# Patient Record
Sex: MALE | Race: WHITE | NOT HISPANIC OR LATINO | Employment: FULL TIME | URBAN - METROPOLITAN AREA
[De-identification: names, ages, dates, MRNs, and addresses within clinical notes are randomized per-mention and may not be internally consistent; named-entity substitution may affect disease eponyms.]

---

## 2017-01-03 ENCOUNTER — OFFICE VISIT (OUTPATIENT)
Dept: MEDICAL GROUP | Facility: MEDICAL CENTER | Age: 37
End: 2017-01-03
Attending: FAMILY MEDICINE
Payer: MEDICAID

## 2017-01-03 VITALS
DIASTOLIC BLOOD PRESSURE: 84 MMHG | OXYGEN SATURATION: 97 % | WEIGHT: 315 LBS | SYSTOLIC BLOOD PRESSURE: 136 MMHG | HEIGHT: 74 IN | RESPIRATION RATE: 20 BRPM | BODY MASS INDEX: 40.43 KG/M2 | HEART RATE: 92 BPM | TEMPERATURE: 98.3 F

## 2017-01-03 DIAGNOSIS — M54.32 LEFT SIDED SCIATICA: ICD-10-CM

## 2017-01-03 DIAGNOSIS — L03.116 CELLULITIS OF LEFT LOWER EXTREMITY: ICD-10-CM

## 2017-01-03 DIAGNOSIS — E66.01 MORBID OBESITY DUE TO EXCESS CALORIES (HCC): ICD-10-CM

## 2017-01-03 PROBLEM — L03.119 CELLULITIS OF EXTREMITY: Status: ACTIVE | Noted: 2017-01-03

## 2017-01-03 PROCEDURE — 99214 OFFICE O/P EST MOD 30 MIN: CPT | Performed by: FAMILY MEDICINE

## 2017-01-03 PROCEDURE — 99213 OFFICE O/P EST LOW 20 MIN: CPT | Performed by: FAMILY MEDICINE

## 2017-01-03 RX ORDER — OXYCODONE AND ACETAMINOPHEN 10; 325 MG/1; MG/1
1 TABLET ORAL EVERY 6 HOURS PRN
Qty: 30 TAB | Refills: 0 | Status: SHIPPED | OUTPATIENT
Start: 2017-01-03 | End: 2017-01-19 | Stop reason: SDUPTHER

## 2017-01-03 RX ORDER — SULFAMETHOXAZOLE AND TRIMETHOPRIM 800; 160 MG/1; MG/1
1 TABLET ORAL 2 TIMES DAILY
Qty: 20 TAB | Refills: 0 | Status: SHIPPED | OUTPATIENT
Start: 2017-01-03 | End: 2017-01-19

## 2017-01-03 NOTE — ASSESSMENT & PLAN NOTE
Patient reports recent exacerbation in his left-sided sciatica when he was lifting a 40 pound water jug on 12/23. He reports persistent low back pain which has kept him bedbound for part of the past 10 days. He denies urinary or fecal incontinence. Just returned to work today worked a half day with significant rising level of low back pain as the morning progressed. He was seen at [facility] emergency room about 10 days ago and given a prescription for Valium he believes 5 mg tablets, took that last dose yesterday and is now out. Reports he is not on narcotics having finished his 30 Prescription of Percocet several days ago as well. Reports he has never had any back imaging. Reports a 2 month episode of right-sided sciatica somewhat similar in intensity in October/November 2015 which resolved on its own.

## 2017-01-03 NOTE — PROGRESS NOTES
Chief Complaint   Patient presents with   • Back Pain       HISTORY OF PRESENT ILLNESS: Patient is a 36 y.o. male established patient who presents today to follow-up on ongoing left-sided sciatica, developing cellulitis of the proximal right thigh, worsening morbid obesity        Left sided sciatica  Patient reports recent exacerbation in his left-sided sciatica when he was lifting a 40 pound water jug on 12/23. He reports persistent low back pain which has kept him bedbound for part of the past 10 days. He denies urinary or fecal incontinence. Just returned to work today worked a half day with significant rising level of low back pain as the morning progressed. He was seen at [facility] emergency room about 10 days ago and given a prescription for Valium he believes 5 mg tablets, took that last dose yesterday and is now out. Reports he is not on narcotics having finished his 30 Prescription of Percocet several days ago as well. Reports he has never had any back imaging. Reports a 2 month episode of right-sided sciatica somewhat similar in intensity in October/November 2015 which resolved on its own.    Cellulitis of extremity  Patient reports onset 3-4 days ago of swelling and tenderness in the proximal medial right thigh. He reports queasiness last siting getting just some thin watery drainage, there is been no rupture or drainage of purulent material. Patient has not noticed any red streaks or fever. Does have a history of previous boils in this area.    Morbid obesity  Current weight has risen 15 pounds in the past 13 days. Patient reports that due to back pain and not attending work he decided around the house and consumed comfort food. He reports he has restarted his previous calorie restricted intake today and is shooting for 1000 1500 donta per day. He reports no unexplained hair loss or cold intolerance      Patient Active Problem List    Diagnosis Date Noted   • Left sided sciatica 01/03/2017   • Cellulitis  of extremity 01/03/2017   • Otalgia of both ears 08/30/2016   • Sleep apnea in adult 06/06/2016   • Anxiety 01/26/2016   • Morbid obesity (HCC) 09/22/2015   • ALCOHOL ABUSE, EPISODIC 08/27/2015   • Tobacco abuse counseling 08/27/2015   • GERD (gastroesophageal reflux disease) 04/06/2015   • Hypertriglyceridemia 01/05/2015   • Diabetes mellitus type 2 in obese (HCC) 12/22/2014   • HTN (hypertension) 12/10/2014   • Chest pain 12/10/2014       Allergies:Review of patient's allergies indicates no known allergies.    Current Outpatient Prescriptions   Medication Sig Dispense Refill   • sulfamethoxazole-trimethoprim (BACTRIM DS) 800-160 MG tablet Take 1 Tab by mouth 2 times a day. 20 Tab 0   • oxycodone-acetaminophen (PERCOCET-10)  MG Tab Take 1 Tab by mouth every 6 hours as needed for Severe Pain for up to 28 days. 30 Tab 0   • ranitidine (ZANTAC) 300 MG tablet TAKE 1 TAB BY MOUTH EVERY DAY. 30 Tab 6   • cyclobenzaprine (FLEXERIL) 10 MG Tab Take 1 Tab by mouth 3 times a day as needed. 90 Tab 1   • predniSONE (DELTASONE) 10 MG Tab 4 by mouth daily ×3 days, 3 by mouth daily ×3 days, 2 by mouth daily ×3 days, 1 by mouth daily ×3 days and stop 30 Tab 0   • MethylPREDNISolone (MEDROL DOSEPAK) 4 MG Tablet Therapy Pack Use as directed 1 Kit 0   • metformin (GLUCOPHAGE) 500 MG Tab TAKE 2 TABS BY MOUTH 2 TIMES A DAY, WITH MEALS. 120 Tab 3   • buPROPion SR (WELLBUTRIN-SR) 100 MG TABLET SR 12 HR Take 1 Tab by mouth 2 times a day. 60 Tab 2   • lisinopril (PRINIVIL) 10 MG Tab Take 1 Tab by mouth every day. 30 Tab 11   • TRUETEST TEST strip USE TO TEST 2 TIMES A  Strip 6   • metformin (GLUCOPHAGE) 500 MG Tab Take 2 Tabs by mouth 2 times a day, with meals. 120 Tab 6   • metoprolol SR (TOPROL XL) 50 MG TABLET SR 24 HR Take 1 Tab by mouth every day. 30 Tab 11   • sertraline (ZOLOFT) 100 MG Tab Take 1 Tab by mouth every day. 30 Tab 11   • Blood Glucose Monitoring Suppl SUPPLIES Misc Test strips order: Test strips for True  "Test meter. Sig: use 2x and prn  Diagnosis: Type 2 diabetes,, obese, uncontrolled 100 Each 3   • aspirin EC (ECOTRIN) 81 MG TBEC Take 81 mg by mouth every day.       No current facility-administered medications for this visit.     Social history-, working  Social History   Substance Use Topics   • Smoking status: Current Every Day Smoker -- 1.50 packs/day for 21 years     Types: Cigarettes   • Smokeless tobacco: Never Used   • Alcohol Use: 0.0 oz/week     0 Standard drinks or equivalent per week      Comment: 3 to 4 times weekly       Family History   Problem Relation Age of Onset   • Diabetes Mother    • Stroke Father    • Alcohol/Drug Maternal Grandfather    • Diabetes Paternal Grandfather    • Cancer Paternal Grandfather      lung   • Heart Disease Neg Hx        ROS:  Review of Systems   Constitutional: Negative for fever, chills, weight loss and malaise/fatigue.   Eyes: Negative for blurred vision.   Respiratory: Negative for cough, sputum production, shortness of breath and wheezing.    Cardiovascular: Negative for chest pain, palpitations, orthopnea and leg swelling.   Gastrointestinal: Negative for heartburn, nausea, vomiting and abdominal pain.   Endo/Heme/Allergies: Does not bruise/bleed easily.               Exam:  Blood pressure 136/84, pulse 92, temperature 36.8 °C (98.3 °F), resp. rate 20, height 1.88 m (6' 2.02\"), weight 166.017 kg (366 lb), SpO2 97 %.  General:  Well nourished, well developed male in NAD  Head is grossly normal.  Neck: Supple without JVD or bruit. Thyroid is not enlarged. Trachea is midline.  Pulmonary: Clear to ausculation .  Normal effort. No rales, ronchi, or wheezing.  Cardiovascular: Regular rate and rhythm without murmur.  Abdomen-Abdomen is soft, normal bowel sounds, no masses, guarding, ororganomegaly, or tenderness.  Lower extremities- neg for pretibial edema. 3 cm of mild swelling noted over the very proximal medial portion of the right thigh with no clear-cut " induration or fluctuance. Mildly tender to palpation      Please note that this dictation was created using voice recognition software. I have made every reasonable attempt to correct obvious errors, but I expect that there are errors of grammar and possibly content that I did not discover before finalizing the note.    Assessment/Plan:  1. Left sided sciatica  DX-LUMBAR SPINE-4+ VIEWS    DX-HIP-COMPLETE - UNILATERAL 2+ LEFT    DX-LUMBAR SPINE-4+ VIEWS   2. Cellulitis of left lower extremity     3. Morbid obesity due to excess calories (HCC)        Plan: 1. Warm compresses 3-4 times daily to the proximal right thigh  2. Rx Bactrim DS 1 by mouth twice a day ×20 days  3. X-rays of the lumbar spine and both hips  4. Rx Percocet 10/325, #20-patient has been treated with prednisone and recently Valium, and prior to that Flexeril for this round of sciatica pain  5. Revisit in 2 weeks  6. Patient is encouraged to limit calorie intake to approximately 1500 donta per day to restart weight loss

## 2017-01-03 NOTE — ASSESSMENT & PLAN NOTE
Current weight has risen 15 pounds in the past 13 days. Patient reports that due to back pain and not attending work he decided around the house and consumed comfort food. He reports he has restarted his previous calorie restricted intake today and is shooting for 1000 1500 donta per day. He reports no unexplained hair loss or cold intolerance

## 2017-01-03 NOTE — MR AVS SNAPSHOT
"Luis Alfredo Felixaubrie Tegan   1/3/2017 1:10 PM   Office Visit   MRN: [id number]    [facility]   Dept Phone:  [phone]    Description:  Male : 1980   Provider:  Nicho Tovar M.D.           Reason for Visit     Back Pain           Allergies as of 1/3/2017     No Known Allergies      You were diagnosed with     Left sided sciatica   [158073]       Cellulitis of left lower extremity   [966578]       Morbid obesity due to excess calories (HCC)   [7814986]         Vital Signs     Blood Pressure Pulse Temperature Respirations Height Weight    136/84 mmHg 92 36.8 °C (98.3 °F) 20 1.88 m (6' 2.02\") 166.017 kg (366 lb)    Body Mass Index Oxygen Saturation Smoking Status             46.97 kg/m2 97% Current Every Day Smoker         Basic Information     Date Of Birth Sex Race Ethnicity Preferred Language    1980 Male White Non- English      Your appointments     2017  4:30 PM   Established Patient with Nicho Tovar M.D.   [facility] ([facility])    [address] [place] [id number]   [phone]           You will be receiving a confirmation call a few days before your appointment from our automated call confirmation system.              Problem List              ICD-10-CM Priority Class Noted - Resolved    HTN (hypertension) I10   12/10/2014 - Present    Chest pain R07.9   12/10/2014 - Present    Diabetes mellitus type 2 in obese (HCC) E11.9, E66.9   2014 - Present    Hypertriglyceridemia E78.1   2015 - Present    GERD (gastroesophageal reflux disease) K21.9   2015 - Present    ALCOHOL ABUSE, EPISODIC    2015 - Present    Tobacco abuse counseling Z71.6   2015 - Present    Morbid obesity (HCC) E66.01   2015 - Present    Anxiety F41.9   2016 - Present    Sleep apnea in adult G47.33   2016 - Present    Otalgia of both ears H92.03   2016 - Present      Health Maintenance        Date Due Completion Dates    IMM HEP " B VACCINE (1 of 3 - Primary Series) 1980 ---    IMM DTaP/Tdap/Td Vaccine (1 - Tdap) 5/6/1997 ---    IMM PNEUMOCOCCAL 19-64 (ADULT) MEDIUM RISK SERIES (1 of 1 - PPSV23) 5/6/1997 ---    IMM INFLUENZA (1) 9/1/2016 ---    DIABETES MONOFILAMTENT / LE EXAM 12/10/2016 12/10/2015 (Done)    Override on 12/10/2015: Done    SERUM CREATININE 1/5/2017 1/5/2016, 8/22/2015, 3/2/2015, 12/10/2014, 5/26/2009    A1C SCREENING 2/16/2017 8/16/2016, 1/5/2016, 8/22/2015, 5/6/2015, 12/31/2014    URINE ACR / MICROALBUMIN 3/9/2017 3/9/2016, 5/6/2015, 12/31/2014    FASTING LIPID PROFILE 8/16/2017 8/16/2016, 5/6/2015, 12/31/2014    RETINAL SCREENING 10/31/2017 10/31/2016            Current Immunizations     No immunizations on file.      Below and/or attached are the medications your provider expects you to take. Review all of your home medications and newly ordered medications with your provider and/or pharmacist. Follow medication instructions as directed by your provider and/or pharmacist. Please keep your medication list with you and share with your provider. Update the information when medications are discontinued, doses are changed, or new medications (including over-the-counter products) are added; and carry medication information at all times in the event of emergency situations     Allergies:  No Known Allergies          Medications  Valid as of: January 03, 2017 -  1:34 PM    Generic Name Brand Name Tablet Size Instructions for use    Aspirin (Tablet Delayed Response) ECOTRIN 81 MG Take 81 mg by mouth every day.        Blood Glucose Monitoring Suppl (Misc) Blood Glucose Monitoring Suppl SUPPLIES Test strips order: Test strips for True Test meter. Sig: use 2x and prn  Diagnosis: Type 2 diabetes,, obese, uncontrolled        BuPROPion HCl (TABLET SR 12 HR) WELLBUTRIN- MG Take 1 Tab by mouth 2 times a day.        Cyclobenzaprine HCl (Tab) FLEXERIL 10 MG Take 1 Tab by mouth 3 times a day as needed.        Glucose Blood (Strip)  TRUETEST TEST  USE TO TEST 2 TIMES A DAY        Lisinopril (Tab) PRINIVIL 10 MG Take 1 Tab by mouth every day.        MetFORMIN HCl (Tab) GLUCOPHAGE 500 MG Take 2 Tabs by mouth 2 times a day, with meals.        MetFORMIN HCl (Tab) GLUCOPHAGE 500 MG TAKE 2 TABS BY MOUTH 2 TIMES A DAY, WITH MEALS.        MethylPREDNISolone (Tablet Therapy Pack) MEDROL DOSEPAK 4 MG Use as directed        Metoprolol Succinate (TABLET SR 24 HR) TOPROL XL 50 MG Take 1 Tab by mouth every day.        Oxycodone-Acetaminophen (Tab) PERCOCET-10  MG Take 1 Tab by mouth every 6 hours as needed for Severe Pain for up to 28 days.        PredniSONE (Tab) DELTASONE 10 MG 4 by mouth daily ×3 days, 3 by mouth daily ×3 days, 2 by mouth daily ×3 days, 1 by mouth daily ×3 days and stop        RaNITidine HCl (Tab) ZANTAC 300 MG TAKE 1 TAB BY MOUTH EVERY DAY.        Sertraline HCl (Tab) ZOLOFT 100 MG Take 1 Tab by mouth every day.        Sulfamethoxazole-Trimethoprim (Tab) BACTRIM -160 MG Take 1 Tab by mouth 2 times a day.        .                 Medicines prescribed today were sent to:     [id number] - [place], [place] - [address]    [address] [address]    Phone: [phone] Fax: [phone]    Open 24 Hours?: No      Medication refill instructions:       If your prescription bottle indicates you have medication refills left, it is not necessary to call your provider’s office. Please contact your pharmacy and they will refill your medication.    If your prescription bottle indicates you do not have any refills left, you may request refills at any time through one of the following ways: The online [employer] system (except Urgent Care), by calling your provider’s office, or by asking your pharmacy to contact your provider’s office with a refill request. Medication refills are processed only during regular business hours and may not be available until the next business day. Your provider may request  additional information or to have a follow-up visit with you prior to refilling your medication.   *Please Note: Medication refills are assigned a new Rx number when refilled electronically. Your pharmacy may indicate that no refills were authorized even though a new prescription for the same medication is available at the pharmacy. Please request the medicine by name with the pharmacy before contacting your provider for a refill.        Your To Do List     Future Labs/Procedures Complete By Expires    DX-HIP-COMPLETE - UNILATERAL 2+ LEFT  As directed 1/3/2018    DX-LUMBAR SPINE-4+ VIEWS  As directed 1/3/2018      Other Notes About Your Plan     Fall risk done 5/28/15           MyChart Status: Patient Declined

## 2017-01-03 NOTE — ASSESSMENT & PLAN NOTE
Patient reports onset 3-4 days ago of swelling and tenderness in the proximal medial right thigh. He reports queasiness last siting getting just some thin watery drainage, there is been no rupture or drainage of purulent material. Patient has not noticed any red streaks or fever. Does have a history of previous boils in this area.

## 2017-01-16 ENCOUNTER — HOSPITAL ENCOUNTER (EMERGENCY)
Facility: MEDICAL CENTER | Age: 37
End: 2017-01-16
Attending: EMERGENCY MEDICINE
Payer: MEDICAID

## 2017-01-16 ENCOUNTER — APPOINTMENT (OUTPATIENT)
Dept: RADIOLOGY | Facility: MEDICAL CENTER | Age: 37
End: 2017-01-16
Attending: EMERGENCY MEDICINE
Payer: MEDICAID

## 2017-01-16 VITALS
WEIGHT: 315 LBS | HEART RATE: 94 BPM | DIASTOLIC BLOOD PRESSURE: 91 MMHG | OXYGEN SATURATION: 89 % | TEMPERATURE: 96.5 F | BODY MASS INDEX: 46.17 KG/M2 | RESPIRATION RATE: 20 BRPM | SYSTOLIC BLOOD PRESSURE: 150 MMHG

## 2017-01-16 DIAGNOSIS — M54.9 PAIN, UPPER BACK: ICD-10-CM

## 2017-01-16 LAB
ALBUMIN SERPL BCP-MCNC: 4.5 G/DL (ref 3.2–4.9)
ALBUMIN/GLOB SERPL: 1.5 G/DL
ALP SERPL-CCNC: 101 U/L (ref 30–99)
ALT SERPL-CCNC: 49 U/L (ref 2–50)
AMORPH CRY #/AREA URNS HPF: PRESENT /HPF
ANION GAP SERPL CALC-SCNC: 8 MMOL/L (ref 0–11.9)
APPEARANCE UR: CLEAR
AST SERPL-CCNC: 22 U/L (ref 12–45)
BASOPHILS # BLD AUTO: 0.6 % (ref 0–1.8)
BASOPHILS # BLD: 0.05 K/UL (ref 0–0.12)
BILIRUB SERPL-MCNC: 0.5 MG/DL (ref 0.1–1.5)
BILIRUB UR QL STRIP.AUTO: NEGATIVE
BNP SERPL-MCNC: 42 PG/ML (ref 0–100)
BUN SERPL-MCNC: 16 MG/DL (ref 8–22)
CALCIUM SERPL-MCNC: 9.8 MG/DL (ref 8.5–10.5)
CHLORIDE SERPL-SCNC: 102 MMOL/L (ref 96–112)
CO2 SERPL-SCNC: 23 MMOL/L (ref 20–33)
COLOR UR: YELLOW
CREAT SERPL-MCNC: 0.77 MG/DL (ref 0.5–1.4)
EOSINOPHIL # BLD AUTO: 0.01 K/UL (ref 0–0.51)
EOSINOPHIL NFR BLD: 0.1 % (ref 0–6.9)
EPI CELLS #/AREA URNS HPF: ABNORMAL /HPF
ERYTHROCYTE [DISTWIDTH] IN BLOOD BY AUTOMATED COUNT: 48.8 FL (ref 35.9–50)
GFR SERPL CREATININE-BSD FRML MDRD: >60 ML/MIN/1.73 M 2
GLOBULIN SER CALC-MCNC: 3.1 G/DL (ref 1.9–3.5)
GLUCOSE SERPL-MCNC: 148 MG/DL (ref 65–99)
GLUCOSE UR STRIP.AUTO-MCNC: NEGATIVE MG/DL
HCT VFR BLD AUTO: 45.5 % (ref 42–52)
HGB BLD-MCNC: 15.9 G/DL (ref 14–18)
HYALINE CASTS #/AREA URNS LPF: ABNORMAL /LPF
IMM GRANULOCYTES # BLD AUTO: 0.05 K/UL (ref 0–0.11)
IMM GRANULOCYTES NFR BLD AUTO: 0.6 % (ref 0–0.9)
KETONES UR STRIP.AUTO-MCNC: NEGATIVE MG/DL
LEUKOCYTE ESTERASE UR QL STRIP.AUTO: NEGATIVE
LYMPHOCYTES # BLD AUTO: 2.43 K/UL (ref 1–4.8)
LYMPHOCYTES NFR BLD: 30.3 % (ref 22–41)
MCH RBC QN AUTO: 33.6 PG (ref 27–33)
MCHC RBC AUTO-ENTMCNC: 34.9 G/DL (ref 33.7–35.3)
MCV RBC AUTO: 96.2 FL (ref 81.4–97.8)
MICRO URNS: ABNORMAL
MONOCYTES # BLD AUTO: 0.61 K/UL (ref 0–0.85)
MONOCYTES NFR BLD AUTO: 7.6 % (ref 0–13.4)
MUCOUS THREADS #/AREA URNS HPF: ABNORMAL /HPF
NEUTROPHILS # BLD AUTO: 4.87 K/UL (ref 1.82–7.42)
NEUTROPHILS NFR BLD: 60.8 % (ref 44–72)
NITRITE UR QL STRIP.AUTO: NEGATIVE
NRBC # BLD AUTO: 0 K/UL
NRBC BLD AUTO-RTO: 0 /100 WBC
PH UR STRIP.AUTO: 5.5 [PH]
PLATELET # BLD AUTO: 197 K/UL (ref 164–446)
PMV BLD AUTO: 10.1 FL (ref 9–12.9)
POTASSIUM SERPL-SCNC: 4.4 MMOL/L (ref 3.6–5.5)
PROT SERPL-MCNC: 7.6 G/DL (ref 6–8.2)
PROT UR QL STRIP: 30 MG/DL
RBC # BLD AUTO: 4.73 M/UL (ref 4.7–6.1)
RBC # URNS HPF: ABNORMAL /HPF
RBC UR QL AUTO: NEGATIVE
SODIUM SERPL-SCNC: 133 MMOL/L (ref 135–145)
SP GR UR STRIP.AUTO: 1.02
TROPONIN I SERPL-MCNC: 0.04 NG/ML (ref 0–0.04)
WBC # BLD AUTO: 8 K/UL (ref 4.8–10.8)
WBC #/AREA URNS HPF: ABNORMAL /HPF

## 2017-01-16 PROCEDURE — 99285 EMERGENCY DEPT VISIT HI MDM: CPT

## 2017-01-16 PROCEDURE — 93005 ELECTROCARDIOGRAM TRACING: CPT | Performed by: EMERGENCY MEDICINE

## 2017-01-16 PROCEDURE — 85025 COMPLETE CBC W/AUTO DIFF WBC: CPT

## 2017-01-16 PROCEDURE — 700111 HCHG RX REV CODE 636 W/ 250 OVERRIDE (IP): Performed by: EMERGENCY MEDICINE

## 2017-01-16 PROCEDURE — 36415 COLL VENOUS BLD VENIPUNCTURE: CPT

## 2017-01-16 PROCEDURE — 96375 TX/PRO/DX INJ NEW DRUG ADDON: CPT

## 2017-01-16 PROCEDURE — 81001 URINALYSIS AUTO W/SCOPE: CPT

## 2017-01-16 PROCEDURE — 83880 ASSAY OF NATRIURETIC PEPTIDE: CPT

## 2017-01-16 PROCEDURE — 96361 HYDRATE IV INFUSION ADD-ON: CPT

## 2017-01-16 PROCEDURE — 700105 HCHG RX REV CODE 258: Performed by: EMERGENCY MEDICINE

## 2017-01-16 PROCEDURE — 84484 ASSAY OF TROPONIN QUANT: CPT

## 2017-01-16 PROCEDURE — 71275 CT ANGIOGRAPHY CHEST: CPT

## 2017-01-16 PROCEDURE — 80053 COMPREHEN METABOLIC PANEL: CPT

## 2017-01-16 PROCEDURE — 700117 HCHG RX CONTRAST REV CODE 255: Performed by: EMERGENCY MEDICINE

## 2017-01-16 PROCEDURE — 96374 THER/PROPH/DIAG INJ IV PUSH: CPT

## 2017-01-16 PROCEDURE — 74177 CT ABD & PELVIS W/CONTRAST: CPT

## 2017-01-16 RX ORDER — SODIUM CHLORIDE 9 MG/ML
INJECTION, SOLUTION INTRAVENOUS CONTINUOUS
Status: DISCONTINUED | OUTPATIENT
Start: 2017-01-16 | End: 2017-01-16 | Stop reason: HOSPADM

## 2017-01-16 RX ORDER — MORPHINE SULFATE 4 MG/ML
4 INJECTION, SOLUTION INTRAMUSCULAR; INTRAVENOUS ONCE
Status: COMPLETED | OUTPATIENT
Start: 2017-01-16 | End: 2017-01-16

## 2017-01-16 RX ORDER — ONDANSETRON 2 MG/ML
4 INJECTION INTRAMUSCULAR; INTRAVENOUS ONCE
Status: COMPLETED | OUTPATIENT
Start: 2017-01-16 | End: 2017-01-16

## 2017-01-16 RX ORDER — DIAZEPAM 5 MG/1
10 TABLET ORAL EVERY 6 HOURS PRN
Qty: 15 TAB | Refills: 0 | Status: SHIPPED | OUTPATIENT
Start: 2017-01-16 | End: 2017-03-03

## 2017-01-16 RX ORDER — CAPSAICIN 0.75 MG/G
1 CREAM TOPICAL 3 TIMES DAILY
Qty: 1 TUBE | Refills: 1 | Status: SHIPPED | OUTPATIENT
Start: 2017-01-16 | End: 2017-03-28

## 2017-01-16 RX ADMIN — IOHEXOL 100 ML: 350 INJECTION, SOLUTION INTRAVENOUS at 11:00

## 2017-01-16 RX ADMIN — ONDANSETRON 4 MG: 2 INJECTION, SOLUTION INTRAMUSCULAR; INTRAVENOUS at 09:49

## 2017-01-16 RX ADMIN — SODIUM CHLORIDE: 9 INJECTION, SOLUTION INTRAVENOUS at 09:49

## 2017-01-16 RX ADMIN — MORPHINE SULFATE 4 MG: 4 INJECTION INTRAVENOUS at 09:49

## 2017-01-16 NOTE — DISCHARGE INSTRUCTIONS
Back Injury Prevention  Back injuries can be very painful. They can also be difficult to heal. After having one back injury, you are more likely to injure your back again. It is important to learn how to avoid injuring or re-injuring your back. The following tips can help you to prevent a back injury.  WHAT SHOULD I KNOW ABOUT PHYSICAL FITNESS?  · Exercise for 30 minutes per day on most days of the week or as directed by your health care provider. Make sure to:  · Do aerobic exercises, such as walking, jogging, biking, or swimming.  · Do exercises that increase balance and strength, such as frank chi and yoga. These can decrease your risk of falling and injuring your back.  · Do stretching exercises to help with flexibility.  · Try to develop strong abdominal muscles. Your abdominal muscles provide a lot of the support that is needed by your back.  · Maintain a healthy weight.  This helps to decrease your risk of a back injury.  WHAT SHOULD I KNOW ABOUT MY DIET?  · Talk with your health care provider about your overall diet. Take supplements and vitamins only as directed by your health care provider.  · Talk with your health care provider about how much calcium and vitamin D you need each day. These nutrients help to prevent weakening of the bones (osteoporosis). Osteoporosis can cause broken (fractured) bones, which lead to back pain.  · Include good sources of calcium in your diet, such as dairy products, green leafy vegetables, and products that have had calcium added to them (fortified).  · Include good sources of vitamin D in your diet, such as milk and foods that are fortified with vitamin D.  WHAT SHOULD I KNOW ABOUT MY POSTURE?  · Sit up straight and stand up straight. Avoid leaning forward when you sit or hunching over when you stand.  · Choose chairs that have good low-back (lumbar) support.  · If you work at a desk, sit close to it so you do not need to lean over. Keep your chin tucked in. Keep your neck  "drawn back, and keep your elbows bent at a right angle. Your arms should look like the letter \"L.\"  · Sit high and close to the steering wheel when you drive. Add a lumbar support to your car seat, if needed.  · Avoid sitting or standing in one position for very long. Take breaks to get up, stretch, and walk around at least one time every hour. Take breaks every hour if you are driving for long periods of time.  · Sleep on your side with your knees slightly bent, or sleep on your back with a pillow under your knees. Do not lie on the front of your body to sleep.  WHAT SHOULD I KNOW ABOUT LIFTING, TWISTING, AND REACHING?  Lifting and Heavy Lifting  · Avoid heavy lifting, especially repetitive heavy lifting. If you must do heavy lifting:  · Stretch before lifting.  · Work slowly.  · Rest between lifts.  · Use a tool such as a cart or a pedro to move objects if one is available.  · Make several small trips instead of carrying one heavy load.  · Ask for help when you need it, especially when moving big objects.  · Follow these steps when lifting:  · Stand with your feet shoulder-width apart.  · Get as close to the object as you can. Do not try to  a heavy object that is far from your body.  · Use handles or lifting straps if they are available.  · Bend at your knees. Squat down, but keep your heels off the floor.  · Keep your shoulders pulled back, your chin tucked in, and your back straight.  · Lift the object slowly while you tighten the muscles in your legs, abdomen, and buttocks. Keep the object as close to the center of your body as possible.  · Follow these steps when putting down a heavy load:  · Stand with your feet shoulder-width apart.  · Lower the object slowly while you tighten the muscles in your legs, abdomen, and buttocks. Keep the object as close to the center of your body as possible.  · Keep your shoulders pulled back, your chin tucked in, and your back straight.  · Bend at your knees. Squat " down, but keep your heels off the floor.  · Use handles or lifting straps if they are available.  Twisting and Reaching  · Avoid lifting heavy objects above your waist.  · Do not twist at your waist while you are lifting or carrying a load. If you need to turn, move your feet.  · Do not bend over without bending at your knees.  · Avoid reaching over your head, across a table, or for an object on a high surface.  WHAT ARE SOME OTHER TIPS?  · Avoid wet floors and icy ground. Keep sidewalks clear of ice to prevent falls.  · Do not sleep on a mattress that is too soft or too hard.  · Keep items that are used frequently within easy reach.  · Put heavier objects on shelves at waist level, and put lighter objects on lower or higher shelves.  · Find ways to decrease your stress, such as exercise, massage, or relaxation techniques. Stress can build up in your muscles. Tense muscles are more vulnerable to injury.  · Talk with your health care provider if you feel anxious or depressed. These conditions can make back pain worse.  · Wear flat heel shoes with cushioned soles.  · Avoid sudden movements.  · Use both shoulder straps when carrying a backpack.  · Do not use any tobacco products, including cigarettes, chewing tobacco, or electronic cigarettes. If you need help quitting, ask your health care provider.     This information is not intended to replace advice given to you by your health care provider. Make sure you discuss any questions you have with your health care provider.     Document Released: 01/25/2006 Document Revised: 05/03/2016 Document Reviewed: 12/22/2015  [employer] Interactive Patient Education ©2016 [employer]    Back Pain, Adult  Back pain is very common in adults. The cause of back pain is rarely dangerous and the pain often gets better over time. The cause of your back pain may not be known. Some common causes of back pain include:  · Strain of the muscles or ligaments supporting the spine.  · Wear and  tear (degeneration) of the spinal disks.  · Arthritis.  · Direct injury to the back.  For many people, back pain may return. Since back pain is rarely dangerous, most people can learn to manage this condition on their own.  HOME CARE INSTRUCTIONS  Watch your back pain for any changes. The following actions may help to lessen any discomfort you are feeling:  · Remain active. It is stressful on your back to sit or  one place for long periods of time. Do not sit, drive, or  one place for more than 30 minutes at a time. Take short walks on even surfaces as soon as you are able. Try to increase the length of time you walk each day.  · Exercise regularly as directed by your health care provider. Exercise helps your back heal faster. It also helps avoid future injury by keeping your muscles strong and flexible.  · Do not stay in bed. Resting more than 1-2 days can delay your recovery.  · Pay attention to your body when you bend and lift. The most comfortable positions are those that put less stress on your recovering back. Always use proper lifting techniques, including:  ¨ Bending your knees.  ¨ Keeping the load close to your body.  ¨ Avoiding twisting.  · Find a comfortable position to sleep. Use a firm mattress and lie on your side with your knees slightly bent. If you lie on your back, put a pillow under your knees.  · Avoid feeling anxious or stressed. Stress increases muscle tension and can worsen back pain. It is important to recognize when you are anxious or stressed and learn ways to manage it, such as with exercise.  · Take medicines only as directed by your health care provider. Over-the-counter medicines to reduce pain and inflammation are often the most helpful. Your health care provider may prescribe muscle relaxant drugs. These medicines help dull your pain so you can more quickly return to your normal activities and healthy exercise.  · Apply ice to the injured area:  ¨ Put ice in a plastic  bag.  ¨ Place a towel between your skin and the bag.  ¨ Leave the ice on for 20 minutes, 2-3 times a day for the first 2-3 days. After that, ice and heat may be alternated to reduce pain and spasms.  · Maintain a healthy weight. Excess weight puts extra stress on your back and makes it difficult to maintain good posture.  SEEK MEDICAL CARE IF:  · You have pain that is not relieved with rest or medicine.  · You have increasing pain going down into the legs or buttocks.  · You have pain that does not improve in one week.  · You have night pain.  · You lose weight.  · You have a fever or chills.  SEEK IMMEDIATE MEDICAL CARE IF:   · You develop new bowel or bladder control problems.  · You have unusual weakness or numbness in your arms or legs.  · You develop nausea or vomiting.  · You develop abdominal pain.  · You feel faint.     This information is not intended to replace advice given to you by your health care provider. Make sure you discuss any questions you have with your health care provider.     Document Released: 12/18/2006 Document Revised: 01/08/2016 Document Reviewed: 04/21/2015  [employer] Interactive Patient Education ©2016 [employer]

## 2017-01-16 NOTE — ED AVS SNAPSHOT
1/16/2017          Luis Alfredo Javed  [address] [place] [address]    Dear Luis Alfredo:    [facility] wants to ensure your discharge home is safe and you or your loved ones have had all your questions answered regarding your care after you leave the hospital.    You may receive a telephone call within two days of your discharge.  This call is to make certain you understand your discharge instructions as well as ensure we provided you with the best care possible during your stay with us.     The call will only last approximately 3-5 minutes and will be done by a nurse.    Once again, we want to ensure your discharge home is safe and that you have a clear understanding of any next steps in your care.  If you have any questions or concerns, please do not hesitate to contact us, we are here for you.  Thank you for choosing [facility] for your healthcare needs.    Sincerely,    Rao Lafleur    [facility]

## 2017-01-16 NOTE — ED NOTES
Pt given discharge instructions and Rx x 2. Pt verbalized understanding. VSS no acute distress noted, pt able to ambulate without difficulty. Pt home with wife.

## 2017-01-16 NOTE — ED NOTES
Pt c/o mid back pain, reports r/t his sciatica, pt reports c/o RUQ pain for 1.5 weeks. Pt denies NVD. Pt in NAD. Pt denies any cp or sob.

## 2017-01-16 NOTE — ED NOTES
piv established, blood and urine collected and sent. Pt medicated per mar. ekg done and shown to erp. Pt and family updated on poc.

## 2017-01-16 NOTE — ED AVS SNAPSHOT
After Visit Summary                                                                                                                Luis Alfredo Javed   MRN: [id number]    Department:  [facility], Emergency Dept   Date of Visit:  1/16/2017            [facility], Emergency Dept    [address] [facility] [address]    Phone:  [phone]      You were seen by     Dayday Holder M.D.      Your Diagnosis Was     Pain, upper back     M54.9       These are the medications you received during your hospitalization from 01/16/2017 0758 to 01/16/2017 1207     Date/Time Order Dose Route Action    01/16/2017 0949 NS infusion   Intravenous New Bag    01/16/2017 0949 morphine (pf) 4 mg/ml injection 4 mg 4 mg Intravenous Given    01/16/2017 0949 ondansetron (ZOFRAN) syringe/vial injection 4 mg 4 mg Intravenous Given    01/16/2017 1100 iohexol (OMNIPAQUE) 350 mg/mL 100 mL Intravenous Given      Follow-up Information     1. Follow up with Nicho Tovar M.D. In 2 days.    Specialty:  Family Medicine    Contact information    [address] [address] [phone]        Medication Information     Review all of your home medications and newly ordered medications with your primary doctor and/or pharmacist as soon as possible. Follow medication instructions as directed by your doctor and/or pharmacist.     Please keep your complete medication list with you and share with your physician. Update the information when medications are discontinued, doses are changed, or new medications (including over-the-counter products) are added; and carry medication information at all times in the event of emergency situations.               Medication List      START taking these medications        Instructions    capsicum 0.075 % topical cream   Commonly known as:  ZOSTRIX    Apply 1 Application to affected area(s) 3 times a day.   Dose:  1 Application       diazepam 5 MG Tabs   Commonly  known as:  VALIUM    Take 2 Tabs by mouth every 6 hours as needed (vertigo). No driving, no drinking alcohol   Dose:  10 mg         ASK your doctor about these medications        Instructions    aspirin EC 81 MG Tbec   Commonly known as:  ECOTRIN    Take 81 mg by mouth every day.   Dose:  81 mg       Blood Glucose Monitoring Suppl SUPPLIES Misc    Test strips order: Test strips for True Test meter. Sig: use 2x and prn Diagnosis: Type 2 diabetes,, obese, uncontrolled       buPROPion  MG Tb12   Commonly known as:  WELLBUTRIN-SR    Take 1 Tab by mouth 2 times a day.   Dose:  100 mg       cyclobenzaprine 10 MG Tabs   Commonly known as:  FLEXERIL    Take 1 Tab by mouth 3 times a day as needed.   Dose:  10 mg       lisinopril 10 MG Tabs   Commonly known as:  PRINIVIL    Take 1 Tab by mouth every day.   Dose:  10 mg       * metformin 500 MG Tabs   Commonly known as:  GLUCOPHAGE    Take 2 Tabs by mouth 2 times a day, with meals.   Dose:  1000 mg       * metformin 500 MG Tabs   Commonly known as:  GLUCOPHAGE    TAKE 2 TABS BY MOUTH 2 TIMES A DAY, WITH MEALS.       MethylPREDNISolone 4 MG Tbpk   Commonly known as:  MEDROL DOSEPAK    Use as directed       metoprolol SR 50 MG Tb24   Commonly known as:  TOPROL XL    Take 1 Tab by mouth every day.   Dose:  50 mg       oxycodone-acetaminophen  MG Tabs   Commonly known as:  PERCOCET-10    Take 1 Tab by mouth every 6 hours as needed for Severe Pain for up to 28 days.   Dose:  1 Tab       predniSONE 10 MG Tabs   Commonly known as:  DELTASONE    4 by mouth daily ?3 days, 3 by mouth daily ?3 days, 2 by mouth daily ?3 days, 1 by mouth daily ?3 days and stop       ranitidine 300 MG tablet   Commonly known as:  ZANTAC    TAKE 1 TAB BY MOUTH EVERY DAY.       sertraline 100 MG Tabs   Commonly known as:  ZOLOFT    Take 1 Tab by mouth every day.   Dose:  100 mg       sulfamethoxazole-trimethoprim 800-160 MG tablet   Commonly known as:  BACTRIM DS    Take 1 Tab by mouth 2 times a  day.   Dose:  1 Tab       TRUETEST TEST strip   Generic drug:  glucose blood    USE TO TEST 2 TIMES A DAY       * Notice:  This list has 2 medication(s) that are the same as other medications prescribed for you. Read the directions carefully, and ask your doctor or other care provider to review them with you.            Procedures and tests performed during your visit     BTYPE NATRIURETIC PEPTIDE    CBC WITH DIFFERENTIAL    COMP METABOLIC PANEL    CONSENT FOR CONTRAST INJECTION    CT-ABDOMEN-PELVIS WITH    CT-CTA CHEST PULMONARY ARTERY W/ RECONS    Cardiac Monitoring    EKG (NOW)    ESTIMATED GFR    TROPONIN    URINALYSIS (UA)    URINE MICROSCOPIC (W/UA)        Discharge Instructions       Back Injury Prevention  Back injuries can be very painful. They can also be difficult to heal. After having one back injury, you are more likely to injure your back again. It is important to learn how to avoid injuring or re-injuring your back. The following tips can help you to prevent a back injury.  WHAT SHOULD I KNOW ABOUT PHYSICAL FITNESS?  · Exercise for 30 minutes per day on most days of the week or as directed by your health care provider. Make sure to:  · Do aerobic exercises, such as walking, jogging, biking, or swimming.  · Do exercises that increase balance and strength, such as farnk chi and yoga. These can decrease your risk of falling and injuring your back.  · Do stretching exercises to help with flexibility.  · Try to develop strong abdominal muscles. Your abdominal muscles provide a lot of the support that is needed by your back.  · Maintain a healthy weight.  This helps to decrease your risk of a back injury.  WHAT SHOULD I KNOW ABOUT MY DIET?  · Talk with your health care provider about your overall diet. Take supplements and vitamins only as directed by your health care provider.  · Talk with your health care provider about how much calcium and vitamin D you need each day. These nutrients help to prevent  "weakening of the bones (osteoporosis). Osteoporosis can cause broken (fractured) bones, which lead to back pain.  · Include good sources of calcium in your diet, such as dairy products, green leafy vegetables, and products that have had calcium added to them (fortified).  · Include good sources of vitamin D in your diet, such as milk and foods that are fortified with vitamin D.  WHAT SHOULD I KNOW ABOUT MY POSTURE?  · Sit up straight and stand up straight. Avoid leaning forward when you sit or hunching over when you stand.  · Choose chairs that have good low-back (lumbar) support.  · If you work at a desk, sit close to it so you do not need to lean over. Keep your chin tucked in. Keep your neck drawn back, and keep your elbows bent at a right angle. Your arms should look like the letter \"L.\"  · Sit high and close to the steering wheel when you drive. Add a lumbar support to your car seat, if needed.  · Avoid sitting or standing in one position for very long. Take breaks to get up, stretch, and walk around at least one time every hour. Take breaks every hour if you are driving for long periods of time.  · Sleep on your side with your knees slightly bent, or sleep on your back with a pillow under your knees. Do not lie on the front of your body to sleep.  WHAT SHOULD I KNOW ABOUT LIFTING, TWISTING, AND REACHING?  Lifting and Heavy Lifting  · Avoid heavy lifting, especially repetitive heavy lifting. If you must do heavy lifting:  · Stretch before lifting.  · Work slowly.  · Rest between lifts.  · Use a tool such as a cart or a pedro to move objects if one is available.  · Make several small trips instead of carrying one heavy load.  · Ask for help when you need it, especially when moving big objects.  · Follow these steps when lifting:  · Stand with your feet shoulder-width apart.  · Get as close to the object as you can. Do not try to  a heavy object that is far from your body.  · Use handles or lifting straps " if they are available.  · Bend at your knees. Squat down, but keep your heels off the floor.  · Keep your shoulders pulled back, your chin tucked in, and your back straight.  · Lift the object slowly while you tighten the muscles in your legs, abdomen, and buttocks. Keep the object as close to the center of your body as possible.  · Follow these steps when putting down a heavy load:  · Stand with your feet shoulder-width apart.  · Lower the object slowly while you tighten the muscles in your legs, abdomen, and buttocks. Keep the object as close to the center of your body as possible.  · Keep your shoulders pulled back, your chin tucked in, and your back straight.  · Bend at your knees. Squat down, but keep your heels off the floor.  · Use handles or lifting straps if they are available.  Twisting and Reaching  · Avoid lifting heavy objects above your waist.  · Do not twist at your waist while you are lifting or carrying a load. If you need to turn, move your feet.  · Do not bend over without bending at your knees.  · Avoid reaching over your head, across a table, or for an object on a high surface.  WHAT ARE SOME OTHER TIPS?  · Avoid wet floors and icy ground. Keep sidewalks clear of ice to prevent falls.  · Do not sleep on a mattress that is too soft or too hard.  · Keep items that are used frequently within easy reach.  · Put heavier objects on shelves at waist level, and put lighter objects on lower or higher shelves.  · Find ways to decrease your stress, such as exercise, massage, or relaxation techniques. Stress can build up in your muscles. Tense muscles are more vulnerable to injury.  · Talk with your health care provider if you feel anxious or depressed. These conditions can make back pain worse.  · Wear flat heel shoes with cushioned soles.  · Avoid sudden movements.  · Use both shoulder straps when carrying a backpack.  · Do not use any tobacco products, including cigarettes, chewing tobacco, or electronic  cigarettes. If you need help quitting, ask your health care provider.     This information is not intended to replace advice given to you by your health care provider. Make sure you discuss any questions you have with your health care provider.     Document Released: 01/25/2006 Document Revised: 05/03/2016 Document Reviewed: 12/22/2015  [employer] Interactive Patient Education ©2016 [employer]    Back Pain, Adult  Back pain is very common in adults. The cause of back pain is rarely dangerous and the pain often gets better over time. The cause of your back pain may not be known. Some common causes of back pain include:  · Strain of the muscles or ligaments supporting the spine.  · Wear and tear (degeneration) of the spinal disks.  · Arthritis.  · Direct injury to the back.  For many people, back pain may return. Since back pain is rarely dangerous, most people can learn to manage this condition on their own.  HOME CARE INSTRUCTIONS  Watch your back pain for any changes. The following actions may help to lessen any discomfort you are feeling:  · Remain active. It is stressful on your back to sit or  one place for long periods of time. Do not sit, drive, or  one place for more than 30 minutes at a time. Take short walks on even surfaces as soon as you are able. Try to increase the length of time you walk each day.  · Exercise regularly as directed by your health care provider. Exercise helps your back heal faster. It also helps avoid future injury by keeping your muscles strong and flexible.  · Do not stay in bed. Resting more than 1-2 days can delay your recovery.  · Pay attention to your body when you bend and lift. The most comfortable positions are those that put less stress on your recovering back. Always use proper lifting techniques, including:  ¨ Bending your knees.  ¨ Keeping the load close to your body.  ¨ Avoiding twisting.  · Find a comfortable position to sleep. Use a firm mattress and lie  on your side with your knees slightly bent. If you lie on your back, put a pillow under your knees.  · Avoid feeling anxious or stressed. Stress increases muscle tension and can worsen back pain. It is important to recognize when you are anxious or stressed and learn ways to manage it, such as with exercise.  · Take medicines only as directed by your health care provider. Over-the-counter medicines to reduce pain and inflammation are often the most helpful. Your health care provider may prescribe muscle relaxant drugs. These medicines help dull your pain so you can more quickly return to your normal activities and healthy exercise.  · Apply ice to the injured area:  ¨ Put ice in a plastic bag.  ¨ Place a towel between your skin and the bag.  ¨ Leave the ice on for 20 minutes, 2-3 times a day for the first 2-3 days. After that, ice and heat may be alternated to reduce pain and spasms.  · Maintain a healthy weight. Excess weight puts extra stress on your back and makes it difficult to maintain good posture.  SEEK MEDICAL CARE IF:  · You have pain that is not relieved with rest or medicine.  · You have increasing pain going down into the legs or buttocks.  · You have pain that does not improve in one week.  · You have night pain.  · You lose weight.  · You have a fever or chills.  SEEK IMMEDIATE MEDICAL CARE IF:   · You develop new bowel or bladder control problems.  · You have unusual weakness or numbness in your arms or legs.  · You develop nausea or vomiting.  · You develop abdominal pain.  · You feel faint.     This information is not intended to replace advice given to you by your health care provider. Make sure you discuss any questions you have with your health care provider.     Document Released: 12/18/2006 Document Revised: 01/08/2016 Document Reviewed: 04/21/2015  [email or web address] Interactive Patient Education ©2016 [email or web address]            Patient Information     Patient Information    Following emergency  treatment: all patient requiring follow-up care must return either to a private physician or a clinic if your condition worsens before you are able to obtain further medical attention, please return to the emergency room.     Billing Information    At [facility], we work to make the billing process streamlined for our patients.  Our Representatives are here to answer any questions you may have regarding your hospital bill.  If you have insurance coverage and have supplied your insurance information to us, we will submit a claim to your insurer on your behalf.  Should you have any questions regarding your bill, we can be reached online or by phone as follows:  Online: You are able pay your bills online or live chat with our representatives about any billing questions you may have. We are here to help Monday - Friday from 8:00am to 7:30pm and 9:00am - 12:00pm on Saturdays.  Please visit [email or web address]  for more information.   Phone:  [phone] or [phone]    Please note that your emergency physician, surgeon, pathologist, radiologist, anesthesiologist, and other specialists are not employed by [facility] and will therefore bill separately for their services.  Please contact them directly for any questions concerning their bills at the numbers below:     Emergency Physician Services:  [phone]  [facility]:  [phone]  [facility]:  [phone]  [facility]:  [phone]    1. Your final bill may vary from the amount quoted upon discharge if all procedures are not complete at that time, or if your doctor has additional procedures of which we are not aware. You will receive an additional bill if you return to the Emergency Department at [facility] for suture removal regardless of the facility of which the sutures were placed.     2. Please arrange for settlement of this account at the emergency  registration.    3. All self-pay accounts are due in full at the time of treatment.  If you are unable to meet this obligation then payment is expected within 4-5 days.     4. If you have had radiology studies (CT, X-ray, Ultrasound, MRI), you have received a preliminary result during your emergency department visit. Please contact the radiology department [phone] to receive a copy of your final result. Please discuss the Final result with your primary physician or with the follow up physician provided.     Crisis Hotline:  [place] Crisis Hotline:  4-056-RJBECBI or 1-477.806.9132  [place] Crisis Hotline:    1-727.499.4328 or 540-091-5154         ED Discharge Follow Up Questions    1. In order to provide you with very good care, we would like to follow up with a phone call in the next few days.  May we have your permission to contact you?     YES /  NO    2. What is the best phone number to call you? (       )_____-__________    3. What is the best time to call you?      Morning  /  Afternoon  /  Evening                   Patient Signature:  ____________________________________________________________    Date:  ____________________________________________________________      Your appointments     Jan 19, 2017  4:30 PM   Established Patient with Nicho Tovar M.D.   [facility] ([facility])    [address] [place] [id number]   [phone]           You will be receiving a confirmation call a few days before your appointment from our automated call confirmation system.

## 2017-01-16 NOTE — ED AVS SNAPSHOT
Qlue Access Code: Activation code not generated  Current Qlue Status: Patient Declined    Your email address is not on file at [facility].  Email Addresses are required for you to sign up for Qlue, please contact [phone] to verify your personal information and to provide your email address prior to attempting to register for Qlue.    Luis Alfredo Javed  [address] [place] [place] [address]    Qlue  A secure, online tool to manage your health information     [facility]’s Qlue® is a secure, online tool that connects you to your personalized health information from the privacy of your home -- day or night - making it very easy for you to manage your healthcare. Once the activation process is completed, you can even access your medical information using the Qlue yovana, which is available for free in the Apple Yovana store or Google Play store.     To learn more about Qlue, visit [email or web address]    There are two levels of access available (as shown below):   My Chart Features  [facility] Primary Care Doctor [facility]  Specialists [facility]  Urgent  Care Non-[facility] Primary Care Doctor   Email your healthcare team securely and privately 24/7 X X X    Manage appointments: schedule your next appointment; view details of past/upcoming appointments X      Request prescription refills. X      View recent personal medical records, including lab and immunizations X X X X   View health record, including health history, allergies, medications X X X X   Read reports about your outpatient visits, procedures, consult and ER notes X X X X   See your discharge summary, which is a recap of your hospital and/or ER visit that includes your diagnosis, lab results, and care plan X X  X     How to register for [email or web address]:  Once your e-mail address has been verified, follow the following steps to sign up for [email or web address].     1. Go to  [email or web address]  2. Click on the Sign Up Now box, which takes you to the  New Member Sign Up page. You will need to provide the following information:  a. Enter your S[employer] Access Code exactly as it appears at the top of this page. (You will not need to use this code after you’ve completed the sign-up process. If you do not sign up before the expiration date, you must request a new code.)   b. Enter your date of birth.   c. Enter your home email address.   d. Click Submit, and follow the next screen’s instructions.  3. Create a Soshowise ID. This will be your Soshowise login ID and cannot be changed, so think of one that is secure and easy to remember.  4. Create a Sos[employer]ise password. You can change your password at any time.  5. Enter your Password Reset Question and Answer. This can be used at a later time if you forget your password.   6. Enter your e-mail address. This allows you to receive e-mail notifications when new information is available in Soshowise.  7. Click Sign Up. You can now view your health information.    For assistance activating your S[employer] account, call [phone]

## 2017-01-16 NOTE — ED PROVIDER NOTES
ED Provider Note    Scribed for Dayday Holder M.D. by Harika Kirkland. 1/16/2017  8:47 AM    Primary Care Provider: Nicho Tovar M.D.  Means of arrival: Walk-in  History limited by: None    CHIEF COMPLAINT  Chief Complaint   Patient presents with   • Back Pain   • RUQ Pain       HPI  Luis Alfredo Javed is a 36 y.o. male with a history of diabetes who presents to the ED complaining of worsening right-sided back pain onset 2 weeks ago. The patient reports of experiencing worsened sciatica 6 weeks ago with pain radiating down left lower extremity after sustaining a ground level fall. He states the back pain began to slowly subside when he suddenly began developing constant right-sided back pain 2.5 weeks ago with minimal relief using a heat pad. 5 days later, the back pain began radiating to right upper quadrant that has been intermittent and sharp in nature. He states that coughing and movement exacerbates the abdominal pain with no relieving factors. The patient reports of intermittent left foot tingling that occurs only when something touches his foot after laying down in bed, but otherwise denies any headache, leg swelling, hemoptysis, blurred vision, sore throat, congestion, chest pain, vomiting, weight gain/loss, diarrhea, skin rashes, or depression. The patient reports being on steroids recently for sciatica and been on a bariatric weight loss program. The patient works as a  company.     Risk factors for Pulmonary Embolism: decreased mobility, but no hormone replacement. No unilateral leg swelling or hemoptysis.     REVIEW OF SYSTEMS    CONSTITUTIONAL:  Denies weight gain/loss  EYES:  Denies blurry vision  ENT:  Denies sore throat, congestion  CARDIOVASCULAR:  Denies chest pain  RESPIRATORY:  Denies hemoptysis  GI: Right upper quadrant tenderness. Denies nausea, vomiting, or diarrhea.  MUSCULOSKELETAL: Right-sided back pain, denies leg swelling,   SKIN:  No rash or bruising.  NEUROLOGIC:  Intermittent left foot tingling. Denies headache  PSYCHIATRIC:  Denies depression.    PAST MEDICAL HISTORY  Past Medical History   Diagnosis Date   • Hypertension    • Chest pain      x 5 years.   • Type II or unspecified type diabetes mellitus without mention of complication, not stated as uncontrolled        FAMILY HISTORY  Family History   Problem Relation Age of Onset   • Diabetes Mother    • Stroke Father    • Alcohol/Drug Maternal Grandfather    • Diabetes Paternal Grandfather    • Cancer Paternal Grandfather      lung   • Heart Disease Neg Hx        SOCIAL HISTORY   reports that he has been smoking Cigarettes.  He has a 31.5 pack-year smoking history. He has never used smokeless tobacco. He reports that he drinks alcohol. He reports that he does not use illicit drugs. He works as a .     SURGICAL HISTORY  Past Surgical History   Procedure Laterality Date   • Hernia repair  age 4   • Strabismus repair  age 5       CURRENT MEDICATIONS  No current facility-administered medications for this encounter.    Current outpatient prescriptions:   •  sulfamethoxazole-trimethoprim (BACTRIM DS) 800-160 MG tablet, Take 1 Tab by mouth 2 times a day., Disp: 20 Tab, Rfl: 0  •  oxycodone-acetaminophen (PERCOCET-10)  MG Tab, Take 1 Tab by mouth every 6 hours as needed for Severe Pain for up to 28 days., Disp: 30 Tab, Rfl: 0  •  ranitidine (ZANTAC) 300 MG tablet, TAKE 1 TAB BY MOUTH EVERY DAY., Disp: 30 Tab, Rfl: 6  •  cyclobenzaprine (FLEXERIL) 10 MG Tab, Take 1 Tab by mouth 3 times a day as needed., Disp: 90 Tab, Rfl: 1  •  predniSONE (DELTASONE) 10 MG Tab, 4 by mouth daily ×3 days, 3 by mouth daily ×3 days, 2 by mouth daily ×3 days, 1 by mouth daily ×3 days and stop, Disp: 30 Tab, Rfl: 0  •  MethylPREDNISolone (MEDROL DOSEPAK) 4 MG Tablet Therapy Pack, Use as directed, Disp: 1 Kit, Rfl: 0  •  metformin (GLUCOPHAGE) 500 MG Tab, TAKE 2 TABS BY MOUTH 2 TIMES A DAY, WITH MEALS., Disp: 120 Tab, Rfl: 3  •   buPROPion SR (WELLBUTRIN-SR) 100 MG TABLET SR 12 HR, Take 1 Tab by mouth 2 times a day., Disp: 60 Tab, Rfl: 2  •  lisinopril (PRINIVIL) 10 MG Tab, Take 1 Tab by mouth every day., Disp: 30 Tab, Rfl: 11  •  TRUETEST TEST strip, USE TO TEST 2 TIMES A DAY, Disp: 100 Strip, Rfl: 6  •  metformin (GLUCOPHAGE) 500 MG Tab, Take 2 Tabs by mouth 2 times a day, with meals., Disp: 120 Tab, Rfl: 6  •  metoprolol SR (TOPROL XL) 50 MG TABLET SR 24 HR, Take 1 Tab by mouth every day., Disp: 30 Tab, Rfl: 11  •  sertraline (ZOLOFT) 100 MG Tab, Take 1 Tab by mouth every day., Disp: 30 Tab, Rfl: 11  •  Blood Glucose Monitoring Suppl SUPPLIES Misc, Test strips order: Test strips for True Test meter. Sig: use 2x and prn Diagnosis: Type 2 diabetes,, obese, uncontrolled, Disp: 100 Each, Rfl: 3  •  aspirin EC (ECOTRIN) 81 MG TBEC, Take 81 mg by mouth every day., Disp: , Rfl:     ALLERGIES  No Known Allergies    PHYSICAL EXAM  VITAL SIGNS: /91 mmHg  Pulse 119  Temp(Src) 35.8 °C (96.5 °F)  Resp 18  Wt 163.2 kg (359 lb 12.7 oz)  SpO2 97%     Constitutional: Patient is awake and alert. No acute respiratory distress. Well developed, Well nourished, Non-toxic appearance.  HENT: Normocephalic, Atraumatic, Bilateral external ears normal, Oropharynx pink moist with no exudates, Nose patent.  Eyes: PERRLA, EOMI, Sclera and conjunctiva clear, No discharge.   Neck:  Supple no nuchal rigidity, no thyromegaly or mass. Non-tender  Lymphatic: No supraclavicular lymph nodes.   Cardiovascular: Tachycardic, Heart is regular rhythm no murmur, rub or thrill.   Thorax & Lungs: Chest is symmetrical, with good breath sounds. No wheezing or crackles. No respiratory distress, No chest tenderness.   Abdomen: Obese, Soft, Well-healed surgical scar inferior navel. Vague tenderness in right upper quadrant with deep palpation. No tenderness to LUQ, RLQ or LLQ.no hepatosplenomegaly there is no guarding or rebound, No masses, No pulsatile masses.   Skin: Warm,  Dry, no petechia, purpura, or rash.   Back: Tenderness to mid-thoracic area, No CVA tenderness.   Extremities: No edema. Non tender.   Musculoskeletal: Good range of motion to wrists, elbows, shoulders, hips, knees, and ankles. Pulses 2+ radially and femorally. No gross deformities noted.   Neurologic: Alert & oriented to person, time, and place.  Strength is 5 over 5 and symmetric in bilateral upper and lower extremities.  Sensory is intact to light touch to face, arms, and legs.  DTRs are symmetrical in biceps brachioradialis, patella and Achilles.   Psychiatric: Normal affect    EKG  0959- 13 Lead EKG interpreted by me shows normal sinus rhythm at rate 95.  . Axis , No ST elevations. No old EKG for comparison.     LABS  Results for orders placed or performed during the hospital encounter of 01/16/17   CBC WITH DIFFERENTIAL   Result Value Ref Range    WBC 8.0 4.8 - 10.8 K/uL    RBC 4.73 4.70 - 6.10 M/uL    Hemoglobin 15.9 14.0 - 18.0 g/dL    Hematocrit 45.5 42.0 - 52.0 %    MCV 96.2 81.4 - 97.8 fL    MCH 33.6 (H) 27.0 - 33.0 pg    MCHC 34.9 33.7 - 35.3 g/dL    RDW 48.8 35.9 - 50.0 fL    Platelet Count 197 164 - 446 K/uL    MPV 10.1 9.0 - 12.9 fL    Neutrophils-Polys 60.80 44.00 - 72.00 %    Lymphocytes 30.30 22.00 - 41.00 %    Monocytes 7.60 0.00 - 13.40 %    Eosinophils 0.10 0.00 - 6.90 %    Basophils 0.60 0.00 - 1.80 %    Immature Granulocytes 0.60 0.00 - 0.90 %    Nucleated RBC 0.00 /100 WBC    Neutrophils (Absolute) 4.87 1.82 - 7.42 K/uL    Lymphs (Absolute) 2.43 1.00 - 4.80 K/uL    Monos (Absolute) 0.61 0.00 - 0.85 K/uL    Eos (Absolute) 0.01 0.00 - 0.51 K/uL    Baso (Absolute) 0.05 0.00 - 0.12 K/uL    Immature Granulocytes (abs) 0.05 0.00 - 0.11 K/uL    NRBC (Absolute) 0.00 K/uL   COMP METABOLIC PANEL   Result Value Ref Range    Sodium 133 (L) 135 - 145 mmol/L    Potassium 4.4 3.6 - 5.5 mmol/L    Chloride 102 96 - 112 mmol/L    Co2 23 20 - 33 mmol/L    Anion Gap 8.0 0.0 - 11.9    Glucose 148 (H)  65 - 99 mg/dL    Bun 16 8 - 22 mg/dL    Creatinine 0.77 0.50 - 1.40 mg/dL    Calcium 9.8 8.5 - 10.5 mg/dL    AST(SGOT) 22 12 - 45 U/L    ALT(SGPT) 49 2 - 50 U/L    Alkaline Phosphatase 101 (H) 30 - 99 U/L    Total Bilirubin 0.5 0.1 - 1.5 mg/dL    Albumin 4.5 3.2 - 4.9 g/dL    Total Protein 7.6 6.0 - 8.2 g/dL    Globulin 3.1 1.9 - 3.5 g/dL    A-G Ratio 1.5 g/dL   TROPONIN   Result Value Ref Range    Troponin I 0.04 0.00 - 0.04 ng/mL   BTYPE NATRIURETIC PEPTIDE   Result Value Ref Range    B Natriuretic Peptide 42 0 - 100 pg/mL   URINALYSIS (UA)   Result Value Ref Range    Micro Urine Req Microscopic     Color Yellow     Character Clear     Specific Gravity 1.023 <1.035    Ph 5.5 5.0-8.0    Glucose Negative Negative mg/dL    Ketones Negative Negative mg/dL    Protein 30 (A) Negative mg/dL    Bilirubin Negative Negative    Nitrite Negative Negative    Leukocyte Esterase Negative Negative    Occult Blood Negative Negative   URINE MICROSCOPIC (W/UA)   Result Value Ref Range    WBC 0-2 (A) /hpf    RBC 0-2 (A) /hpf    Epithelial Cells Few /hpf    Mucous Threads Moderate /hpf    Amorphous Crystal Present /hpf    Hyaline Cast 0-2 /lpf   ESTIMATED GFR   Result Value Ref Range    GFR If African American >60 >60 mL/min/1.73 m 2    GFR If Non African American >60 >60 mL/min/1.73 m 2   All labs reviewed by me.    RADIOLOGY/PROCEDURES  CT-CTA CHEST PULMONARY ARTERY W/ RECONS   Final Result      1.  No evidence pulmonary emboli.   2.  Dependent linear atelectasis both lungs. No consolidation or pleural effusion.   3.  Hepatic steatosis and mild splenomegaly.               CT-ABDOMEN-PELVIS WITH   Final Result      1.  No evidence of abdominal or pelvic mass, adenopathy, or inflammatory change.   2.  Hepatic steatosis   3.  Lower thoracic spondylosis               The radiologist's interpretations of all radiological studies have been reviewed by me.     COURSE & MEDICAL DECISION MAKING  Pertinent Labs & Imaging studies reviewed.  (See chart for details)    Differential diagnoses include but are not limited to gall bladder disease, kidney stone, pyelonephritis, PNA, PE, CAD, musculoskeletal pain in back or rib.     9:00 AM - Patient seen and examined at bedside. Ordered CT-CTA PE with, CT-abdomen-pelvis with, estimated GFR,  urinalysis UA, CBC, CMP, troponin, BNP, and EKG.  Patient will be medicated with normal saline infusion, morphine 4mg IV, and Zofran 4mg IV for his symptoms.     11:22 AM Reviewed the patient's lab and imaging results, which are noted above.     11:25 AM Patient was reevaluated at bedside. The patient is resting comfortably in bed. Discussed lab and radiology results with the patient and informed them that results were unremarkable for UTI, PE, or PNA. Therefore, I suspect symptoms are likely due to musculoskeletal pain. He is advised to make an appointment with his PCP sooner than his expected appointment on Thursday and inform them regarding today's visit and possibly schedule for physical therapy. He states he has medicated with Valium with minimal relief and Flexeril without relief at home. The patient will be discharged with Valium 5mg PO and should return if symptoms worsen or if new symptoms arise. The patient understands and agrees to plan.     11:45 AM Patient has requested to speak with me. He is requesting to have prescription Capsaicin for the back pain, so he will also be discharged with Capsaicin topica cream script.     Decision Making  Patient presents with right mid lateral back pain into his rib cage area initially had his usual sciatic nerve pain that was getting better but then developed this pain to the right posterior chest. Hurts more when he moves around takes deep breath or coughs. His examination revealed a lot of tenderness to the right lateral chest wall but no obvious infections. CAT scan was done to rule out pulmonary embolus or pneumonia. Showed atelectasis only. CAT scan through his abdomen  did not show any obvious surgical abnormalities. Here in emergency room we titrated some pain medications. A long discussion with him and explained to him that he needs close follow up as an outpatient. We'll try him on some oral muscle relaxants like Valium. He also requested capsaicin prescription which I have written form as well. This time he will follow up with his primary care physicians. Most likely this is musculoskeletal in nature. No signs of pulmonary embolus or infectious causes.    The patient will return for new or worsening symptoms and is stable at the time of discharge.    The patient is referred to a primary physician for all other preventative health concerns.    DISPOSITION:  Patient will be discharged home in stable condition.    FOLLOW UP:  Nicho Tovar M.D.  [address] [place] [address] [phone]    In 2 days      OUTPATIENT MEDICATIONS:  New Prescriptions    DIAZEPAM (VALIUM) 5 MG TAB    Take 2 Tabs by mouth every 6 hours as needed (vertigo). No driving, no drinking alcohol     FINAL IMPRESSION  1. Pain, upper back      PLAN  1. Follow-up primary care doctor within 2 days  2. Back pain/muscular skeletal pain information sheets  3. Valium 5-10 mg every 8 hours. Muscle spasm no drinking alcohol or driving.  4. Return to the emergency department for increased pains, fevers, vomiting or change in condition.     IHarika (Marina), am scribing for, and in the presence of, Dayday Holder M.D..    Electronically signed by: Harika Neely), 1/16/2017    Dayday STOUT M.D. personally performed the services described in this documentation, as scribed by Harika Kirkland in my presence, and it is both accurate and complete.    The note accurately reflects work and decisions made by me.  Dayday Holder  1/16/2017  1:00 PM

## 2017-01-18 ENCOUNTER — HOSPITAL ENCOUNTER (OUTPATIENT)
Dept: RADIOLOGY | Facility: MEDICAL CENTER | Age: 37
End: 2017-01-18
Attending: FAMILY MEDICINE
Payer: MEDICAID

## 2017-01-18 DIAGNOSIS — M54.32 LEFT SIDED SCIATICA: ICD-10-CM

## 2017-01-18 PROCEDURE — 72100 X-RAY EXAM L-S SPINE 2/3 VWS: CPT

## 2017-01-18 PROCEDURE — 73502 X-RAY EXAM HIP UNI 2-3 VIEWS: CPT | Mod: LT

## 2017-01-19 ENCOUNTER — OFFICE VISIT (OUTPATIENT)
Dept: MEDICAL GROUP | Facility: MEDICAL CENTER | Age: 37
End: 2017-01-19
Attending: FAMILY MEDICINE
Payer: MEDICAID

## 2017-01-19 VITALS
DIASTOLIC BLOOD PRESSURE: 80 MMHG | WEIGHT: 315 LBS | HEART RATE: 88 BPM | HEIGHT: 74 IN | BODY MASS INDEX: 40.43 KG/M2 | TEMPERATURE: 97.3 F | SYSTOLIC BLOOD PRESSURE: 110 MMHG | OXYGEN SATURATION: 96 % | RESPIRATION RATE: 16 BRPM

## 2017-01-19 DIAGNOSIS — R10.9 RIGHT FLANK PAIN: ICD-10-CM

## 2017-01-19 DIAGNOSIS — E66.9 DIABETES MELLITUS TYPE 2 IN OBESE (HCC): ICD-10-CM

## 2017-01-19 DIAGNOSIS — R10.9 ACUTE RIGHT FLANK PAIN: ICD-10-CM

## 2017-01-19 DIAGNOSIS — F10.10 ALCOHOL ABUSE, EPISODIC: ICD-10-CM

## 2017-01-19 DIAGNOSIS — E11.69 DIABETES MELLITUS TYPE 2 IN OBESE (HCC): ICD-10-CM

## 2017-01-19 DIAGNOSIS — E66.01 MORBID OBESITY DUE TO EXCESS CALORIES (HCC): ICD-10-CM

## 2017-01-19 DIAGNOSIS — M54.32 LEFT SIDED SCIATICA: ICD-10-CM

## 2017-01-19 PROCEDURE — 99213 OFFICE O/P EST LOW 20 MIN: CPT | Performed by: FAMILY MEDICINE

## 2017-01-19 PROCEDURE — 99214 OFFICE O/P EST MOD 30 MIN: CPT | Performed by: FAMILY MEDICINE

## 2017-01-19 RX ORDER — GABAPENTIN 300 MG/1
300 CAPSULE ORAL 3 TIMES DAILY
Qty: 90 CAP | Refills: 1 | Status: SHIPPED | OUTPATIENT
Start: 2017-01-19 | End: 2017-03-24 | Stop reason: SDUPTHER

## 2017-01-19 RX ORDER — OXYCODONE AND ACETAMINOPHEN 10; 325 MG/1; MG/1
1 TABLET ORAL EVERY 6 HOURS PRN
Qty: 15 TAB | Refills: 0 | Status: SHIPPED | OUTPATIENT
Start: 2017-01-19 | End: 2017-02-16

## 2017-01-19 NOTE — MR AVS SNAPSHOT
"        Luis Alfredo Casimiro Javed   2017 2:30 PM   Office Visit   MRN: [id number]    [facility]   Dept Phone:  [phone]    Description:  Male : 1980   Provider:  Nicho Tovar M.D.           Reason for Visit     Follow-Up           Allergies as of 2017     No Known Allergies      You were diagnosed with     Left sided sciatica   [507931]       Alcohol abuse, episodic   [305.02.ICD-9-CM]       Right flank pain   [398890]       Diabetes mellitus type 2 in obese (CMS-HCC)   [833763]         Vital Signs     Blood Pressure Pulse Temperature Respirations Height Weight    110/80 mmHg 88 36.3 °C (97.3 °F) 16 1.88 m (6' 2.02\") 162.388 kg (358 lb)    Body Mass Index Oxygen Saturation Smoking Status             45.94 kg/m2 96% Current Every Day Smoker         Basic Information     Date Of Birth Sex Race Ethnicity Preferred Language    1980 Male White Non- English      Your appointments     2017  7:30 AM   Established Patient with Nicho Tovar M.D.   [facility]    [address] [place] [id number]   [phone]           You will be receiving a confirmation call a few days before your appointment from our automated call confirmation system.              Problem List              ICD-10-CM Priority Class Noted - Resolved    HTN (hypertension) I10   12/10/2014 - Present    Chest pain R07.9   12/10/2014 - Present    Diabetes mellitus type 2 in obese (CMS-HCC) E11.9, E66.9   2014 - Present    Hypertriglyceridemia E78.1   2015 - Present    GERD (gastroesophageal reflux disease) K21.9   2015 - Present    ALCOHOL ABUSE, EPISODIC    2015 - Present    Tobacco abuse counseling Z71.6   2015 - Present    Morbid obesity (CMS-HCC) E66.01   2015 - Present    Anxiety F41.9   2016 - Present    Sleep apnea in adult G47.33   2016 - Present    Otalgia of both ears H92.03   2016 - Present    Left sided sciatica " M54.32   1/3/2017 - Present    Cellulitis of extremity L03.119   1/3/2017 - Present    Alcohol abuse, episodic F10.10   1/19/2017 - Present      Health Maintenance        Date Due Completion Dates    IMM HEP B VACCINE (1 of 3 - Primary Series) 1980 ---    IMM DTaP/Tdap/Td Vaccine (1 - Tdap) 5/6/1997 ---    IMM PNEUMOCOCCAL 19-64 (ADULT) MEDIUM RISK SERIES (1 of 1 - PPSV23) 5/6/1997 ---    IMM INFLUENZA (1) 9/1/2016 ---    DIABETES MONOFILAMENT / LE EXAM 12/10/2016 12/10/2015 (Done)    Override on 12/10/2015: Done    A1C SCREENING 2/16/2017 8/16/2016, 1/5/2016, 8/22/2015, 5/6/2015, 12/31/2014    URINE ACR / MICROALBUMIN 3/9/2017 3/9/2016, 5/6/2015, 12/31/2014    FASTING LIPID PROFILE 8/16/2017 8/16/2016, 5/6/2015, 12/31/2014    RETINAL SCREENING 10/31/2017 10/31/2016    SERUM CREATININE 1/16/2018 1/16/2017, 1/5/2016, 8/22/2015, 3/2/2015, 12/10/2014, 5/26/2009            Current Immunizations     No immunizations on file.      Below and/or attached are the medications your provider expects you to take. Review all of your home medications and newly ordered medications with your provider and/or pharmacist. Follow medication instructions as directed by your provider and/or pharmacist. Please keep your medication list with you and share with your provider. Update the information when medications are discontinued, doses are changed, or new medications (including over-the-counter products) are added; and carry medication information at all times in the event of emergency situations     Allergies:  No Known Allergies          Medications  Valid as of: January 19, 2017 -  3:25 PM    Generic Name Brand Name Tablet Size Instructions for use    Aspirin (Tablet Delayed Response) ECOTRIN 81 MG Take 81 mg by mouth every day.        Blood Glucose Monitoring Suppl (Misc) Blood Glucose Monitoring Suppl SUPPLIES Test strips order: Test strips for True Test meter. Sig: use 2x and prn  Diagnosis: Type 2 diabetes,, obese, uncontrolled         BuPROPion HCl (TABLET SR 12 HR) WELLBUTRIN- MG Take 1 Tab by mouth 2 times a day.        Capsaicin (Cream) ZOSTRIX 0.075 % Apply 1 Application to affected area(s) 3 times a day.        Cyclobenzaprine HCl (Tab) FLEXERIL 10 MG Take 1 Tab by mouth 3 times a day as needed.        DiazePAM (Tab) VALIUM 5 MG Take 2 Tabs by mouth every 6 hours as needed (vertigo). No driving, no drinking alcohol        Gabapentin (Cap) NEURONTIN 300 MG Take 1 Cap by mouth 3 times a day.        Glucose Blood (Strip) TRUETEST TEST  USE TO TEST 2 TIMES A DAY        Lisinopril (Tab) PRINIVIL 10 MG Take 1 Tab by mouth every day.        MetFORMIN HCl (Tab) GLUCOPHAGE 500 MG Take 2 Tabs by mouth 2 times a day, with meals.        MetFORMIN HCl (Tab) GLUCOPHAGE 500 MG TAKE 2 TABS BY MOUTH 2 TIMES A DAY, WITH MEALS.        MethylPREDNISolone (Tablet Therapy Pack) MEDROL DOSEPAK 4 MG Use as directed        Metoprolol Succinate (TABLET SR 24 HR) TOPROL XL 50 MG Take 1 Tab by mouth every day.        Oxycodone-Acetaminophen (Tab) PERCOCET-10  MG Take 1 Tab by mouth every 6 hours as needed for Severe Pain for up to 28 days.        PredniSONE (Tab) DELTASONE 10 MG 4 by mouth daily ×3 days, 3 by mouth daily ×3 days, 2 by mouth daily ×3 days, 1 by mouth daily ×3 days and stop        RaNITidine HCl (Tab) ZANTAC 300 MG TAKE 1 TAB BY MOUTH EVERY DAY.        Sertraline HCl (Tab) ZOLOFT 100 MG Take 1 Tab by mouth every day.        .                 Medicines prescribed today were sent to:     [id number] - [place], [place] - [address]    [address] [address]    Phone: [phone] Fax: [phone]    Open 24 Hours?: No      Medication refill instructions:       If your prescription bottle indicates you have medication refills left, it is not necessary to call your provider’s office. Please contact your pharmacy and they will refill your medication.    If your prescription bottle indicates you do not have  any refills left, you may request refills at any time through one of the following ways: The online [employer] system (except Urgent Care), by calling your provider’s office, or by asking your pharmacy to contact your provider’s office with a refill request. Medication refills are processed only during regular business hours and may not be available until the next business day. Your provider may request additional information or to have a follow-up visit with you prior to refilling your medication.   *Please Note: Medication refills are assigned a new Rx number when refilled electronically. Your pharmacy may indicate that no refills were authorized even though a new prescription for the same medication is available at the pharmacy. Please request the medicine by name with the pharmacy before contacting your provider for a refill.        Other Notes About Your Plan     Fall risk done 5/28/15           [employer] Status: Patient Declined

## 2017-01-20 NOTE — ASSESSMENT & PLAN NOTE
Patient reports continued steady improvement in the intensity of left-sided back pain radiating down into his left leg. He did have a fall on the ice at a gas station one week ago which transiently increased his level of pain. He finished his Percocet approximately one week ago. He denies any urinary or fecal incontinence. Patient reports new onset of painful needle type pain if he rests the dorsum of his left foot on the sheets when lying prone at night. This is just developed in the past week. He has chronic decreased touch on the plantar surface of both feet worse on the right. (Diabetic peripheral neuropathy)

## 2017-01-20 NOTE — ASSESSMENT & PLAN NOTE
Patient lilliana is only in isolated heavy drinking on 2 occasions in the past 5 weeks. On both those events patient reports consuming 8-16 shots of bourbon or tequila. He reports that this is much reduced in frequency from once or twice per week in the past. He denies any syncope, alcohol-related seizures, tremor.

## 2017-01-20 NOTE — ASSESSMENT & PLAN NOTE
Patient reports initially mild right flank pain noted shortly before her last visit has significantly intensified over the past 7-10 days. Patient denies any recent history of trauma to this area. He denies accompanying cough, shortness of breath, fever, dysuria. Appetite is unremarkable. Due to persistent pain patient presented to [facility] emergency room for evaluation 1/16/17. Evaluation showed unremarkable CT angiogram of the chest with no evidence of pulmonary embolism, unremarkable CBC, chemistry panel, cardiac enzymes, EKG as well.

## 2017-01-20 NOTE — PROGRESS NOTES
Chief Complaint   Patient presents with   • Follow-Up       HISTORY OF PRESENT ILLNESS: Patient is a 36 y.o. male established patient who presents today to follow-up on left-sided sciatica with recent new left foot pain, acute right flank/chest pain, episodic alcohol abuse, morbid obesity        Left sided sciatica  Patient reports continued steady improvement in the intensity of left-sided back pain radiating down into his left leg. He did have a fall on the ice at a gas station one week ago which transiently increased his level of pain. He finished his Percocet approximately one week ago. He denies any urinary or fecal incontinence. Patient reports new onset of painful needle type pain if he rests the dorsum of his left foot on the sheets when lying prone at night. This is just developed in the past week. He has chronic decreased touch on the plantar surface of both feet worse on the right. (Diabetic peripheral neuropathy)    Acute right flank pain  Patient reports initially mild right flank pain noted shortly before her last visit has significantly intensified over the past 7-10 days. Patient denies any recent history of trauma to this area. He denies accompanying cough, shortness of breath, fever, dysuria. Appetite is unremarkable. Due to persistent pain patient presented to [facility] emergency room for evaluation 1/16/17. Evaluation showed unremarkable CT angiogram of the chest with no evidence of pulmonary embolism, unremarkable CBC, chemistry panel, cardiac enzymes, EKG as well.    ALCOHOL ABUSE, EPISODIC  Patient ports is only in isolated heavy drinking on 2 occasions in the past 5 weeks. On both those events patient reports consuming 8-16 shots of bourbon or tequila. He reports that this is much reduced in frequency from once or twice per week in the past. He denies any syncope, alcohol-related seizures, tremor.    Morbid obesity  Patient recently lost 8 pounds. He denies any on his plantar loss or cold  intolerance.      Patient Active Problem List    Diagnosis Date Noted   • Alcohol abuse, episodic 01/19/2017   • Acute right flank pain 01/19/2017   • Left sided sciatica 01/03/2017   • Cellulitis of extremity 01/03/2017   • Otalgia of both ears 08/30/2016   • Sleep apnea in adult 06/06/2016   • Anxiety 01/26/2016   • Morbid obesity (CMS-HCC) 09/22/2015   • ALCOHOL ABUSE, EPISODIC 08/27/2015   • Tobacco abuse counseling 08/27/2015   • GERD (gastroesophageal reflux disease) 04/06/2015   • Hypertriglyceridemia 01/05/2015   • Diabetes mellitus type 2 in obese (CMS-HCC) 12/22/2014   • HTN (hypertension) 12/10/2014   • Chest pain 12/10/2014       Allergies:Review of patient's allergies indicates no known allergies.    Current Outpatient Prescriptions   Medication Sig Dispense Refill   • gabapentin (NEURONTIN) 300 MG Cap Take 1 Cap by mouth 3 times a day. 90 Cap 1   • oxycodone-acetaminophen (PERCOCET-10)  MG Tab Take 1 Tab by mouth every 6 hours as needed for Severe Pain for up to 28 days. 15 Tab 0   • diazepam (VALIUM) 5 MG Tab Take 2 Tabs by mouth every 6 hours as needed (vertigo). No driving, no drinking alcohol 15 Tab 0   • capsicum (ZOSTRIX) 0.075 % topical cream Apply 1 Application to affected area(s) 3 times a day. 1 Tube 1   • ranitidine (ZANTAC) 300 MG tablet TAKE 1 TAB BY MOUTH EVERY DAY. 30 Tab 6   • cyclobenzaprine (FLEXERIL) 10 MG Tab Take 1 Tab by mouth 3 times a day as needed. 90 Tab 1   • predniSONE (DELTASONE) 10 MG Tab 4 by mouth daily ×3 days, 3 by mouth daily ×3 days, 2 by mouth daily ×3 days, 1 by mouth daily ×3 days and stop 30 Tab 0   • MethylPREDNISolone (MEDROL DOSEPAK) 4 MG Tablet Therapy Pack Use as directed 1 Kit 0   • metformin (GLUCOPHAGE) 500 MG Tab TAKE 2 TABS BY MOUTH 2 TIMES A DAY, WITH MEALS. 120 Tab 3   • buPROPion SR (WELLBUTRIN-SR) 100 MG TABLET SR 12 HR Take 1 Tab by mouth 2 times a day. 60 Tab 2   • lisinopril (PRINIVIL) 10 MG Tab Take 1 Tab by mouth every day. 30 Tab 11   •  "TRUETEST TEST strip USE TO TEST 2 TIMES A  Strip 6   • metformin (GLUCOPHAGE) 500 MG Tab Take 2 Tabs by mouth 2 times a day, with meals. 120 Tab 6   • metoprolol SR (TOPROL XL) 50 MG TABLET SR 24 HR Take 1 Tab by mouth every day. 30 Tab 11   • sertraline (ZOLOFT) 100 MG Tab Take 1 Tab by mouth every day. 30 Tab 11   • Blood Glucose Monitoring Suppl SUPPLIES Misc Test strips order: Test strips for True Test meter. Sig: use 2x and prn  Diagnosis: Type 2 diabetes,, obese, uncontrolled 100 Each 3   • aspirin EC (ECOTRIN) 81 MG TBEC Take 81 mg by mouth every day.       No current facility-administered medications for this visit.      Social history-, working  Social History   Substance Use Topics   • Smoking status: Current Every Day Smoker -- 1.50 packs/day for 21 years     Types: Cigarettes   • Smokeless tobacco: Never Used   • Alcohol Use: 0.0 oz/week     0 Standard drinks or equivalent per week      Comment: 3 to 4 times weekly       Family History   Problem Relation Age of Onset   • Diabetes Mother    • Stroke Father    • Alcohol/Drug Maternal Grandfather    • Diabetes Paternal Grandfather    • Cancer Paternal Grandfather      lung   • Heart Disease Neg Hx        ROS:  Review of Systems   Constitutional: Negative for fever, chills, weight loss and malaise/fatigue.   Eyes: Negative for blurred vision.   Respiratory: Negative for cough, sputum production, shortness of breath and wheezing.    Cardiovascular: Negative for chest pain, palpitations, orthopnea and leg swelling.   Gastrointestinal: Negative for heartburn, nausea, vomiting and abdominal pain.    Endo/Heme/Allergies: Does not bruise/bleed easily.               Exam:  Blood pressure 110/80, pulse 88, temperature 36.3 °C (97.3 °F), resp. rate 16, height 1.88 m (6' 2.02\"), weight 162.388 kg (358 lb), SpO2 96 %.  General:  Well nourished, well developed male in NAD  Head is grossly normal.  Neck: Supple without JVD or bruit. Thyroid is not enlarged. " Trachea is midline.  Pulmonary: Clear to ausculation .  Normal effort. No rales, ronchi, or wheezing. 1+ tender to light touch just under the right inferior scapular border with no overlying redness or vesicles  Cardiovascular: Regular rate and rhythm without murmur.  Abdomen-Abdomen is soft, normal bowel sounds, no masses, guarding, ororganomegaly, or tenderness.  Lower extremities- neg for pretibial edema, redness, tenderness. With shoes and socks removed monofilament exam shows 7 out of 10 touch on the left sole, 5 out of 10 on the right. No ulcers, scaling or swelling seen    Imaging: Plain films of the lumbar spine and left hip are unremarkable, reviewed with patient    Please note that this dictation was created using voice recognition software. I have made every reasonable attempt to correct obvious errors, but I expect that there are errors of grammar and possibly content that I did not discover before finalizing the note.    Assessment/Plan:  1. Left sided sciatica     2. Alcohol abuse, episodic     3. Right flank pain     4. Diabetes mellitus type 2 in obese (CMS-HCC)  Diabetic Monofilament Lower Extremity Exam   5. Acute right flank pain     6. Morbid obesity due to excess calories (CMS-HCC)         Plan: 1. Rx Percocet 10 mg, #15 to be used once daily for severe right-sided chest pain  2. Patient is cautioned to avoid even isolated heavy intakes of alcohol leading to intoxication  3. Pursue limited portions to achieve gradual weight loss  4. Revisit in 3 weeks

## 2017-01-23 RX ORDER — BUPROPION HYDROCHLORIDE 100 MG/1
TABLET, EXTENDED RELEASE ORAL
Qty: 60 TAB | Refills: 2 | Status: SHIPPED | OUTPATIENT
Start: 2017-01-23 | End: 2017-05-08 | Stop reason: SDUPTHER

## 2017-01-27 LAB — EKG IMPRESSION: NORMAL

## 2017-03-03 ENCOUNTER — OFFICE VISIT (OUTPATIENT)
Dept: MEDICAL GROUP | Facility: MEDICAL CENTER | Age: 37
End: 2017-03-03
Attending: FAMILY MEDICINE
Payer: MEDICAID

## 2017-03-03 VITALS
HEART RATE: 84 BPM | BODY MASS INDEX: 40.43 KG/M2 | DIASTOLIC BLOOD PRESSURE: 92 MMHG | SYSTOLIC BLOOD PRESSURE: 140 MMHG | WEIGHT: 315 LBS | TEMPERATURE: 97.4 F | OXYGEN SATURATION: 95 % | RESPIRATION RATE: 16 BRPM | HEIGHT: 74 IN

## 2017-03-03 DIAGNOSIS — E66.01 MORBID OBESITY DUE TO EXCESS CALORIES (HCC): ICD-10-CM

## 2017-03-03 DIAGNOSIS — F10.10 ALCOHOL ABUSE, EPISODIC: ICD-10-CM

## 2017-03-03 DIAGNOSIS — E66.9 DIABETES MELLITUS TYPE 2 IN OBESE (HCC): ICD-10-CM

## 2017-03-03 DIAGNOSIS — E11.69 DIABETES MELLITUS TYPE 2 IN OBESE (HCC): ICD-10-CM

## 2017-03-03 PROCEDURE — 99214 OFFICE O/P EST MOD 30 MIN: CPT | Performed by: FAMILY MEDICINE

## 2017-03-03 PROCEDURE — 99213 OFFICE O/P EST LOW 20 MIN: CPT | Performed by: FAMILY MEDICINE

## 2017-03-03 RX ORDER — SIMVASTATIN 10 MG
10 TABLET ORAL EVERY EVENING
Qty: 30 TAB | Refills: 11 | Status: SHIPPED | OUTPATIENT
Start: 2017-03-03 | End: 2018-03-16 | Stop reason: SDUPTHER

## 2017-03-03 NOTE — ASSESSMENT & PLAN NOTE
Patient has been intermittently compliant with diet weekly with problems calorie excess. Has been compliant taking metformin 1000 milligrams twice daily. Denies any symptomatically blood sugars characterized by headache or diaphoresis. Reports recent sugars have been in fairly good range between  when he does check.

## 2017-03-03 NOTE — PROGRESS NOTES
"Chief Complaint   Patient presents with   • Diabetes       HISTORY OF PRESENT ILLNESS: Patient is a 36 y.o. male established patient who presents today to follow-up on type 2 diabetes mellitus, episodic alcohol abuse, morbid obesity        Diabetes mellitus type 2 in obese  Patient has been intermittently compliant with diet weekly with problems calorie excess. Has been compliant taking metformin 1000 milligrams twice daily. Denies any symptomatically blood sugars characterized by headache or diaphoresis. Reports recent sugars have been in fairly good range between  when he does check.    ALCOHOL ABUSE, EPISODIC  Patient reports a recent increase in the frequency of heavy drinking (described as 5-12 shots of hard liquor per night). Patient ports that he has noticed that he is starting to drink more frequently following a stressful day. He does continue to work. He is considering avoiding alcohol for at least 30 days and reassessing his use of alcohol. He did drink heavily last night and notes mild headache and fatigue today suggestive of a \"hangover\". He denies any emesis or recent diarrhea.    Morbid obesity  Current weight is up 8 pounds. Patient reports that he has recently not been pursuing consistently smaller portions to achieve gradual weight loss. He is not reporting any hair loss or cold intolerance. Notes increased stress eating recently.      Patient Active Problem List    Diagnosis Date Noted   • Alcohol abuse, episodic 01/19/2017   • Acute right flank pain 01/19/2017   • Left sided sciatica 01/03/2017   • Cellulitis of extremity 01/03/2017   • Otalgia of both ears 08/30/2016   • Sleep apnea in adult 06/06/2016   • Anxiety 01/26/2016   • Morbid obesity (CMS-HCC) 09/22/2015   • ALCOHOL ABUSE, EPISODIC 08/27/2015   • Tobacco abuse counseling 08/27/2015   • GERD (gastroesophageal reflux disease) 04/06/2015   • Hypertriglyceridemia 01/05/2015   • Diabetes mellitus type 2 in obese (CMS-HCC) 12/22/2014 "   • HTN (hypertension) 12/10/2014   • Chest pain 12/10/2014     Social history-, working  Allergies:Review of patient's allergies indicates no known allergies.    Current Outpatient Prescriptions   Medication Sig Dispense Refill   • simvastatin (ZOCOR) 10 MG Tab Take 1 Tab by mouth every evening. 30 Tab 11   • gabapentin (NEURONTIN) 300 MG Cap Take 1 Cap by mouth 3 times a day. 90 Cap 1   • capsicum (ZOSTRIX) 0.075 % topical cream Apply 1 Application to affected area(s) 3 times a day. 1 Tube 1   • ranitidine (ZANTAC) 300 MG tablet TAKE 1 TAB BY MOUTH EVERY DAY. 30 Tab 6   • cyclobenzaprine (FLEXERIL) 10 MG Tab Take 1 Tab by mouth 3 times a day as needed. 90 Tab 1   • metformin (GLUCOPHAGE) 500 MG Tab TAKE 2 TABS BY MOUTH 2 TIMES A DAY, WITH MEALS. 120 Tab 3   • lisinopril (PRINIVIL) 10 MG Tab Take 1 Tab by mouth every day. 30 Tab 11   • TRUETEST TEST strip USE TO TEST 2 TIMES A  Strip 6   • metoprolol SR (TOPROL XL) 50 MG TABLET SR 24 HR Take 1 Tab by mouth every day. 30 Tab 11   • sertraline (ZOLOFT) 100 MG Tab Take 1 Tab by mouth every day. 30 Tab 11   • Blood Glucose Monitoring Suppl SUPPLIES Misc Test strips order: Test strips for True Test meter. Sig: use 2x and prn  Diagnosis: Type 2 diabetes,, obese, uncontrolled 100 Each 3   • aspirin EC (ECOTRIN) 81 MG TBEC Take 81 mg by mouth every day.       No current facility-administered medications for this visit.       Social History   Substance Use Topics   • Smoking status: Current Every Day Smoker -- 1.50 packs/day for 21 years     Types: Cigarettes   • Smokeless tobacco: Never Used   • Alcohol Use: 0.0 oz/week     0 Standard drinks or equivalent per week      Comment: 3 to 4 times weekly       Family History   Problem Relation Age of Onset   • Diabetes Mother    • Stroke Father    • Alcohol/Drug Maternal Grandfather    • Diabetes Paternal Grandfather    • Cancer Paternal Grandfather      lung   • Heart Disease Neg Hx        ROS:  Review of Systems  "  Constitutional: Negative for fever, chills, weight loss and malaise/fatigue.   Eyes: Negative for blurred vision.   Respiratory: Negative for cough, sputum production, shortness of breath and wheezing.    Cardiovascular: Negative for chest pain, palpitations, orthopnea and leg swelling.   Gastrointestinal: Negative for heartburn, nausea, vomiting and abdominal pain.   Endo/Heme/Allergies: Does not bruise/bleed easily.             Exam:  Blood pressure 140/92, pulse 84, temperature 36.3 °C (97.4 °F), resp. rate 16, height 1.88 m (6' 2.02\"), weight 166.017 kg (366 lb), SpO2 95 %.  General:  Well nourished, well developed male in NAD  Head is grossly normal.  Neck: Supple without JVD or bruit. Thyroid is not enlarged. Trachea is midline.  Pulmonary: Clear to ausculation .  Normal effort. No rales, ronchi, or wheezing.  Cardiovascular: Regular rate and rhythm without murmur.  Abdomen-Abdomen is soft, normal bowel sounds, no masses, guarding, ororganomegaly, or tenderness.  Lower extremities- neg for pretibial edema, redness, tenderness.      Please note that this dictation was created using voice recognition software. I have made every reasonable attempt to correct obvious errors, but I expect that there are errors of grammar and possibly content that I did not discover before finalizing the note.    Assessment/Plan:  1. Alcohol abuse, episodic     2. Morbid obesity due to excess calories (CMS-[facility])     3. Diabetes mellitus type 2 in obese (CMS-HCC)  HEMOGLOBIN A1C       Plan: 1.Patient was counseled today regarding importance of appropriate food selection, limiting food portion size, and were appropriate increasing weekly exercise efforts in order to foster healthy gradual weight loss. Potential health risks risks of current obesity reviewed.  2. Patient strongly encouraged to avoid alcohol entirely given past history of overuse  3. Begin simvastatin 10 mg by mouth daily  4. Update A1c  5. Revisit in one month  6. " Also recommended reduced consumption of tobacco-limit no more than 1 pack per day

## 2017-03-03 NOTE — MR AVS SNAPSHOT
"        Luis Alfredo Casimiro Javed   3/3/2017 9:50 AM   Office Visit   MRN: [id number]    [facility]   Dept Phone:  [phone]    Description:  Male : 1980   Provider:  Nicho Tovar M.D.           Reason for Visit     Diabetes           Allergies as of 3/3/2017     No Known Allergies      You were diagnosed with     Alcohol abuse, episodic   [305.02.ICD-9-CM]       Morbid obesity due to excess calories (CMS-HCC)   [9745549]       Diabetes mellitus type 2 in obese (CMS-HCC)   [434543]         Vital Signs     Blood Pressure Pulse Temperature Respirations Height Weight    140/92 mmHg 84 36.3 °C (97.4 °F) 16 1.88 m (6' 2.02\") 166.017 kg (366 lb)    Body Mass Index Oxygen Saturation Smoking Status             46.97 kg/m2 95% Current Every Day Smoker         Basic Information     Date Of Birth Sex Race Ethnicity Preferred Language    1980 Male White Non- English      Your appointments     Mar 31, 2017 10:10 AM   Established Patient with Nicho Tovar M.D.   [facility] ([facility])    [address] [place] [id number]   [phone]           You will be receiving a confirmation call a few days before your appointment from our automated call confirmation system.              Problem List              ICD-10-CM Priority Class Noted - Resolved    HTN (hypertension) I10   12/10/2014 - Present    Chest pain R07.9   12/10/2014 - Present    Diabetes mellitus type 2 in obese (CMS-[facility]) E11.9, E66.9   2014 - Present    Hypertriglyceridemia E78.1   2015 - Present    GERD (gastroesophageal reflux disease) K21.9   2015 - Present    ALCOHOL ABUSE, EPISODIC    2015 - Present    Tobacco abuse counseling Z71.6   2015 - Present    Morbid obesity (CMS-HCC) E66.01   2015 - Present    Anxiety F41.9   2016 - Present    Sleep apnea in adult G47.33   2016 - Present    Otalgia of both ears H92.03   2016 - Present    Left sided sciatica M54.32   " 1/3/2017 - Present    Cellulitis of extremity L03.119   1/3/2017 - Present    Alcohol abuse, episodic F10.10   1/19/2017 - Present    Acute right flank pain R10.9   1/19/2017 - Present      Health Maintenance        Date Due Completion Dates    IMM HEP B VACCINE (1 of 3 - Primary Series) 1980 ---    IMM DTaP/Tdap/Td Vaccine (1 - Tdap) 5/6/1997 ---    IMM PNEUMOCOCCAL 19-64 (ADULT) MEDIUM RISK SERIES (1 of 1 - PPSV23) 5/6/1997 ---    IMM INFLUENZA (1) 9/1/2016 ---    A1C SCREENING 2/16/2017 8/16/2016, 1/5/2016, 8/22/2015, 5/6/2015, 12/31/2014    URINE ACR / MICROALBUMIN 3/9/2017 3/9/2016, 5/6/2015, 12/31/2014    FASTING LIPID PROFILE 8/16/2017 8/16/2016, 5/6/2015, 12/31/2014    RETINAL SCREENING 10/31/2017 10/31/2016    SERUM CREATININE 1/16/2018 1/16/2017, 1/5/2016, 8/22/2015, 3/2/2015, 12/10/2014, 5/26/2009    DIABETES MONOFILAMENT / LE EXAM 1/19/2018 1/19/2017, 12/10/2015 (Done)    Override on 12/10/2015: Done            Current Immunizations     No immunizations on file.      Below and/or attached are the medications your provider expects you to take. Review all of your home medications and newly ordered medications with your provider and/or pharmacist. Follow medication instructions as directed by your provider and/or pharmacist. Please keep your medication list with you and share with your provider. Update the information when medications are discontinued, doses are changed, or new medications (including over-the-counter products) are added; and carry medication information at all times in the event of emergency situations     Allergies:  No Known Allergies          Medications  Valid as of: March 03, 2017 - 10:26 AM    Generic Name Brand Name Tablet Size Instructions for use    Aspirin (Tablet Delayed Response) ECOTRIN 81 MG Take 81 mg by mouth every day.        Blood Glucose Monitoring Suppl (Misc) Blood Glucose Monitoring Suppl SUPPLIES Test strips order: Test strips for True Test meter. Sig: use 2x and  prn  Diagnosis: Type 2 diabetes,, obese, uncontrolled        Capsaicin (Cream) ZOSTRIX 0.075 % Apply 1 Application to affected area(s) 3 times a day.        Cyclobenzaprine HCl (Tab) FLEXERIL 10 MG Take 1 Tab by mouth 3 times a day as needed.        Gabapentin (Cap) NEURONTIN 300 MG Take 1 Cap by mouth 3 times a day.        Glucose Blood (Strip) TRUETEST TEST  USE TO TEST 2 TIMES A DAY        Lisinopril (Tab) PRINIVIL 10 MG Take 1 Tab by mouth every day.        MetFORMIN HCl (Tab) GLUCOPHAGE 500 MG TAKE 2 TABS BY MOUTH 2 TIMES A DAY, WITH MEALS.        Metoprolol Succinate (TABLET SR 24 HR) TOPROL XL 50 MG Take 1 Tab by mouth every day.        RaNITidine HCl (Tab) ZANTAC 300 MG TAKE 1 TAB BY MOUTH EVERY DAY.        Sertraline HCl (Tab) ZOLOFT 100 MG Take 1 Tab by mouth every day.        Simvastatin (Tab) ZOCOR 10 MG Take 1 Tab by mouth every evening.        .                 Medicines prescribed today were sent to:     [facility]Y #[id number] - [facility] [address]    [address] [address]    Phone: [phone] Fax: [phone]    Open 24 Hours?: No      Medication refill instructions:       If your prescription bottle indicates you have medication refills left, it is not necessary to call your provider’s office. Please contact your pharmacy and they will refill your medication.    If your prescription bottle indicates you do not have any refills left, you may request refills at any time through one of the following ways: The online [employer] system (except Urgent Care), by calling your provider’s office, or by asking your pharmacy to contact your provider’s office with a refill request. Medication refills are processed only during regular business hours and may not be available until the next business day. Your provider may request additional information or to have a follow-up visit with you prior to refilling your medication.   *Please Note: Medication refills are assigned a new Rx  number when refilled electronically. Your pharmacy may indicate that no refills were authorized even though a new prescription for the same medication is available at the pharmacy. Please request the medicine by name with the pharmacy before contacting your provider for a refill.        Your To Do List     Future Labs/Procedures Complete By Expires    HEMOGLOBIN A1C  As directed 3/4/2018      Other Notes About Your Plan     Fall risk done 5/28/15           MyChart Access Code: Activation code not generated  Current [employer] Status: Active

## 2017-03-03 NOTE — ASSESSMENT & PLAN NOTE
"Patient reports a recent increase in the frequency of heavy drinking (described as 5-12 shots of hard liquor per night). Patient ports that he has noticed that he is starting to drink more frequently following a stressful day. He does continue to work. He is considering avoiding alcohol for at least 30 days and reassessing his use of alcohol. He did drink heavily last night and notes mild headache and fatigue today suggestive of a \"hangover\". He denies any emesis or recent diarrhea.  "

## 2017-03-03 NOTE — ASSESSMENT & PLAN NOTE
Current weight is up 8 pounds. Patient reports that he has recently not been pursuing consistently smaller portions to achieve gradual weight loss. He is not reporting any hair loss or cold intolerance. Notes increased stress eating recently.

## 2017-03-13 RX ORDER — METOPROLOL SUCCINATE 50 MG/1
TABLET, EXTENDED RELEASE ORAL
Qty: 30 TAB | Refills: 9 | Status: SHIPPED | OUTPATIENT
Start: 2017-03-13 | End: 2017-09-07 | Stop reason: SDUPTHER

## 2017-03-24 RX ORDER — GABAPENTIN 300 MG/1
CAPSULE ORAL
Qty: 90 CAP | Refills: 6 | Status: SHIPPED | OUTPATIENT
Start: 2017-03-24 | End: 2017-09-07 | Stop reason: SDUPTHER

## 2017-03-28 ENCOUNTER — HOSPITAL ENCOUNTER (OUTPATIENT)
Dept: LAB | Facility: MEDICAL CENTER | Age: 37
End: 2017-03-28
Attending: FAMILY MEDICINE
Payer: MEDICAID

## 2017-03-28 ENCOUNTER — APPOINTMENT (OUTPATIENT)
Dept: RADIOLOGY | Facility: MEDICAL CENTER | Age: 37
End: 2017-03-28
Attending: EMERGENCY MEDICINE
Payer: MEDICAID

## 2017-03-28 ENCOUNTER — HOSPITAL ENCOUNTER (EMERGENCY)
Facility: MEDICAL CENTER | Age: 37
End: 2017-03-28
Attending: EMERGENCY MEDICINE
Payer: MEDICAID

## 2017-03-28 VITALS
TEMPERATURE: 97.5 F | SYSTOLIC BLOOD PRESSURE: 146 MMHG | RESPIRATION RATE: 16 BRPM | WEIGHT: 315 LBS | BODY MASS INDEX: 40.43 KG/M2 | HEIGHT: 74 IN | DIASTOLIC BLOOD PRESSURE: 90 MMHG | HEART RATE: 85 BPM | OXYGEN SATURATION: 95 %

## 2017-03-28 DIAGNOSIS — E66.9 DIABETES MELLITUS TYPE 2 IN OBESE (HCC): ICD-10-CM

## 2017-03-28 DIAGNOSIS — S46.911A RIGHT SHOULDER STRAIN, INITIAL ENCOUNTER: ICD-10-CM

## 2017-03-28 DIAGNOSIS — E11.69 DIABETES MELLITUS TYPE 2 IN OBESE (HCC): ICD-10-CM

## 2017-03-28 DIAGNOSIS — S46.811A STRAIN OF DELTOID MUSCLE, RIGHT, INITIAL ENCOUNTER: ICD-10-CM

## 2017-03-28 LAB
EST. AVERAGE GLUCOSE BLD GHB EST-MCNC: 140 MG/DL
HBA1C MFR BLD: 6.5 % (ref 0–5.6)

## 2017-03-28 PROCEDURE — 83036 HEMOGLOBIN GLYCOSYLATED A1C: CPT

## 2017-03-28 PROCEDURE — 73030 X-RAY EXAM OF SHOULDER: CPT | Mod: RT

## 2017-03-28 PROCEDURE — 36415 COLL VENOUS BLD VENIPUNCTURE: CPT

## 2017-03-28 PROCEDURE — 99284 EMERGENCY DEPT VISIT MOD MDM: CPT

## 2017-03-28 RX ORDER — HYDROCODONE BITARTRATE AND ACETAMINOPHEN 5; 325 MG/1; MG/1
1-2 TABLET ORAL EVERY 8 HOURS PRN
Qty: 20 TAB | Refills: 0 | Status: SHIPPED | OUTPATIENT
Start: 2017-03-28 | End: 2017-09-03

## 2017-03-28 RX ORDER — CYCLOBENZAPRINE HCL 10 MG
10 TABLET ORAL 3 TIMES DAILY PRN
Qty: 30 TAB | Refills: 0 | Status: SHIPPED | OUTPATIENT
Start: 2017-03-28 | End: 2017-09-03

## 2017-03-28 RX ORDER — CHLORAL HYDRATE 500 MG
1000 CAPSULE ORAL DAILY
Status: SHIPPED | COMMUNITY
End: 2019-01-09 | Stop reason: CLARIF

## 2017-03-28 ASSESSMENT — PAIN SCALES - GENERAL: PAINLEVEL_OUTOF10: 8

## 2017-03-28 NOTE — DISCHARGE INSTRUCTIONS
Muscle Strain  A muscle strain is an injury that occurs when a muscle is stretched beyond its normal length. Usually a small number of muscle fibers are torn when this happens. Muscle strain is rated in degrees. First-degree strains have the least amount of muscle fiber tearing and pain. Second-degree and third-degree strains have increasingly more tearing and pain.   Usually, recovery from muscle strain takes 1-2 weeks. Complete healing takes 5-6 weeks.   CAUSES   Muscle strain happens when a sudden, violent force placed on a muscle stretches it too far. This may occur with lifting, sports, or a fall.   RISK FACTORS  Muscle strain is especially common in athletes.   SIGNS AND SYMPTOMS  At the site of the muscle strain, there may be:  · Pain.  · Bruising.  · Swelling.  · Difficulty using the muscle due to pain or lack of normal function.  DIAGNOSIS   Your health care provider will perform a physical exam and ask about your medical history.  TREATMENT   Often, the best treatment for a muscle strain is resting, icing, and applying cold compresses to the injured area.    HOME CARE INSTRUCTIONS   · Use the PRICE method of treatment to promote muscle healing during the first 2-3 days after your injury. The PRICE method involves:  ¨ Protecting the muscle from being injured again.  ¨ Restricting your activity and resting the injured body part.  ¨ Icing your injury. To do this, put ice in a plastic bag. Place a towel between your skin and the bag. Then, apply the ice and leave it on from 15-20 minutes each hour. After the third day, switch to moist heat packs.  ¨ Apply compression to the injured area with a splint or elastic bandage. Be careful not to wrap it too tightly. This may interfere with blood circulation or increase swelling.  ¨ Elevate the injured body part above the level of your heart as often as you can.  · Only take over-the-counter or prescription medicines for pain, discomfort, or fever as directed by your  health care provider.  · Warming up prior to exercise helps to prevent future muscle strains.  SEEK MEDICAL CARE IF:   · You have increasing pain or swelling in the injured area.  · You have numbness, tingling, or a significant loss of strength in the injured area.  MAKE SURE YOU:   · Understand these instructions.  · Will watch your condition.  · Will get help right away if you are not doing well or get worse.     This information is not intended to replace advice given to you by your health care provider. Make sure you discuss any questions you have with your health care provider.     Document Released: 12/18/2006 Document Revised: 10/08/2014 Document Reviewed: 07/17/2014  [employer] Patient Education ©2016 [employer]

## 2017-03-28 NOTE — ED AVS SNAPSHOT
3/28/2017          Luis Alfredo Javed  [address] [place] [address]    Dear Luis Alfredo:    [facility] wants to ensure your discharge home is safe and you or your loved ones have had all your questions answered regarding your care after you leave the hospital.    You may receive a telephone call within two days of your discharge.  This call is to make certain you understand your discharge instructions as well as ensure we provided you with the best care possible during your stay with us.     The call will only last approximately 3-5 minutes and will be done by a nurse.    Once again, we want to ensure your discharge home is safe and that you have a clear understanding of any next steps in your care.  If you have any questions or concerns, please do not hesitate to contact us, we are here for you.  Thank you for choosing [facility] for your healthcare needs.    Sincerely,    Rao Lafleur    [facility]

## 2017-03-28 NOTE — ED NOTES
Pt discharged with written instructions. Pt verbalized understanding. Pt's AAOx4. Pt with sling in place. Pt ambulated independently from ER.

## 2017-03-28 NOTE — ED AVS SNAPSHOT
Home Care Instructions                                                                                                                Luis Alfredo Javed   MRN: [id number]    Department:  [facility], Emergency Dept   Date of Visit:  3/28/2017            [facility], Emergency Dept    [address]    [place] [address]    Phone:  [phone]      You were seen by     Jesus Warner D.O.      Your Diagnosis Was     Right shoulder strain, initial encounter     S46.465K       Medication Information     Review all of your home medications and newly ordered medications with your primary doctor and/or pharmacist as soon as possible. Follow medication instructions as directed by your doctor and/or pharmacist.     Please keep your complete medication list with you and share with your physician. Update the information when medications are discontinued, doses are changed, or new medications (including over-the-counter products) are added; and carry medication information at all times in the event of emergency situations.               Medication List      START taking these medications        Instructions    Morning Afternoon Evening Bedtime    cyclobenzaprine 10 MG Tabs   Commonly known as:  FLEXERIL        Take 1 Tab by mouth 3 times a day as needed for Moderate Pain or Muscle Spasms.   Dose:  10 mg                        hydrocodone-acetaminophen 5-325 MG Tabs per tablet   Commonly known as:  NORCO        Take 1-2 Tabs by mouth every 8 hours as needed (pain).   Dose:  1-2 Tab                          ASK your doctor about these medications        Instructions    Morning Afternoon Evening Bedtime    aspirin EC 81 MG Tbec   Commonly known as:  ECOTRIN        Take 81 mg by mouth every day.   Dose:  81 mg                        fish oil 1000 MG Caps capsule        Take 1,000 mg by mouth every day.   Dose:  1000 mg                        gabapentin 300 MG Caps   Commonly  known as:  NEURONTIN        TAKE 1 CAP BY MOUTH 3 TIMES A DAY.                        lisinopril 10 MG Tabs   Commonly known as:  PRINIVIL        Take 1 Tab by mouth every day.   Dose:  10 mg                        metformin 500 MG Tabs   Commonly known as:  GLUCOPHAGE        TAKE 2 TABS BY MOUTH 2 TIMES A DAY, WITH MEALS.                        metoprolol SR 50 MG Tb24   Commonly known as:  TOPROL XL        TAKE 1 TAB BY MOUTH EVERY DAY.                        multivitamin Tabs        Take 1 Tab by mouth every day.   Dose:  1 Tab                        POTASSIUM PO        Take 1 Tab by mouth every day. Indications: OTC   Dose:  1 Tab                        ranitidine 300 MG tablet   Commonly known as:  ZANTAC        TAKE 1 TAB BY MOUTH EVERY DAY.                        sertraline 100 MG Tabs   Commonly known as:  ZOLOFT        Take 1 Tab by mouth every day.   Dose:  100 mg                        simvastatin 10 MG Tabs   Commonly known as:  ZOCOR        Take 1 Tab by mouth every evening.   Dose:  10 mg                        SUPER B COMPLEX PO        Take 1 Tab by mouth every day.   Dose:  1 Tab                             Where to Get Your Medications      You can get these medications from any pharmacy     Bring a paper prescription for each of these medications    - cyclobenzaprine 10 MG Tabs  - hydrocodone-acetaminophen 5-325 MG Tabs per tablet            Procedures and tests performed during your visit     DX-SHOULDER 2+ RIGHT        Discharge Instructions       Muscle Strain  A muscle strain is an injury that occurs when a muscle is stretched beyond its normal length. Usually a small number of muscle fibers are torn when this happens. Muscle strain is rated in degrees. First-degree strains have the least amount of muscle fiber tearing and pain. Second-degree and third-degree strains have increasingly more tearing and pain.   Usually, recovery from muscle strain takes 1-2 weeks. Complete healing takes 5-6  weeks.   CAUSES   Muscle strain happens when a sudden, violent force placed on a muscle stretches it too far. This may occur with lifting, sports, or a fall.   RISK FACTORS  Muscle strain is especially common in athletes.   SIGNS AND SYMPTOMS  At the site of the muscle strain, there may be:  · Pain.  · Bruising.  · Swelling.  · Difficulty using the muscle due to pain or lack of normal function.  DIAGNOSIS   Your health care provider will perform a physical exam and ask about your medical history.  TREATMENT   Often, the best treatment for a muscle strain is resting, icing, and applying cold compresses to the injured area.    HOME CARE INSTRUCTIONS   · Use the PRICE method of treatment to promote muscle healing during the first 2-3 days after your injury. The PRICE method involves:  ¨ Protecting the muscle from being injured again.  ¨ Restricting your activity and resting the injured body part.  ¨ Icing your injury. To do this, put ice in a plastic bag. Place a towel between your skin and the bag. Then, apply the ice and leave it on from 15-20 minutes each hour. After the third day, switch to moist heat packs.  ¨ Apply compression to the injured area with a splint or elastic bandage. Be careful not to wrap it too tightly. This may interfere with blood circulation or increase swelling.  ¨ Elevate the injured body part above the level of your heart as often as you can.  · Only take over-the-counter or prescription medicines for pain, discomfort, or fever as directed by your health care provider.  · Warming up prior to exercise helps to prevent future muscle strains.  SEEK MEDICAL CARE IF:   · You have increasing pain or swelling in the injured area.  · You have numbness, tingling, or a significant loss of strength in the injured area.  MAKE SURE YOU:   · Understand these instructions.  · Will watch your condition.  · Will get help right away if you are not doing well or get worse.     This information is not intended  to replace advice given to you by your health care provider. Make sure you discuss any questions you have with your health care provider.     Document Released: 12/18/2006 Document Revised: 10/08/2014 Document Reviewed: 07/17/2014  Elsevier Interactive Patient Education ©2016 [employer]            Patient Information     Patient Information    Following emergency treatment: all patient requiring follow-up care must return either to a private physician or a clinic if your condition worsens before you are able to obtain further medical attention, please return to the emergency room.     Billing Information    At [facility], we work to make the billing process streamlined for our patients.  Our Representatives are here to answer any questions you may have regarding your hospital bill.  If you have insurance coverage and have supplied your insurance information to us, we will submit a claim to your insurer on your behalf.  Should you have any questions regarding your bill, we can be reached online or by phone as follows:  Online: You are able pay your bills online or live chat with our representatives about any billing questions you may have. We are here to help Monday - Friday from 8:00am to 7:30pm and 9:00am - 12:00pm on Saturdays.  Please visit [email or web address]  for more information.   Phone:  [phone] or [phone]    Please note that your emergency physician, surgeon, pathologist, radiologist, anesthesiologist, and other specialists are not employed by [facility] and will therefore bill separately for their services.  Please contact them directly for any questions concerning their bills at the numbers below:     Emergency Physician Services:  [phone]  [facility]:  [phone]  [facility]:  [phone]  [facility]:  [phone]    1. Your final bill may vary from the amount quoted upon discharge if all  procedures are not complete at that time, or if your doctor has additional procedures of which we are not aware. You will receive an additional bill if you return to the Emergency Department at [facility] for suture removal regardless of the facility of which the sutures were placed.     2. Please arrange for settlement of this account at the emergency registration.    3. All self-pay accounts are due in full at the time of treatment.  If you are unable to meet this obligation then payment is expected within 4-5 days.     4. If you have had radiology studies (CT, X-ray, Ultrasound, MRI), you have received a preliminary result during your emergency department visit. Please contact the radiology department [phone] to receive a copy of your final result. Please discuss the Final result with your primary physician or with the follow up physician provided.     Crisis Hotline:  [place] Crisis Hotline:  0-628-NNNDNXZ or 1-815.264.8293  [place] Crisis Hotline:    1-529.181.9580 or 551-757-2379         ED Discharge Follow Up Questions    1. In order to provide you with very good care, we would like to follow up with a phone call in the next few days.  May we have your permission to contact you?     YES /  NO    2. What is the best phone number to call you? (       )_____-__________    3. What is the best time to call you?      Morning  /  Afternoon  /  Evening                   Patient Signature:  ____________________________________________________________    Date:  ____________________________________________________________      Your appointments     Mar 31, 2017 10:10 AM   Established Patient with Nicho Tovar M.D.   [facility] ([facility])    [address] [place] [id number]   [phone]           You will be receiving a confirmation call a few days before your appointment from our automated call confirmation system.

## 2017-03-28 NOTE — ED AVS SNAPSHOT
[employer] Access Code: Activation code not generated  Current [employer] Status: Active    "[email or web address]hart  A secure, online tool to manage your health information     [employer]’s [employer]® is a secure, online tool that connects you to your personalized health information from the privacy of your home -- day or night - making it very easy for you to manage your healthcare. Once the activation process is completed, you can even access your medical information using the [employer] yovana, which is available for free in the Apple Yovana store or Google Play store.     [employer] provides the following levels of access (as shown below):   My Chart Features   [facility] Primary Care Doctor [facility]  Specialists [facility]  Urgent  Care Non-[facility]  Primary Care  Doctor   Email your healthcare team securely and privately 24/7 X X X X   Manage appointments: schedule your next appointment; view details of past/upcoming appointments X      Request prescription refills. X      View recent personal medical records, including lab and immunizations X X X X   View health record, including health history, allergies, medications X X X X   Read reports about your outpatient visits, procedures, consult and ER notes X X X X   See your discharge summary, which is a recap of your hospital and/or ER visit that includes your diagnosis, lab results, and care plan. X X       How to register for [employer]:  1. Go to  [email or web address].  2. Click on the Sign Up Now box, which takes you to the New Member Sign Up page. You will need to provide the following information:  a. Enter your [employer] Access Code exactly as it appears at the top of this page. (You will not need to use this code after you’ve completed the sign-up process. If you do not sign up before the expiration date, you must request a new code.)   b. Enter your date of birth.   c. Enter your home email address.   d. Click Submit, and follow the next screen’s instructions.  3. Create a [employer] ID. This will  be your [employer] login ID and cannot be changed, so think of one that is secure and easy to remember.  4. Create a [employer] password. You can change your password at any time.  5. Enter your Password Reset Question and Answer. This can be used at a later time if you forget your password.   6. Enter your e-mail address. This allows you to receive e-mail notifications when new information is available in [employer].  7. Click Sign Up. You can now view your health information.    For assistance activating your [employer] account, call [phone]

## 2017-03-29 NOTE — ED PROVIDER NOTES
ED Provider Note    CHIEF COMPLAINT   Chief Complaint   Patient presents with   • Shoulder Injury       HPI   Luis Alfredo Casimiro Javed is a 36 y.o. male who presents to the emergency room today with complaints of right shoulder pain after lifting weights. She states 5 days ago he was lifting weights and felt some pain over his right shoulder pain is persisted prompting the come to the emergency room today appears over his right upper shoulder radiates slightly down his arm. No swelling. No numbness or tingling no neck or chest pain. No previous injury to the area.    REVIEW OF SYSTEMS   See HPI for further details. All other systems are negative.     PAST MEDICAL HISTORY   Past Medical History   Diagnosis Date   • Hypertension    • Chest pain      x 5 years.   • Type II or unspecified type diabetes mellitus without mention of complication, not stated as uncontrolled        FAMILY HISTORY  Family History   Problem Relation Age of Onset   • Diabetes Mother    • Stroke Father    • Alcohol/Drug Maternal Grandfather    • Diabetes Paternal Grandfather    • Cancer Paternal Grandfather      lung   • Heart Disease Neg Hx        SOCIAL HISTORY  Social History     Social History   • Marital Status:      Spouse Name: N/A   • Number of Children: N/A   • Years of Education: N/A     Social History Main Topics   • Smoking status: Current Every Day Smoker -- 1.50 packs/day for 21 years     Types: Cigarettes   • Smokeless tobacco: Never Used   • Alcohol Use: 0.0 oz/week     0 Standard drinks or equivalent per week      Comment: 3 to 4 times weekly   • Drug Use: No      Comment: quit IV meth 2005   • Sexual Activity: Not on file     Other Topics Concern   • Not on file     Social History Narrative       SURGICAL HISTORY  Past Surgical History   Procedure Laterality Date   • Hernia repair  age 4   • Strabismus repair  age 5       CURRENT MEDICATIONS   Home Medications     Reviewed by Viki Perez ([facility]) on 03/28/17  "at 1017  Med List Status: Complete    Medication Last Dose Status    aspirin EC (ECOTRIN) 81 MG TBEC 3/28/2017 Active    B Complex-C (SUPER B COMPLEX PO) 3/28/2017 Active    gabapentin (NEURONTIN) 300 MG Cap 3/28/2017 Active    lisinopril (PRINIVIL) 10 MG Tab 3/28/2017 Active    metformin (GLUCOPHAGE) 500 MG Tab 3/28/2017 Active    metoprolol SR (TOPROL XL) 50 MG TABLET SR 24 HR 3/28/2017 Active    multivitamin (THERAGRAN) Tab 3/28/2017 Active    Omega-3 Fatty Acids (FISH OIL) 1000 MG Cap capsule 3/28/2017 Active    POTASSIUM PO 3/28/2017 Active    ranitidine (ZANTAC) 300 MG tablet 3/28/2017 Active    sertraline (ZOLOFT) 100 MG Tab 3/28/2017 Active    simvastatin (ZOCOR) 10 MG Tab 3/27/2017 Active                ALLERGIES   No Known Allergies    PHYSICAL EXAM  VITAL SIGNS: /90 mmHg  Pulse 85  Temp(Src) 36.4 °C (97.5 °F)  Resp 16  Ht 1.88 m (6' 2\")  Wt 166.8 kg (367 lb 11.6 oz)  BMI 47.19 kg/m2  SpO2 95% Room air O2: 95    Constitutional: Well developed, Well nourished, No acute distress, Non-toxic appearance.   Cardiovascular: Normal heart rate, Normal rhythm, No murmurs, No rubs, No gallops.   Thorax & Lungs: Normal breath sounds, No respiratory distress, No wheezing, No chest tenderness.   Abdomen: Bowel sounds normal, Soft, No tenderness, No masses, No pulsatile masses.   Skin: Warm, Dry, No erythema, No rash.   Extremities: Intact distal pulses, No cyanosis, No clubbing.   Musculoskeletal: Tenderness over the deltoid muscle and worse with direct palpation or abduction. Pulses sensation intact distally and no gross deformity no signs of swelling or infection noted.   Neurologic: Alert & oriented x 3, Normal motor function, Normal sensory function, No focal deficits noted.     RADIOLOGY/PROCEDURES  DX-SHOULDER 2+ RIGHT   Final Result      Negative shoulder series aside from mild inferior distal clavicular spurring.            COURSE & MEDICAL DECISION MAKING  Pertinent Labs & Imaging studies reviewed. " (See chart for details)  Patient advised ice, elevation rest patient absolutely. Placed on Norco/Flexeril advised anti-inflammatory. Follow up with primary care physician return if persistent worsening symptoms patient discharged above the home he verbalized understanding instructions to try wrapping machine and medication.    FINAL IMPRESSION  1. Acute right shoulder strain/deltoid muscle strain  2.   3.      Electronically signed by: Jesus Warner, 3/28/2017 7:16 PM

## 2017-03-31 ENCOUNTER — OFFICE VISIT (OUTPATIENT)
Dept: MEDICAL GROUP | Facility: MEDICAL CENTER | Age: 37
End: 2017-03-31
Attending: FAMILY MEDICINE
Payer: MEDICAID

## 2017-03-31 VITALS
OXYGEN SATURATION: 92 % | HEIGHT: 74 IN | BODY MASS INDEX: 40.43 KG/M2 | SYSTOLIC BLOOD PRESSURE: 116 MMHG | DIASTOLIC BLOOD PRESSURE: 76 MMHG | RESPIRATION RATE: 16 BRPM | TEMPERATURE: 97.9 F | WEIGHT: 315 LBS | HEART RATE: 108 BPM

## 2017-03-31 DIAGNOSIS — F10.10 ALCOHOL ABUSE, EPISODIC: ICD-10-CM

## 2017-03-31 DIAGNOSIS — E11.69 DIABETES MELLITUS TYPE 2 IN OBESE (HCC): ICD-10-CM

## 2017-03-31 DIAGNOSIS — E66.9 DIABETES MELLITUS TYPE 2 IN OBESE (HCC): ICD-10-CM

## 2017-03-31 PROCEDURE — 99213 OFFICE O/P EST LOW 20 MIN: CPT | Performed by: FAMILY MEDICINE

## 2017-03-31 ASSESSMENT — PATIENT HEALTH QUESTIONNAIRE - PHQ9: CLINICAL INTERPRETATION OF PHQ2 SCORE: 0

## 2017-03-31 NOTE — MR AVS SNAPSHOT
"Luis Alfredo Casimiro Tegan   3/31/2017 10:10 AM   Office Visit   MRN: [id number]    [facility]   Dept Phone:  [phone]    Description:  Male : 1980   Provider:  Nicho Tovar M.D.           Reason for Visit     Follow-Up           Allergies as of 3/31/2017     No Known Allergies      You were diagnosed with     Diabetes mellitus type 2 in obese (CMS-HCC)   [926957]       Alcohol abuse, episodic   [305.02.ICD-9-CM]         Vital Signs     Blood Pressure Pulse Temperature Respirations Height Weight    116/76 mmHg 108 36.6 °C (97.9 °F) 16 1.88 m (6' 2.02\") 163.295 kg (360 lb)    Body Mass Index Oxygen Saturation Smoking Status             46.20 kg/m2 92% Current Every Day Smoker         Basic Information     Date Of Birth Sex Race Ethnicity Preferred Language    1980 Male White Non- English      Problem List              ICD-10-CM Priority Class Noted - Resolved    HTN (hypertension) I10   12/10/2014 - Present    Chest pain R07.9   12/10/2014 - Present    Diabetes mellitus type 2 in obese (CMS-HCC) E11.9, E66.9   2014 - Present    Hypertriglyceridemia E78.1   2015 - Present    GERD (gastroesophageal reflux disease) K21.9   2015 - Present    ALCOHOL ABUSE, EPISODIC    2015 - Present    Tobacco abuse counseling Z71.6   2015 - Present    Morbid obesity (CMS-HCC) E66.01   2015 - Present    Anxiety F41.9   2016 - Present    Sleep apnea in adult G47.33   2016 - Present    Otalgia of both ears H92.03   2016 - Present    Left sided sciatica M54.32   1/3/2017 - Present    Cellulitis of extremity L03.119   1/3/2017 - Present    Alcohol abuse, episodic F10.10   2017 - Present    Acute right flank pain R10.9   2017 - Present      Health Maintenance        Date Due Completion Dates    IMM HEP B VACCINE (1 of 3 - Primary Series) 1978 ---    IMM DTaP/Tdap/Td Vaccine (1 - Tdap) 1997 ---    IMM PNEUMOCOCCAL 19-64 (ADULT) MEDIUM " RISK SERIES (1 of 1 - PPSV23) 5/6/1997 ---    IMM INFLUENZA (1) 9/1/2016 ---    URINE ACR / MICROALBUMIN 3/9/2017 3/9/2016, 5/6/2015, 12/31/2014    FASTING LIPID PROFILE 8/16/2017 8/16/2016, 5/6/2015, 12/31/2014    A1C SCREENING 9/28/2017 3/28/2017, 8/16/2016, 1/5/2016, 8/22/2015, 5/6/2015, 12/31/2014    RETINAL SCREENING 10/31/2017 10/31/2016    SERUM CREATININE 1/16/2018 1/16/2017, 1/5/2016, 8/22/2015, 3/2/2015, 12/10/2014, 5/26/2009    DIABETES MONOFILAMENT / LE EXAM 1/19/2018 1/19/2017, 12/10/2015 (Done)    Override on 12/10/2015: Done            Current Immunizations     No immunizations on file.      Below and/or attached are the medications your provider expects you to take. Review all of your home medications and newly ordered medications with your provider and/or pharmacist. Follow medication instructions as directed by your provider and/or pharmacist. Please keep your medication list with you and share with your provider. Update the information when medications are discontinued, doses are changed, or new medications (including over-the-counter products) are added; and carry medication information at all times in the event of emergency situations     Allergies:  No Known Allergies          Medications  Valid as of: March 31, 2017 - 10:52 AM    Generic Name Brand Name Tablet Size Instructions for use    Aspirin (Tablet Delayed Response) ECOTRIN 81 MG Take 81 mg by mouth every day.        B Complex-C   Take 1 Tab by mouth every day.        Cyclobenzaprine HCl (Tab) FLEXERIL 10 MG Take 1 Tab by mouth 3 times a day as needed for Moderate Pain or Muscle Spasms.        Gabapentin (Cap) NEURONTIN 300 MG TAKE 1 CAP BY MOUTH 3 TIMES A DAY.        Hydrocodone-Acetaminophen (Tab) NORCO 5-325 MG Take 1-2 Tabs by mouth every 8 hours as needed (pain).        Lisinopril (Tab) PRINIVIL 10 MG Take 1 Tab by mouth every day.        MetFORMIN HCl (Tab) GLUCOPHAGE 500 MG TAKE 2 TABS BY MOUTH 2 TIMES A DAY, WITH MEALS.         Metoprolol Succinate (TABLET SR 24 HR) TOPROL XL 50 MG TAKE 1 TAB BY MOUTH EVERY DAY.        Multiple Vitamin (Tab) THERAGRAN  Take 1 Tab by mouth every day.        Omega-3 Fatty Acids (Cap) fish oil 1000 MG Take 1,000 mg by mouth every day.        Potassium   Take 1 Tab by mouth every day. Indications: OTC        RaNITidine HCl (Tab) ZANTAC 300 MG TAKE 1 TAB BY MOUTH EVERY DAY.        Sertraline HCl (Tab) ZOLOFT 100 MG Take 1 Tab by mouth every day.        Simvastatin (Tab) ZOCOR 10 MG Take 1 Tab by mouth every evening.        .                 Medicines prescribed today were sent to:     [facility]/PHARMACY #[id number] - [place], [place] - [address]    [address] [address]    Phone: [phone] Fax: [phone]    Open 24 Hours?: No      Medication refill instructions:       If your prescription bottle indicates you have medication refills left, it is not necessary to call your provider’s office. Please contact your pharmacy and they will refill your medication.    If your prescription bottle indicates you do not have any refills left, you may request refills at any time through one of the following ways: The online [facility] system (except Urgent Care), by calling your provider’s office, or by asking your pharmacy to contact your provider’s office with a refill request. Medication refills are processed only during regular business hours and may not be available until the next business day. Your provider may request additional information or to have a follow-up visit with you prior to refilling your medication.   *Please Note: Medication refills are assigned a new Rx number when refilled electronically. Your pharmacy may indicate that no refills were authorized even though a new prescription for the same medication is available at the pharmacy. Please request the medicine by name with the pharmacy before contacting your provider for a refill.        Your To Do List     Future Labs/Procedures  Complete By Expires    MICROALBUMIN CREAT RATIO URINE  As directed 4/1/2018      Other Notes About Your Plan     Fall risk done 5/28/15           MyChart Access Code: Activation code not generated  Current MyChart Status: Active          Quit Tobacco Information     Do you want to quit using tobacco?    Quitting tobacco decreases risks of cancer, heart and lung disease, increases life expectancy, improves sense of taste and smell, and increases spending money, among other benefits.    If you are thinking about quitting, we can help.  • [facility] Quit Tobacco Program: [phone]  o Program occurs weekly for four weeks and includes pharmacist consultation on products to support quitting smoking or chewing tobacco. A provider referral is needed for pharmacist consultation.  • Tobacco Users Help Hotline: 8-499-QUITNOW (660-3611) or https://[place].quitlogix.org/  o Free, confidential telephone and online coaching for [place] residents. Sessions are designed on a schedule that is convenient for you. Eligible clients receive free nicotine replacement therapy.  • Nationally: www.smokefree.gov  o Information and professional assistance to support both immediate and long-term needs as you become, and remain, a non-smoker. Smokefree.gov allows you to choose the help that best fits your needs.

## 2017-03-31 NOTE — PROGRESS NOTES
"Subjective:      Luis Alfredo Javed is a 36 y.o. male who presents with Follow-Up            HPI 1. Type 2 diabetes mellitus-recent A1c returned at an excellent level of 6.5. Patient reports morning sugars are typically running right about 120. Denies any polyuria, dysuria, symptomatically low blood sugars recently characterized by headache or diaphoresis  2. Episodic alcohol abuse-patient reports that he is typically drinking heavily perhaps one night a week, always on the weekends. He and his wife will drink most of a bottle of bourbon on that occasion.    ROS negative for tremors, syncope, headache       Objective:     /76 mmHg  Pulse 108  Temp(Src) 36.6 °C (97.9 °F)  Resp 16  Ht 1.88 m (6' 2.02\")  Wt 163.295 kg (360 lb)  BMI 46.20 kg/m2  SpO2 92%     Physical Exam  Gen.- alert, cooperative, in no acute distress  Neck- midline trachea, thyroid not enlarged or tender,supple, no cervical adenopathy  Chest-clear to auscultation and percussion with normal breath sounds. No retractions. Chest wall nontender  Cardiac- regular rhythm and rate. No murmur, thrill, or heave            Assessment/Plan:     1. Diabetes mellitus type 2 in obese (CMS-HCC)    - MICROALBUMIN CREAT RATIO URINE; Future    2. Alcohol abuse, episodic    Plan: 1. Patient is encouraged to further decrease intermittent intense alcohol exposure  2. Patient is encouraged to reduce daily tobacco use  3. Revisit in 3 months        "

## 2017-04-17 RX ORDER — LISINOPRIL 10 MG/1
TABLET ORAL
Qty: 30 TAB | Refills: 10 | Status: SHIPPED | OUTPATIENT
Start: 2017-04-17 | End: 2018-04-26 | Stop reason: SDUPTHER

## 2017-05-08 RX ORDER — BUPROPION HYDROCHLORIDE 100 MG/1
TABLET, EXTENDED RELEASE ORAL
Qty: 60 TAB | Refills: 6 | Status: SHIPPED | OUTPATIENT
Start: 2017-05-08 | End: 2017-09-03

## 2017-06-16 RX ORDER — SERTRALINE HYDROCHLORIDE 100 MG/1
TABLET, FILM COATED ORAL
Qty: 30 TAB | Refills: 1 | Status: SHIPPED | OUTPATIENT
Start: 2017-06-16 | End: 2017-06-30 | Stop reason: SDUPTHER

## 2017-06-30 ENCOUNTER — OFFICE VISIT (OUTPATIENT)
Dept: MEDICAL GROUP | Facility: MEDICAL CENTER | Age: 37
End: 2017-06-30
Attending: FAMILY MEDICINE
Payer: MEDICAID

## 2017-06-30 VITALS
BODY MASS INDEX: 40.43 KG/M2 | WEIGHT: 315 LBS | DIASTOLIC BLOOD PRESSURE: 86 MMHG | HEIGHT: 74 IN | RESPIRATION RATE: 20 BRPM | SYSTOLIC BLOOD PRESSURE: 136 MMHG | TEMPERATURE: 97.2 F | HEART RATE: 88 BPM | OXYGEN SATURATION: 91 %

## 2017-06-30 DIAGNOSIS — E11.69 DIABETES MELLITUS TYPE 2 IN OBESE (HCC): ICD-10-CM

## 2017-06-30 DIAGNOSIS — R07.89 ATYPICAL CHEST PAIN: ICD-10-CM

## 2017-06-30 DIAGNOSIS — I10 ESSENTIAL HYPERTENSION: ICD-10-CM

## 2017-06-30 DIAGNOSIS — E66.01 MORBID OBESITY DUE TO EXCESS CALORIES (HCC): ICD-10-CM

## 2017-06-30 DIAGNOSIS — E66.9 DIABETES MELLITUS TYPE 2 IN OBESE (HCC): ICD-10-CM

## 2017-06-30 DIAGNOSIS — Z71.6 TOBACCO ABUSE COUNSELING: ICD-10-CM

## 2017-06-30 DIAGNOSIS — F10.10 ALCOHOL ABUSE, EPISODIC: ICD-10-CM

## 2017-06-30 PROCEDURE — 99214 OFFICE O/P EST MOD 30 MIN: CPT | Performed by: FAMILY MEDICINE

## 2017-06-30 RX ORDER — SERTRALINE HYDROCHLORIDE 100 MG/1
100 TABLET, FILM COATED ORAL
Qty: 30 TAB | Refills: 6 | Status: SHIPPED | OUTPATIENT
Start: 2017-06-30 | End: 2018-05-22 | Stop reason: SDUPTHER

## 2017-06-30 ASSESSMENT — PAIN SCALES - GENERAL: PAINLEVEL: NO PAIN

## 2017-06-30 NOTE — ASSESSMENT & PLAN NOTE
Patient has been compliant taking metformin 1000 mg twice daily. Dietary compliance has waned in the past 3 months due to increased life stresses but patient is coming back on track.  low blood test denies any diaphoretic episodes including headache that would suggest low blood sugar.

## 2017-06-30 NOTE — ASSESSMENT & PLAN NOTE
Patient reports at least weekly episodes of a feeling of abnormal motion or flip-flops in his central chest. Is not really reporting sharp or dull chest pain or defined chest pressure. At times this will occur with exertion but can also occur at rest. Recalls having a unremarkable treadmill approximately 5 years ago for a different category of pain at that time. Patient does smoke about a pack of cigarettes per day. Has diabetes over the past 5 years as well.

## 2017-06-30 NOTE — MR AVS SNAPSHOT
"Luis Alfredo Casimiro Tegan   2017 7:30 AM   Office Visit   MRN: [id number]    Department:  [facility]   Dept Phone:  [phone]    Description:  Male : 1980   Provider:  Nicho Tovar M.D.           Reason for Visit     Follow-Up diabetes      Allergies as of 2017     No Known Allergies      You were diagnosed with     Essential hypertension   [5601594]       Diabetes mellitus type 2 in obese (CMS-[facility])   [470200]       Morbid obesity due to excess calories (CMS-HCC)   [0053421]       Alcohol abuse, episodic   [305.02.ICD-9-CM]       Atypical chest pain   [386229]       Tobacco abuse counseling   [148788]         Vital Signs     Blood Pressure Pulse Temperature Respirations Height Weight    136/86 mmHg 88 36.2 °C (97.2 °F) 20 1.88 m (6' 2.02\") 171.006 kg (377 lb)    Body Mass Index Oxygen Saturation Smoking Status             48.38 kg/m2 91% Current Every Day Smoker         Basic Information     Date Of Birth Sex Race Ethnicity Preferred Language    1980 Male White Non- English      Your appointments     2017  8:10 AM   Established Patient with Nicho Tovar M.D.   [facility] ([facility])    [address] [place] [address] [phone]           You will be receiving a confirmation call a few days before your appointment from our automated call confirmation system.              Problem List              ICD-10-CM Priority Class Noted - Resolved    HTN (hypertension) I10   12/10/2014 - Present    Chest pain R07.9   12/10/2014 - Present    Diabetes mellitus type 2 in obese (CMS-[facility]) E11.9, E66.9   2014 - Present    Hypertriglyceridemia E78.1   2015 - Present    GERD (gastroesophageal reflux disease) K21.9   2015 - Present    ALCOHOL ABUSE, EPISODIC    2015 - Present    Tobacco abuse counseling Z71.6   2015 - Present    Morbid obesity (CMS-HCC) E66.01   2015 - Present    Anxiety F41.9   2016 - Present    Sleep " apnea in adult G47.30   6/6/2016 - Present    Otalgia of both ears H92.03   8/30/2016 - Present    Left sided sciatica M54.32   1/3/2017 - Present    Cellulitis of extremity L03.119   1/3/2017 - Present    Alcohol abuse, episodic F10.10   1/19/2017 - Present    Acute right flank pain R10.9   1/19/2017 - Present    Atypical chest pain R07.89   6/30/2017 - Present      Health Maintenance        Date Due Completion Dates    IMM HEP B VACCINE (1 of 3 - Primary Series) 1978 ---    IMM DTaP/Tdap/Td Vaccine (1 - Tdap) 5/6/1997 ---    IMM PNEUMOCOCCAL 19-64 (ADULT) MEDIUM RISK SERIES (1 of 1 - PPSV23) 5/6/1997 ---    URINE ACR / MICROALBUMIN 3/9/2017 3/9/2016, 5/6/2015, 12/31/2014    FASTING LIPID PROFILE 8/16/2017 8/16/2016, 5/6/2015, 12/31/2014    A1C SCREENING 9/28/2017 3/28/2017, 8/16/2016, 1/5/2016, 8/22/2015, 5/6/2015, 12/31/2014    RETINAL SCREENING 10/31/2017 10/31/2016    SERUM CREATININE 1/16/2018 1/16/2017, 1/5/2016, 8/22/2015, 3/2/2015, 12/10/2014, 5/26/2009    DIABETES MONOFILAMENT / LE EXAM 1/19/2018 1/19/2017, 12/10/2015 (Done)    Override on 12/10/2015: Done            Current Immunizations     No immunizations on file.      Below and/or attached are the medications your provider expects you to take. Review all of your home medications and newly ordered medications with your provider and/or pharmacist. Follow medication instructions as directed by your provider and/or pharmacist. Please keep your medication list with you and share with your provider. Update the information when medications are discontinued, doses are changed, or new medications (including over-the-counter products) are added; and carry medication information at all times in the event of emergency situations     Allergies:  No Known Allergies          Medications  Valid as of: June 30, 2017 -  7:57 AM    Generic Name Brand Name Tablet Size Instructions for use    Aspirin (Tablet Delayed Response) ECOTRIN 81 MG Take 81 mg by mouth every  day.        B Complex-C   Take 1 Tab by mouth every day.        BuPROPion HCl (TABLET SR 12 HR) WELLBUTRIN- MG TAKE 1 TAB BY MOUTH 2 TIMES A DAY.        Cyclobenzaprine HCl (Tab) FLEXERIL 10 MG Take 1 Tab by mouth 3 times a day as needed for Moderate Pain or Muscle Spasms.        Gabapentin (Cap) NEURONTIN 300 MG TAKE 1 CAP BY MOUTH 3 TIMES A DAY.        Hydrocodone-Acetaminophen (Tab) NORCO 5-325 MG Take 1-2 Tabs by mouth every 8 hours as needed (pain).        Lisinopril (Tab) PRINIVIL 10 MG TAKE 1 TABLET BY MOUTH EVERY DAY        MetFORMIN HCl (Tab) GLUCOPHAGE 500 MG Take 2 Tabs by mouth 2 times a day, with meals.        Metoprolol Succinate (TABLET SR 24 HR) TOPROL XL 50 MG TAKE 1 TAB BY MOUTH EVERY DAY.        Multiple Vitamin (Tab) THERAGRAN  Take 1 Tab by mouth every day.        Omega-3 Fatty Acids (Cap) fish oil 1000 MG Take 1,000 mg by mouth every day.        Potassium   Take 1 Tab by mouth every day. Indications: OTC        RaNITidine HCl (Tab) ZANTAC 300 MG TAKE 1 TAB BY MOUTH EVERY DAY.        Sertraline HCl (Tab) ZOLOFT 100 MG Take 1 Tab by mouth every day.        Simvastatin (Tab) ZOCOR 10 MG Take 1 Tab by mouth every evening.        .                 Medicines prescribed today were sent to:     [facility]/PHARMACY #[id number] - [facility] [address]    [address] [address]    Phone: [phone] Fax: [phone]    Open 24 Hours?: No      Medication refill instructions:       If your prescription bottle indicates you have medication refills left, it is not necessary to call your provider’s office. Please contact your pharmacy and they will refill your medication.    If your prescription bottle indicates you do not have any refills left, you may request refills at any time through one of the following ways: The online [employer] system (except Urgent Care), by calling your provider’s office, or by asking your pharmacy to contact your provider’s office with a refill request.  Medication refills are processed only during regular business hours and may not be available until the next business day. Your provider may request additional information or to have a follow-up visit with you prior to refilling your medication.   *Please Note: Medication refills are assigned a new Rx number when refilled electronically. Your pharmacy may indicate that no refills were authorized even though a new prescription for the same medication is available at the pharmacy. Please request the medicine by name with the pharmacy before contacting your provider for a refill.        Your To Do List     Future Labs/Procedures Complete By Expires    COMP METABOLIC PANEL  As directed 7/1/2018    HEMOGLOBIN A1C  As directed 7/1/2018    LIPID PROFILE  As directed 7/1/2018    MICROALBUMIN CREAT RATIO URINE  As directed 7/1/2018    NM-HEART MUSCLE IMAGE,SPECT SING  As directed 6/30/2018      Other Notes About Your Plan     Fall risk done 5/28/15           MyChart Access Code: Activation code not generated  Current [facility] SITE Status: Active          Quit Tobacco Information     Do you want to quit using tobacco?    Quitting tobacco decreases risks of cancer, heart and lung disease, increases life expectancy, improves sense of taste and smell, and increases spending money, among other benefits.    If you are thinking about quitting, we can help.  • [facility] Quit Tobacco Program: 716.322.8106  o Program occurs weekly for four weeks and includes pharmacist consultation on products to support quitting smoking or chewing tobacco. A provider referral is needed for pharmacist consultation.  • Tobacco Users Help Hotline: 3-397-QUIT-NOW (264-8587) or https://[place].quitlogix.org/  o Free, confidential telephone and online coaching for [place] residents. Sessions are designed on a schedule that is convenient for you. Eligible clients receive free nicotine replacement therapy.  • Nationally: www.smokefree.gov  o Information and professional  assistance to support both immediate and long-term needs as you become, and remain, a non-smoker. Smokefree.gov allows you to choose the help that best fits your needs.

## 2017-06-30 NOTE — PROGRESS NOTES
Chief Complaint   Patient presents with   • Follow-Up     diabetes       HISTORY OF PRESENT ILLNESS: Patient is a 37 y.o. male established patient who presents today to follow-up on tobacco abuse, alcohol abuse, atypical chest pain, type 2 diabetes mellitus        Tobacco abuse counseling  Now smoking 1-1.5 ppd, notes occas cough, no hemoptysis. Some dyspnea on exertion.      Atypical chest pain  Patient reports at least weekly episodes of a feeling of abnormal motion or flip-flops in his central chest. Is not really reporting sharp or dull chest pain or defined chest pressure. At times this will occur with exertion but can also occur at rest. Recalls having a unremarkable treadmill approximately 5 years ago for a different category of pain at that time. Patient does smoke about a pack of cigarettes per day. Has diabetes over the past 5 years as well.    Diabetes mellitus type 2 in obese  Patient has been compliant taking metformin 1000 mg twice daily. Dietary compliance has waned in the past 3 months due to increased life stresses but patient is coming back on track.  low blood test denies any diaphoretic episodes including headache that would suggest low blood sugar.    Episodic alcohol abuse  Patient reports that he will allow himself to drink 2 nights per week typically on the weekend, down from several years ago where he drank each night. On days that he will drink he believes he is consuming about 16 ounces of bourbon per evening. Does not drink any other alcohol. Denies syncope, tremor  Patient Active Problem List    Diagnosis Date Noted   • Atypical chest pain 06/30/2017   • Alcohol abuse, episodic 01/19/2017   • Acute right flank pain 01/19/2017   • Left sided sciatica 01/03/2017   • Cellulitis of extremity 01/03/2017   • Otalgia of both ears 08/30/2016   • Sleep apnea in adult 06/06/2016   • Anxiety 01/26/2016   • Morbid obesity (CMS-HCC) 09/22/2015   • ALCOHOL ABUSE, EPISODIC 08/27/2015   • Tobacco abuse  counseling 08/27/2015   • GERD (gastroesophageal reflux disease) 04/06/2015   • Hypertriglyceridemia 01/05/2015   • Diabetes mellitus type 2 in obese (CMS-[facility]) 12/22/2014   • HTN (hypertension) 12/10/2014   • Chest pain 12/10/2014     Social history-, working 5 days per week  Allergies:Review of patient's allergies indicates no known allergies.    Current Outpatient Prescriptions   Medication Sig Dispense Refill   • sertraline (ZOLOFT) 100 MG Tab Take 1 Tab by mouth every day. 30 Tab 6   • metformin (GLUCOPHAGE) 500 MG Tab Take 2 Tabs by mouth 2 times a day, with meals. 120 Tab 11   • buPROPion SR (WELLBUTRIN-SR) 100 MG TABLET SR 12 HR TAKE 1 TAB BY MOUTH 2 TIMES A DAY. 60 Tab 6   • lisinopril (PRINIVIL) 10 MG Tab TAKE 1 TABLET BY MOUTH EVERY DAY 30 Tab 10   • Omega-3 Fatty Acids (FISH OIL) 1000 MG Cap capsule Take 1,000 mg by mouth every day.     • B Complex-C (SUPER B COMPLEX PO) Take 1 Tab by mouth every day.     • multivitamin (THERAGRAN) Tab Take 1 Tab by mouth every day.     • POTASSIUM PO Take 1 Tab by mouth every day. Indications: OTC     • cyclobenzaprine (FLEXERIL) 10 MG Tab Take 1 Tab by mouth 3 times a day as needed for Moderate Pain or Muscle Spasms. 30 Tab 0   • hydrocodone-acetaminophen (NORCO) 5-325 MG Tab per tablet Take 1-2 Tabs by mouth every 8 hours as needed (pain). 20 Tab 0   • gabapentin (NEURONTIN) 300 MG Cap TAKE 1 CAP BY MOUTH 3 TIMES A DAY. 90 Cap 6   • metoprolol SR (TOPROL XL) 50 MG TABLET SR 24 HR TAKE 1 TAB BY MOUTH EVERY DAY. 30 Tab 9   • simvastatin (ZOCOR) 10 MG Tab Take 1 Tab by mouth every evening. 30 Tab 11   • ranitidine (ZANTAC) 300 MG tablet TAKE 1 TAB BY MOUTH EVERY DAY. 30 Tab 6   • aspirin EC (ECOTRIN) 81 MG TBEC Take 81 mg by mouth every day.       No current facility-administered medications for this visit.       Social History   Substance Use Topics   • Smoking status: Current Every Day Smoker -- 1.50 packs/day for 21 years     Types: Cigarettes   • Smokeless  "tobacco: Never Used   • Alcohol Use: 0.0 oz/week     0 Standard drinks or equivalent per week      Comment: 3 to 4 times weekly       Family History   Problem Relation Age of Onset   • Diabetes Mother    • Stroke Father    • Alcohol/Drug Maternal Grandfather    • Diabetes Paternal Grandfather    • Cancer Paternal Grandfather      lung   • Heart Disease Neg Hx        ROS:  Review of Systems   Constitutional: Negative for fever, chills, weight loss and malaise/fatigue.   Eyes: Negative for blurred vision.   Respiratory: Negative for cough, sputum production, shortness of breath and wheezing.    Cardiovascular: Negative for chest pain, palpitations, orthopnea and leg swelling.   Gastrointestinal: Negative for heartburn, nausea, vomiting and abdominal pain.   Musculoskeletal: Negative for myalgias, back pain and joint pain.   Endo/Heme/Allergies: Does not bruise/bleed easily.                Exam:  Blood pressure 136/86, pulse 88, temperature 36.2 °C (97.2 °F), resp. rate 20, height 1.88 m (6' 2.02\"), weight 171.006 kg (377 lb), SpO2 91 %.  General:  Well nourished, well developed male in NAD  Head is grossly normal.  Neck: Supple without JVD or bruit. Thyroid is not enlarged. Trachea is midline.  Pulmonary: Clear to ausculation .  Normal effort. No rales, ronchi, or wheezing.  Cardiovascular: Regular rate and rhythm without murmur.  Abdomen-Abdomen is soft, normal bowel sounds, no masses, guarding, ororganomegaly, or tenderness.  Lower extremities- neg for pretibial edema, redness, tenderness.      Please note that this dictation was created using voice recognition software. I have made every reasonable attempt to correct obvious errors, but I expect that there are errors of grammar and possibly content that I did not discover before finalizing the note.    Assessment/Plan:  1. Essential hypertension     2. Diabetes mellitus type 2 in obese (CMS-HCC)  HEMOGLOBIN A1C    COMP METABOLIC PANEL    MICROALBUMIN CREAT RATIO " URINE    LIPID PROFILE   3. Morbid obesity due to excess calories (CMS-HCC)     4. Alcohol abuse, episodic     5. Atypical chest pain     6. Tobacco abuse counseling       Plan: 1. Restart morning blood glucose testing  2. Update A1c, CMP, urine microalbumin, fasting lipids  3. Persantine treadmill to evaluate atypical chest sensations  4. Patient is encouraged to more consistently reduce food intake to achieve gradual weight loss (reports he actually probably has lost 10 pounds preceding this visit).  5. Revisit in one month

## 2017-08-21 RX ORDER — RANITIDINE 300 MG/1
TABLET ORAL
Qty: 30 TAB | Refills: 6 | Status: SHIPPED | OUTPATIENT
Start: 2017-08-21 | End: 2018-06-08 | Stop reason: SDUPTHER

## 2017-09-03 ENCOUNTER — HOSPITAL ENCOUNTER (EMERGENCY)
Facility: MEDICAL CENTER | Age: 37
End: 2017-09-03
Attending: EMERGENCY MEDICINE
Payer: COMMERCIAL

## 2017-09-03 VITALS
SYSTOLIC BLOOD PRESSURE: 139 MMHG | RESPIRATION RATE: 16 BRPM | BODY MASS INDEX: 40.43 KG/M2 | HEART RATE: 70 BPM | HEIGHT: 74 IN | TEMPERATURE: 97.8 F | DIASTOLIC BLOOD PRESSURE: 93 MMHG | WEIGHT: 315 LBS

## 2017-09-03 DIAGNOSIS — L03.90 CELLULITIS, UNSPECIFIED CELLULITIS SITE: ICD-10-CM

## 2017-09-03 DIAGNOSIS — L02.91 ABSCESS: ICD-10-CM

## 2017-09-03 PROCEDURE — 99283 EMERGENCY DEPT VISIT LOW MDM: CPT

## 2017-09-03 PROCEDURE — 303485 HCHG DRESSING MEDIUM

## 2017-09-03 PROCEDURE — 303977 HCHG I & D

## 2017-09-03 RX ORDER — CEPHALEXIN 500 MG/1
500 CAPSULE ORAL 3 TIMES DAILY
Qty: 21 CAP | Refills: 0 | Status: SHIPPED | OUTPATIENT
Start: 2017-09-03 | End: 2017-09-10

## 2017-09-03 RX ORDER — SULFAMETHOXAZOLE AND TRIMETHOPRIM 800; 160 MG/1; MG/1
1 TABLET ORAL 2 TIMES DAILY
Qty: 14 TAB | Refills: 0 | Status: SHIPPED | OUTPATIENT
Start: 2017-09-03 | End: 2017-09-10

## 2017-09-03 ASSESSMENT — ENCOUNTER SYMPTOMS
CHILLS: 0
FEVER: 0
SENSORY CHANGE: 0

## 2017-09-04 NOTE — ED NOTES
Pt states that he gets abscesses to his medial upper thighs frequently. Pt states that he has another one and it is painful with movement, and walking and touch.

## 2017-09-04 NOTE — ED NOTES
Pt D/C to home. D/C instructions and prescriptions given to patient. Pt to f/u for any s/s of worsening infections. Pt verbalizes understanding. Pt leaves ED with no acute changes, complaints or concerns.

## 2017-09-04 NOTE — ED PROVIDER NOTES
ED Provider Note    Primary care provider: Nicho Tovar M.D.  Means of arrival: Private vehicle  History obtained from: Patient  History limited by: None    CHIEF COMPLAINT  Chief Complaint   Patient presents with   • Abscess       HPI  Luis Alfredo Javed is a 37 y.o. male who presents to the Emergency Department for abscess. Patient is a diabetic and reports he has a history of recurrent abscesses. They typically occur on his thighs, and he usually gets one a year. Patient reports over last couple of days he has noted swelling and induration in his right upper thigh for which she is concerned is an abscess. He reports that there is surrounding erythema around the area of induration. He is having moderate throbbing pain localized to the area of erythema. He denies fevers, chills, nausea, vomiting, chest pain, and shortness of breath. He typically does require antibiotics in order to treat his abscesses.    REVIEW OF SYSTEMS  Review of Systems   Constitutional: Negative for chills and fever.   Skin:        Positive for abscess and erythema   Neurological: Negative for sensory change.     PAST MEDICAL HISTORY   has a past medical history of Chest pain; Hypertension; and Type II or unspecified type diabetes mellitus without mention of complication, not stated as uncontrolled.    SURGICAL HISTORY   has a past surgical history that includes hernia repair (age 4) and strabismus repair (age 5).    SOCIAL HISTORY  Social History   Substance Use Topics   • Smoking status: Current Every Day Smoker     Packs/day: 1.50     Years: 21.00     Types: Cigarettes   • Smokeless tobacco: Never Used   • Alcohol use 0.0 oz/week      Comment: 3 to 4 times weekly      History   Drug Use No     Comment: quit IV meth 2005       FAMILY HISTORY  Family History   Problem Relation Age of Onset   • Diabetes Mother    • Stroke Father    • Alcohol/Drug Maternal Grandfather    • Diabetes Paternal Grandfather    • Cancer Paternal Grandfather   "    lung   • Heart Disease Neg Hx        CURRENT MEDICATIONS  Home Medications     Reviewed by Amy Cotter R.N. (Registered Nurse) on 09/03/17 at 1914  Med List Status: Complete   Medication Last Dose Status   aspirin EC (ECOTRIN) 81 MG TBEC 9/3/2017 Active   B Complex-C (SUPER B COMPLEX PO) 9/3/2017 Active   gabapentin (NEURONTIN) 300 MG Cap 9/3/2017 Active   lisinopril (PRINIVIL) 10 MG Tab 9/3/2017 Active   metformin (GLUCOPHAGE) 500 MG Tab 9/3/2017 Active   metoprolol SR (TOPROL XL) 50 MG TABLET SR 24 HR 9/3/2017 Active   multivitamin (THERAGRAN) Tab 9/3/2017 Active   Omega-3 Fatty Acids (FISH OIL) 1000 MG Cap capsule 9/3/2017 Active   POTASSIUM PO 6/28/2017 Active   ranitidine (ZANTAC) 300 MG tablet  Active   sertraline (ZOLOFT) 100 MG Tab 9/3/2017 Active   simvastatin (ZOCOR) 10 MG Tab 9/3/2017 Active                ALLERGIES  No Known Allergies    PHYSICAL EXAM  VITAL SIGNS: /93   Pulse 70   Temp 36.6 °C (97.8 °F)   Resp 16   Ht 1.88 m (6' 2\") Comment: Stated  Wt (!) 167 kg (368 lb 2.7 oz)   BMI 47.27 kg/m²   Vitals reviewed by myself.  Physical Exam   Constitutional: He is oriented to person, place, and time and well-developed, well-nourished, and in no distress.   HENT:   Head: Normocephalic.   Eyes: EOM are normal.   Neck: Normal range of motion.   Cardiovascular: Normal rate and regular rhythm.  Exam reveals no gallop and no friction rub.    No murmur heard.  Pulmonary/Chest: Effort normal.   Abdominal: Soft. There is no tenderness.   Neurological: He is alert and oriented to person, place, and time.   Skin: Skin is warm and dry.   Patient has a 2 x 2 centimeter area of induration on his right inner thigh with associated erythema surrounding the area of induration.       DIAGNOSTIC STUDIES /  LABS  None    RADIOLOGY  None    PROCEDURES  Incision and Drainage Procedure Note    Indication: Abscess    Procedure: The patient was positioned appropriately and the skin over the incision site " was prepped with chlorhexidine. Local anesthesia was obtained by infiltration using 2% Lidocaine without epinephrine.  An incision was then made over the apex of the lesion and approximately 3 cc of serosanguineous material was expressed. Loculations were broken up using forceps but no more material was returned.  The patient’s tetanus status was up to date and did not require a booster dose.    The patient tolerated the procedure well.    Complications: None      COURSE & MEDICAL DECISION MAKING  Nursing notes, VS, PMSFHx reviewed in chart.    Patient is a 37-year-old male who comes in for possible abscess. Differential diagnosis includes cellulitis, abscess, erysipelas. Physical exam is consistent with abscess. Initial vitals are within normal limits. Patient has a 3 x 3 cm area of cellulitis surrounding abscess. Therefore I will drain the abscess and start patient on outpatient course of antibiotics, Bactrim and Keflex. Abscess is drained without complication, please see procedure note above for details. Patient has been given strict return precautions and discharged home in stable condition with vitals in normal limits.    FINAL IMPRESSION  1. Abscess    2. Cellulitis, unspecified cellulitis site

## 2017-09-04 NOTE — DISCHARGE INSTRUCTIONS
Abscess  An abscess (boil or furuncle) is an infected area on or under the skin. This area is filled with yellowish-white fluid (pus) and other material (debris).  HOME CARE   · Only take medicines as told by your doctor.  · If you were given antibiotic medicine, take it as directed. Finish the medicine even if you start to feel better.  · If gauze is used, follow your doctor's directions for changing the gauze.  · To avoid spreading the infection:  ¨ Keep your abscess covered with a bandage.  ¨ Wash your hands well.  ¨ Do not share personal care items, towels, or whirlpools with others.  ¨ Avoid skin contact with others.  · Keep your skin and clothes clean around the abscess.  · Keep all doctor visits as told.  GET HELP RIGHT AWAY IF:   · You have more pain, puffiness (swelling), or redness in the wound site.  · You have more fluid or blood coming from the wound site.  · You have muscle aches, chills, or you feel sick.  · You have a fever.  MAKE SURE YOU:   · Understand these instructions.  · Will watch your condition.  · Will get help right away if you are not doing well or get worse.     This information is not intended to replace advice given to you by your health care provider. Make sure you discuss any questions you have with your health care provider.     Document Released: 06/05/2009 Document Revised: 06/18/2013 Document Reviewed: 03/02/2013  [employer] Interactive Patient Education ©2016 [employer]

## 2017-09-07 ENCOUNTER — OFFICE VISIT (OUTPATIENT)
Dept: MEDICAL GROUP | Facility: MEDICAL CENTER | Age: 37
End: 2017-09-07
Attending: FAMILY MEDICINE
Payer: COMMERCIAL

## 2017-09-07 VITALS
HEIGHT: 74 IN | TEMPERATURE: 97.1 F | DIASTOLIC BLOOD PRESSURE: 82 MMHG | OXYGEN SATURATION: 94 % | SYSTOLIC BLOOD PRESSURE: 128 MMHG | WEIGHT: 315 LBS | RESPIRATION RATE: 16 BRPM | HEART RATE: 104 BPM | BODY MASS INDEX: 40.43 KG/M2

## 2017-09-07 DIAGNOSIS — M25.511 ACUTE PAIN OF BOTH SHOULDERS: ICD-10-CM

## 2017-09-07 DIAGNOSIS — M25.512 ACUTE PAIN OF BOTH SHOULDERS: ICD-10-CM

## 2017-09-07 DIAGNOSIS — E66.9 DIABETES MELLITUS TYPE 2 IN OBESE (HCC): ICD-10-CM

## 2017-09-07 DIAGNOSIS — I10 ESSENTIAL HYPERTENSION: ICD-10-CM

## 2017-09-07 DIAGNOSIS — E11.69 DIABETES MELLITUS TYPE 2 IN OBESE (HCC): ICD-10-CM

## 2017-09-07 PROCEDURE — 99212 OFFICE O/P EST SF 10 MIN: CPT | Performed by: FAMILY MEDICINE

## 2017-09-07 PROCEDURE — 99214 OFFICE O/P EST MOD 30 MIN: CPT | Performed by: FAMILY MEDICINE

## 2017-09-07 RX ORDER — GABAPENTIN 300 MG/1
300 CAPSULE ORAL 3 TIMES DAILY
Qty: 90 CAP | Refills: 6 | Status: SHIPPED | OUTPATIENT
Start: 2017-09-07 | End: 2018-06-08 | Stop reason: SDUPTHER

## 2017-09-07 RX ORDER — METOPROLOL SUCCINATE 50 MG/1
50 TABLET, EXTENDED RELEASE ORAL
Qty: 30 TAB | Refills: 9 | Status: SHIPPED | OUTPATIENT
Start: 2017-09-07 | End: 2018-06-08 | Stop reason: SDUPTHER

## 2017-09-07 ASSESSMENT — PAIN SCALES - GENERAL: PAINLEVEL: NO PAIN

## 2017-09-07 NOTE — ASSESSMENT & PLAN NOTE
Patient reports recurrent limitation in range of motion more than pain (which is occasional) in his right shoulder. Recalls doing exercises using Thera-Band's about 6 months ago at which time the interrupted motion began. No associated numbness or tingling in his right arm.

## 2017-09-07 NOTE — ASSESSMENT & PLAN NOTE
"Denies any recent chest pain, palpitations, chest pressure. He has not had the \"flip-flops\" feelings in his chest over the past several months. With a interruption his insurance coverage previously scheduled treadmill did not get performed.  "

## 2017-09-07 NOTE — ASSESSMENT & PLAN NOTE
Patient ports is currently out of strips recalls last blood sugar proximal 170 number of weeks ago. Not had any symptomatic low blood sugars characterized by headache or diaphoresis. New strips and lancets ordered today.

## 2017-09-07 NOTE — PROGRESS NOTES
"Chief Complaint   Patient presents with   • Follow-Up       HISTORY OF PRESENT ILLNESS: Patient is a 37 y.o. male established patient who presents today toFollow-up on persistent right shoulder problem, diabetes mellitus, hypertension        HTN (hypertension)  Denies any recent chest pain, palpitations, chest pressure. He has not had the \"flip-flops\" feelings in his chest over the past several months. With a interruption his insurance coverage previously scheduled treadmill did not get performed.    Diabetes mellitus type 2 in obese  Patient ports is currently out of strips recalls last blood sugar proximal 170 number of weeks ago. Not had any symptomatic low blood sugars characterized by headache or diaphoresis. New strips and lancets ordered today.    Acute pain of both shoulders  Patient reports recurrent limitation in range of motion more than pain (which is occasional) in his right shoulder. Recalls doing exercises using Thera-Band's about 6 months ago at which time the interrupted motion began. Initially had sharp pain in the anterior aspect of his right shoulder for several days but that chart pain has not recurred. No associated numbness or tingling in his right arm.      Patient Active Problem List    Diagnosis Date Noted   • Acute pain of both shoulders 09/07/2017   • Atypical chest pain 06/30/2017   • Alcohol abuse, episodic 01/19/2017   • Acute right flank pain 01/19/2017   • Left sided sciatica 01/03/2017   • Cellulitis of extremity 01/03/2017   • Otalgia of both ears 08/30/2016   • Sleep apnea in adult 06/06/2016   • Anxiety 01/26/2016   • Morbid obesity (CMS-HCC) 09/22/2015   • Tobacco abuse counseling 08/27/2015   • GERD (gastroesophageal reflux disease) 04/06/2015   • Hypertriglyceridemia 01/05/2015   • Diabetes mellitus type 2 in obese (CMS-HCC) 12/22/2014   • HTN (hypertension) 12/10/2014   • Chest pain 12/10/2014       Allergies:Review of patient's allergies indicates no known " allergies.    Current Outpatient Prescriptions   Medication Sig Dispense Refill   • sulfamethoxazole-trimethoprim (BACTRIM DS) 800-160 MG tablet Take 1 Tab by mouth 2 times a day for 7 days. 14 Tab 0   • cephALEXin (KEFLEX) 500 MG Cap Take 1 Cap by mouth 3 times a day for 7 days. 21 Cap 0   • ranitidine (ZANTAC) 300 MG tablet TAKE 1 TAB BY MOUTH EVERY DAY. 30 Tab 6   • sertraline (ZOLOFT) 100 MG Tab Take 1 Tab by mouth every day. 30 Tab 6   • metformin (GLUCOPHAGE) 500 MG Tab Take 2 Tabs by mouth 2 times a day, with meals. 120 Tab 11   • lisinopril (PRINIVIL) 10 MG Tab TAKE 1 TABLET BY MOUTH EVERY DAY 30 Tab 10   • Omega-3 Fatty Acids (FISH OIL) 1000 MG Cap capsule Take 1,000 mg by mouth every day.     • B Complex-C (SUPER B COMPLEX PO) Take 1 Tab by mouth every day.     • multivitamin (THERAGRAN) Tab Take 1 Tab by mouth every day.     • POTASSIUM PO Take 1 Tab by mouth every day. Indications: OTC     • gabapentin (NEURONTIN) 300 MG Cap TAKE 1 CAP BY MOUTH 3 TIMES A DAY. 90 Cap 6   • metoprolol SR (TOPROL XL) 50 MG TABLET SR 24 HR TAKE 1 TAB BY MOUTH EVERY DAY. 30 Tab 9   • simvastatin (ZOCOR) 10 MG Tab Take 1 Tab by mouth every evening. 30 Tab 11   • aspirin EC (ECOTRIN) 81 MG TBEC Take 81 mg by mouth every day.       No current facility-administered medications for this visit.      Social history-, working  Social History   Substance Use Topics   • Smoking status: Current Every Day Smoker     Packs/day: 1.50     Years: 21.00     Types: Cigarettes   • Smokeless tobacco: Never Used   • Alcohol use 0.0 oz/week      Comment: 3 to 4 times weekly       Family History   Problem Relation Age of Onset   • Diabetes Mother    • Stroke Father    • Alcohol/Drug Maternal Grandfather    • Diabetes Paternal Grandfather    • Cancer Paternal Grandfather      lung   • Heart Disease Neg Hx        ROS:  Review of Systems   Constitutional: Negative for fever, chills, weight loss and malaise/fatigue.   Eyes: Negative for blurred  "vision.   Respiratory: Negative for cough, sputum production, shortness of breath and wheezing.    Cardiovascular: Negative for chest pain, palpitations, orthopnea and leg swelling.   Gastrointestinal: Negative for heartburn, nausea, vomiting and abdominal pain.   Endo/Heme/Allergies: Does not bruise/bleed easily.               Exam:  Blood pressure 128/82, pulse (!) 104, temperature 36.2 °C (97.1 °F), resp. rate 16, height 1.88 m (6' 2.02\"), weight (!) 168.7 kg (372 lb), SpO2 94 %.  General:  Well nourished, well developed male in NAD  Head is grossly normal.  Neck: Supple without JVD or bruit. Thyroid is not enlarged. Trachea is midline.  Pulmonary: Clear to ausculation .  Normal effort. No rales, ronchi, or wheezing.  Cardiovascular: Regular rate and rhythm without murmur.  Abdomen-Abdomen is soft, normal bowel sounds, no masses, guarding, ororganomegaly, or tenderness.  Lower extremities- neg for pretibial edema, redness, tenderness.  Upper extremities-right shoulder shows full anterior flexion with full lateral abduction although there is a slight hesitation at about 90°. Nontender to palpation. Left shoulder shows abduction and flexion limited to about 150° today due to tightness.    Please note that this dictation was created using voice recognition software. I have made every reasonable attempt to correct obvious errors, but I expect that there are errors of grammar and possibly content that I did not discover before finalizing the note.    Assessment/Plan:  1. Diabetes mellitus type 2 in obese (CMS-HCC)     2. Essential hypertension     3. Acute pain of both shoulders        Plan: 1. Lab order for A1c, CMP, lipids, urine microalbumin reissued today  2. PT referral  3. Continue to limit portions and achieve further gradual weight loss  4. Revisit within 3 months  "

## 2017-09-18 ENCOUNTER — APPOINTMENT (OUTPATIENT)
Dept: RADIOLOGY | Facility: MEDICAL CENTER | Age: 37
End: 2017-09-18
Attending: EMERGENCY MEDICINE
Payer: COMMERCIAL

## 2017-09-18 ENCOUNTER — HOSPITAL ENCOUNTER (EMERGENCY)
Facility: MEDICAL CENTER | Age: 37
End: 2017-09-18
Attending: EMERGENCY MEDICINE
Payer: COMMERCIAL

## 2017-09-18 VITALS
WEIGHT: 315 LBS | OXYGEN SATURATION: 91 % | HEART RATE: 88 BPM | RESPIRATION RATE: 18 BRPM | DIASTOLIC BLOOD PRESSURE: 85 MMHG | BODY MASS INDEX: 40.43 KG/M2 | TEMPERATURE: 98.2 F | HEIGHT: 74 IN | SYSTOLIC BLOOD PRESSURE: 135 MMHG

## 2017-09-18 DIAGNOSIS — J06.9 UPPER RESPIRATORY TRACT INFECTION, UNSPECIFIED TYPE: ICD-10-CM

## 2017-09-18 DIAGNOSIS — E66.01 MORBID OBESITY DUE TO EXCESS CALORIES (HCC): ICD-10-CM

## 2017-09-18 DIAGNOSIS — R06.02 SOB (SHORTNESS OF BREATH): ICD-10-CM

## 2017-09-18 LAB
ALBUMIN SERPL BCP-MCNC: 4.7 G/DL (ref 3.2–4.9)
ALBUMIN/GLOB SERPL: 1.4 G/DL
ALP SERPL-CCNC: 94 U/L (ref 30–99)
ALT SERPL-CCNC: 74 U/L (ref 2–50)
ANION GAP SERPL CALC-SCNC: 12 MMOL/L (ref 0–11.9)
APPEARANCE UR: CLEAR
AST SERPL-CCNC: 37 U/L (ref 12–45)
BACTERIA #/AREA URNS HPF: ABNORMAL /HPF
BASOPHILS # BLD AUTO: 0.7 % (ref 0–1.8)
BASOPHILS # BLD: 0.07 K/UL (ref 0–0.12)
BILIRUB SERPL-MCNC: 0.5 MG/DL (ref 0.1–1.5)
BILIRUB UR QL STRIP.AUTO: NEGATIVE
BUN SERPL-MCNC: 18 MG/DL (ref 8–22)
CALCIUM SERPL-MCNC: 9.7 MG/DL (ref 8.4–10.2)
CASTS URNS QL MICRO: ABNORMAL /LPF
CHLORIDE SERPL-SCNC: 100 MMOL/L (ref 96–112)
CO2 SERPL-SCNC: 21 MMOL/L (ref 20–33)
COLOR UR: YELLOW
CREAT SERPL-MCNC: 0.87 MG/DL (ref 0.5–1.4)
EOSINOPHIL # BLD AUTO: 0.06 K/UL (ref 0–0.51)
EOSINOPHIL NFR BLD: 0.6 % (ref 0–6.9)
ERYTHROCYTE [DISTWIDTH] IN BLOOD BY AUTOMATED COUNT: 45.9 FL (ref 35.9–50)
FLUAV+FLUBV AG SPEC QL IA: NORMAL
GFR SERPL CREATININE-BSD FRML MDRD: >60 ML/MIN/1.73 M 2
GLOBULIN SER CALC-MCNC: 3.4 G/DL (ref 1.9–3.5)
GLUCOSE SERPL-MCNC: 271 MG/DL (ref 65–99)
GLUCOSE UR STRIP.AUTO-MCNC: 500 MG/DL
HCT VFR BLD AUTO: 44.4 % (ref 42–52)
HGB BLD-MCNC: 15.4 G/DL (ref 14–18)
IMM GRANULOCYTES # BLD AUTO: 0.09 K/UL (ref 0–0.11)
IMM GRANULOCYTES NFR BLD AUTO: 0.8 % (ref 0–0.9)
KETONES UR STRIP.AUTO-MCNC: NEGATIVE MG/DL
LACTATE BLD-SCNC: 1.94 MMOL/L (ref 0.5–2)
LACTATE BLD-SCNC: 2.62 MMOL/L (ref 0.5–2)
LEUKOCYTE ESTERASE UR QL STRIP.AUTO: NEGATIVE
LYMPHOCYTES # BLD AUTO: 3.25 K/UL (ref 1–4.8)
LYMPHOCYTES NFR BLD: 30.5 % (ref 22–41)
MCH RBC QN AUTO: 32.4 PG (ref 27–33)
MCHC RBC AUTO-ENTMCNC: 34.7 G/DL (ref 33.7–35.3)
MCV RBC AUTO: 93.5 FL (ref 81.4–97.8)
MICRO URNS: ABNORMAL
MONOCYTES # BLD AUTO: 0.7 K/UL (ref 0–0.85)
MONOCYTES NFR BLD AUTO: 6.6 % (ref 0–13.4)
MUCOUS THREADS #/AREA URNS HPF: ABNORMAL /HPF
NEUTROPHILS # BLD AUTO: 6.47 K/UL (ref 1.82–7.42)
NEUTROPHILS NFR BLD: 60.8 % (ref 44–72)
NITRITE UR QL STRIP.AUTO: NEGATIVE
NRBC # BLD AUTO: 0.02 K/UL
NRBC BLD AUTO-RTO: 0.2 /100 WBC
PH UR STRIP.AUTO: 5.5 [PH]
PLATELET # BLD AUTO: 179 K/UL (ref 164–446)
PMV BLD AUTO: 10.2 FL (ref 9–12.9)
POTASSIUM SERPL-SCNC: 4.2 MMOL/L (ref 3.6–5.5)
PROT SERPL-MCNC: 8.1 G/DL (ref 6–8.2)
PROT UR QL STRIP: 100 MG/DL
RBC # BLD AUTO: 4.75 M/UL (ref 4.7–6.1)
RBC # URNS HPF: ABNORMAL /HPF
RBC UR QL AUTO: NEGATIVE
SIGNIFICANT IND 70042: NORMAL
SITE SITE: NORMAL
SODIUM SERPL-SCNC: 133 MMOL/L (ref 135–145)
SOURCE SOURCE: NORMAL
SP GR UR REFRACTOMETRY: NORMAL
SPECIMEN DRAWN FROM PATIENT: ABNORMAL
SPECIMEN DRAWN FROM PATIENT: NORMAL
WBC # BLD AUTO: 10.6 K/UL (ref 4.8–10.8)
WBC #/AREA URNS HPF: ABNORMAL /HPF

## 2017-09-18 PROCEDURE — 99284 EMERGENCY DEPT VISIT MOD MDM: CPT

## 2017-09-18 PROCEDURE — 700111 HCHG RX REV CODE 636 W/ 250 OVERRIDE (IP): Performed by: EMERGENCY MEDICINE

## 2017-09-18 PROCEDURE — 87086 URINE CULTURE/COLONY COUNT: CPT

## 2017-09-18 PROCEDURE — 83605 ASSAY OF LACTIC ACID: CPT | Mod: 91

## 2017-09-18 PROCEDURE — 71010 DX-CHEST-PORTABLE (1 VIEW): CPT

## 2017-09-18 PROCEDURE — 81001 URINALYSIS AUTO W/SCOPE: CPT

## 2017-09-18 PROCEDURE — 80053 COMPREHEN METABOLIC PANEL: CPT

## 2017-09-18 PROCEDURE — 87040 BLOOD CULTURE FOR BACTERIA: CPT

## 2017-09-18 PROCEDURE — 85025 COMPLETE CBC W/AUTO DIFF WBC: CPT

## 2017-09-18 PROCEDURE — 36415 COLL VENOUS BLD VENIPUNCTURE: CPT

## 2017-09-18 PROCEDURE — 96365 THER/PROPH/DIAG IV INF INIT: CPT

## 2017-09-18 PROCEDURE — 700105 HCHG RX REV CODE 258: Performed by: EMERGENCY MEDICINE

## 2017-09-18 PROCEDURE — 87400 INFLUENZA A/B EACH AG IA: CPT

## 2017-09-18 RX ORDER — BENZONATATE 100 MG/1
100 CAPSULE ORAL 3 TIMES DAILY PRN
Qty: 60 CAP | Refills: 0 | Status: SHIPPED | OUTPATIENT
Start: 2017-09-18 | End: 2017-11-30

## 2017-09-18 RX ORDER — DOXYCYCLINE 100 MG/1
100 CAPSULE ORAL 2 TIMES DAILY
Qty: 20 CAP | Refills: 0 | Status: SHIPPED | OUTPATIENT
Start: 2017-09-18 | End: 2017-09-28

## 2017-09-18 RX ORDER — SODIUM CHLORIDE 9 MG/ML
30 INJECTION, SOLUTION INTRAVENOUS
Status: COMPLETED | OUTPATIENT
Start: 2017-09-18 | End: 2017-09-18

## 2017-09-18 RX ORDER — CEFTRIAXONE 2 G/1
2 INJECTION, POWDER, FOR SOLUTION INTRAMUSCULAR; INTRAVENOUS ONCE
Status: COMPLETED | OUTPATIENT
Start: 2017-09-18 | End: 2017-09-18

## 2017-09-18 RX ADMIN — CEFTRIAXONE 2 G: 2 INJECTION, POWDER, FOR SOLUTION INTRAMUSCULAR; INTRAVENOUS at 17:56

## 2017-09-18 RX ADMIN — SODIUM CHLORIDE 1000 ML: 9 INJECTION, SOLUTION INTRAVENOUS at 17:46

## 2017-09-18 ASSESSMENT — PAIN SCALES - GENERAL: PAINLEVEL_OUTOF10: 2

## 2017-09-18 NOTE — ED NOTES
Pt bib friend with c/o of cough and congestion since 9/10. Pt reports mild SOB and difficulty breathing.

## 2017-09-19 ENCOUNTER — PATIENT OUTREACH (OUTPATIENT)
Dept: HEALTH INFORMATION MANAGEMENT | Facility: OTHER | Age: 37
End: 2017-09-19

## 2017-09-19 NOTE — ED PROVIDER NOTES
"ED Provider Note    CHIEF COMPLAINT  Chief Complaint   Patient presents with   • Shortness of Breath   • Congestion   • Cough       HPI  Luis Alfredo Casimiro Javed is a 37 y.o. male who presents Stating that she sat cough and congestion with 9 out of 10 chest discomfort and difficulty breathing. Denies any significant fevers. Denies any history of asthma or pulmonary embolism. Denies any leg pain or swelling. Denies any contact with influenza. 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 He says he's been sick for about a week with fevers and has had some sweats of the forehead today. Denies any hemoptysis or weight loss but has had poor appetite and fluid intake    REVIEW OF SYSTEMS  See HPI for further details. All other systems are negative.     PAST MEDICAL HISTORY  Past Medical History:   Diagnosis Date   • Chest pain     x 5 years.   • Hypertension    • Type II or unspecified type diabetes mellitus without mention of complication, not stated as uncontrolled        FAMILY HISTORY  Family History   Problem Relation Age of Onset   • Diabetes Mother    • Stroke Father    • Alcohol/Drug Maternal Grandfather    • Diabetes Paternal Grandfather    • Cancer Paternal Grandfather      lung   • Heart Disease Neg Hx        SOCIAL HISTORY   reports that he has been smoking Cigarettes.  He has a 31.50 pack-year smoking history. He has never used smokeless tobacco. He reports that he drinks alcohol. He reports that he does not use drugs.    SURGICAL HISTORY  Past Surgical History:   Procedure Laterality Date   • HERNIA REPAIR  age 4   • STRABISMUS REPAIR  age 5       CURRENT MEDICATIONS  Home Medications    **Home medications have not yet been reviewed for this encounter**         ALLERGIES  No Known Allergies    PHYSICAL EXAM  VITAL SIGNS: BP (!) 145/32   Pulse 92   Temp 37.6 °C (99.6 °F)   Resp (!) 25   Ht 1.88 m (6' 2\")   " Wt (!) 167.8 kg (370 lb)   SpO2 95%   BMI 47.51 kg/m²    Constitutional: Well developed, Well nourished,Mild respiratory distress,Ill appearance.   HENT: Normocephalic, Atraumatic, Bilateral external ears normal, Oropharynx is clear mucous membranes are dry. No oral exudates or nasal discharge.   Eyes: Pupils are equal round and reactive, EOMI, Conjunctiva normal, No discharge.   Neck: Normal range of motion, No tenderness, Supple, No stridor. No meningismus.  Lymphatic: No lymphadenopathy noted.   Cardiovascular: Regular rate and rhythm without murmur rub or gallop.  Thorax & Lungs: Clear breath sounds bilaterally without wheezes, rhonchi or rales. There is no chest wall tenderness.   Abdomen: Soft, morbidly obese, non-tender non-distended. There is no rebound or guarding. No organomegaly is appreciated. Bowel sounds are normal.  Skin: Forehead diaphoresis without rash.   Back: No CVA or spinal tenderness.   Extremities: Intact distal pulses, No edema, No tenderness, No cyanosis, No clubbing. Capillary refill is less than 2 seconds.  Musculoskeletal: Good range of motion in all major joints. No tenderness to palpation or major deformities noted.   Neurologic: Alert & oriented x 3, Normal motor function, Normal sensory function, No focal deficits noted. Reflexes are normal.  Psychiatric: Affect normal, Judgment normal, Mood normal. There is no suicidal ideation or patient reported hallucinations.       RADIOLOGY/PROCEDURES  DX-CHEST-PORTABLE (1 VIEW)   Final Result      No pulmonary consolidation.            COURSE & MEDICAL DECISION MAKING  Pertinent Labs & Imaging studies reviewed. (See chart for details)  Patient presents with shortness of breath, cough, congestion for 1 week. I was concerned about influenza given his presentation with body aches. He flagged for sepsis and therefore we aggressively treated him with fluids and gave him a bolus of possibly 3 L normal saline wide open. His lactate fortunately came  back initially 2.62 and then subsequently after fluids 1.94. There is no significant acidosis. No evidence of significant leukocytosis.    Chest x-ray shows no consolidation/pneumonia/cardiomegaly    The patient on reevaluation appears much better. He is no longer sweating. His heart rate is coming down. He feels a whole lot better and would like to go home. I think this is reasonable and I think he has influenza and would not benefit at this point from Tamiflu which is low benefit clinically anyway. Patient will return if any significant change in symptoms and is discharged in stable condition    FINAL IMPRESSION  1. SOB (shortness of breath)    2. Upper respiratory tract infection, unspecified type    3. Morbid obesity due to excess calories (CMS-[facility])             Electronically signed by: Ray Coelho, 9/18/2017 8:18 PM

## 2017-09-19 NOTE — ED NOTES
Released with all follow-up, RX given and pt encouraged to use a vaporizer and see PCP or return with any worsening.

## 2017-09-19 NOTE — DISCHARGE INSTRUCTIONS
Cool Mist Vaporizers  Vaporizers may help relieve the symptoms of a cough and cold. They add moisture to the air, which helps mucus to become thinner and less sticky. This makes it easier to breathe and cough up secretions. Cool mist vaporizers do not cause serious burns like hot mist vaporizers, which may also be called steamers or humidifiers. Vaporizers have not been proven to help with colds. You should not use a vaporizer if you are allergic to mold.  HOME CARE INSTRUCTIONS  · Follow the package instructions for the vaporizer.  · Do not use anything other than distilled water in the vaporizer.  · Do not run the vaporizer all of the time. This can cause mold or bacteria to grow in the vaporizer.  · Clean the vaporizer after each time it is used.  · Clean and dry the vaporizer well before storing it.  · Stop using the vaporizer if worsening respiratory symptoms develop.     This information is not intended to replace advice given to you by your health care provider. Make sure you discuss any questions you have with your health care provider.     Document Released: 09/14/2005 Document Revised: 12/23/2014 Document Reviewed: 05/07/2014  [employer] Patient Education ©2016 [employer]      Cough, Adult   A cough is a reflex that helps clear your throat and airways. It can help heal the body or may be a reaction to an irritated airway. A cough may only last 2 or 3 weeks (acute) or may last more than 8 weeks (chronic).   CAUSES  Acute cough:  · Viral or bacterial infections.  Chronic cough:  · Infections.  · Allergies.  · Asthma.  · Post-nasal drip.  · Smoking.  · Heartburn or acid reflux.  · Some medicines.  · Chronic lung problems (COPD).  · Cancer.  SYMPTOMS   · Cough.  · Fever.  · Chest pain.  · Increased breathing rate.  · High-pitched whistling sound when breathing (wheezing).  · Colored mucus that you cough up (sputum).  TREATMENT   · A bacterial cough may be treated with antibiotic medicine.  · A  viral cough must run its course and will not respond to antibiotics.  · Your caregiver may recommend other treatments if you have a chronic cough.  HOME CARE INSTRUCTIONS   · Only take over-the-counter or prescription medicines for pain, discomfort, or fever as directed by your caregiver. Use cough suppressants only as directed by your caregiver.  · Use a cold steam vaporizer or humidifier in your bedroom or home to help loosen secretions.  · Sleep in a semi-upright position if your cough is worse at night.  · Rest as needed.  · Stop smoking if you smoke.  SEEK IMMEDIATE MEDICAL CARE IF:   · You have pus in your sputum.  · Your cough starts to worsen.  · You cannot control your cough with suppressants and are losing sleep.  · You begin coughing up blood.  · You have difficulty breathing.  · You develop pain which is getting worse or is uncontrolled with medicine.  · You have a fever.  MAKE SURE YOU:   · Understand these instructions.  · Will watch your condition.  · Will get help right away if you are not doing well or get worse.     This information is not intended to replace advice given to you by your health care provider. Make sure you discuss any questions you have with your health care provider.     Document Released: 06/15/2012 Document Revised: 03/11/2013 Document Reviewed: 02/24/2016  [employer] Interactive Patient Education ©2016 [employer]

## 2017-09-20 LAB
BACTERIA UR CULT: NORMAL
SIGNIFICANT IND 70042: NORMAL
SITE SITE: NORMAL
SOURCE SOURCE: NORMAL

## 2017-09-23 LAB
BACTERIA BLD CULT: NORMAL
BACTERIA BLD CULT: NORMAL
SIGNIFICANT IND 70042: NORMAL
SIGNIFICANT IND 70042: NORMAL
SITE SITE: NORMAL
SITE SITE: NORMAL
SOURCE SOURCE: NORMAL
SOURCE SOURCE: NORMAL

## 2017-10-02 ENCOUNTER — PATIENT MESSAGE (OUTPATIENT)
Dept: MEDICAL GROUP | Facility: MEDICAL CENTER | Age: 37
End: 2017-10-02

## 2017-10-05 ENCOUNTER — OFFICE VISIT (OUTPATIENT)
Dept: MEDICAL GROUP | Facility: MEDICAL CENTER | Age: 37
End: 2017-10-05
Attending: FAMILY MEDICINE
Payer: COMMERCIAL

## 2017-10-05 VITALS
DIASTOLIC BLOOD PRESSURE: 82 MMHG | HEART RATE: 100 BPM | HEIGHT: 74 IN | RESPIRATION RATE: 16 BRPM | TEMPERATURE: 96.6 F | SYSTOLIC BLOOD PRESSURE: 124 MMHG | WEIGHT: 315 LBS | BODY MASS INDEX: 40.43 KG/M2 | OXYGEN SATURATION: 92 %

## 2017-10-05 DIAGNOSIS — E66.9 DIABETES MELLITUS TYPE 2 IN OBESE (HCC): ICD-10-CM

## 2017-10-05 DIAGNOSIS — E66.01 MORBID OBESITY (HCC): ICD-10-CM

## 2017-10-05 DIAGNOSIS — E11.69 DIABETES MELLITUS TYPE 2 IN OBESE (HCC): ICD-10-CM

## 2017-10-05 DIAGNOSIS — I10 ESSENTIAL HYPERTENSION: ICD-10-CM

## 2017-10-05 LAB — GLUCOSE BLD-MCNC: 285 MG/DL (ref 70–100)

## 2017-10-05 PROCEDURE — 99213 OFFICE O/P EST LOW 20 MIN: CPT | Performed by: FAMILY MEDICINE

## 2017-10-05 PROCEDURE — 82948 REAGENT STRIP/BLOOD GLUCOSE: CPT | Performed by: FAMILY MEDICINE

## 2017-10-05 PROCEDURE — 99214 OFFICE O/P EST MOD 30 MIN: CPT | Performed by: FAMILY MEDICINE

## 2017-10-05 PROCEDURE — 82962 GLUCOSE BLOOD TEST: CPT | Performed by: FAMILY MEDICINE

## 2017-10-05 RX ORDER — PHENTERMINE HYDROCHLORIDE 37.5 MG/1
37.5 CAPSULE ORAL EVERY MORNING
Qty: 30 CAP | Refills: 0 | Status: SHIPPED | OUTPATIENT
Start: 2017-10-05 | End: 2017-11-30

## 2017-10-05 RX ORDER — PIOGLITAZONEHYDROCHLORIDE 45 MG/1
45 TABLET ORAL DAILY
Qty: 30 TAB | Refills: 11 | Status: SHIPPED | OUTPATIENT
Start: 2017-10-05 | End: 2018-10-09 | Stop reason: SDUPTHER

## 2017-10-05 ASSESSMENT — PAIN SCALES - GENERAL: PAINLEVEL: NO PAIN

## 2017-10-05 NOTE — ASSESSMENT & PLAN NOTE
Patient reports increased social stress with his grandmother, who lives with them, breaking her hip, along with opening a new campaign to legally be able to continue to have visitation with his 14-year-old daughter. As result patient reports food choices have faltered somewhat and he is noted to persistently high sugars and 300-500 range over the past several weeks. Is not noted any low blood sugars characterized by headache or diaphoresis. Updated A1c did return at 9.5 previously he had been about 2 points lower than this. Patient is compliant in taking his metformin 1000 mg twice daily. Although patient is not reporting any dysuria or hematuria. Microvena and creatinine ratio (quest) has risen to 410.

## 2017-10-05 NOTE — PROGRESS NOTES
Chief Complaint   Patient presents with   • Hyperglycemia       HISTORY OF PRESENT ILLNESS: Patient is a 37 y.o. male established patient who presents today toFollow-up on poorly controlled diabetes mellitus, morbid obesity, hypertension        Diabetes mellitus type 2 in obese  Patient reports increased social stress with his grandmother, who lives with them, breaking her hip, along with opening a new campaign to legally be able to continue to have visitation with his 14-year-old daughter. As result patient reports food choices have faltered somewhat and he is noted to persistently high sugars and 300-500 range over the past several weeks. Is not noted any low blood sugars characterized by headache or diaphoresis. Updated A1c did return at 9.5 previously he had been about 2 points lower than this. Patient is compliant in taking his metformin 1000 mg twice daily. Although patient is not reporting any dysuria or hematuria. Microvena and creatinine ratio (quest) has risen to 410.    Morbid obesity  Patient not reporting on  Her loss or cold intolerance. Patient would like to try medication assistance to pursue more consistent weight loss.He has never used phentermine.    HTN (hypertension)  Patient has not been checking home blood pressures. He has been compliant taking metoprolol and lisinopril. Not reporting palpitations or chest pressures. Does report very brief several second random chest pains unrelated to effort, chronic.      Patient Active Problem List    Diagnosis Date Noted   • Acute pain of both shoulders 09/07/2017   • Atypical chest pain 06/30/2017   • Alcohol abuse, episodic 01/19/2017   • Acute right flank pain 01/19/2017   • Left sided sciatica 01/03/2017   • Cellulitis of extremity 01/03/2017   • Otalgia of both ears 08/30/2016   • Sleep apnea in adult 06/06/2016   • Anxiety 01/26/2016   • Morbid obesity (CMS-[facility]) 09/22/2015   • Tobacco abuse counseling 08/27/2015   • GERD (gastroesophageal reflux  disease) 04/06/2015   • Hypertriglyceridemia 01/05/2015   • Diabetes mellitus type 2 in obese (CMS-HCC) 12/22/2014   • HTN (hypertension) 12/10/2014   • Chest pain 12/10/2014     Social history-, working  Allergies:Review of patient's allergies indicates no known allergies.    Current Outpatient Prescriptions   Medication Sig Dispense Refill   • pioglitazone (ACTOS) 45 MG Tab Take 1 Tab by mouth every day. 30 Tab 11   • benzonatate (TESSALON) 100 MG Cap Take 1 Cap by mouth 3 times a day as needed for Cough. 60 Cap 0   • Blood Glucose Monitoring Suppl Supplies Misc True Metrix meter. Compatible test strips, use 1 X/day number 30 Lancets use 1 X/day number 30 30 Mutually Defined 11   • gabapentin (NEURONTIN) 300 MG Cap Take 1 Cap by mouth 3 times a day. 90 Cap 6   • metoprolol SR (TOPROL XL) 50 MG TABLET SR 24 HR Take 1 Tab by mouth every day. TAKE 1 TAB BY MOUTH EVERY DAY. 30 Tab 9   • ranitidine (ZANTAC) 300 MG tablet TAKE 1 TAB BY MOUTH EVERY DAY. 30 Tab 6   • sertraline (ZOLOFT) 100 MG Tab Take 1 Tab by mouth every day. 30 Tab 6   • metformin (GLUCOPHAGE) 500 MG Tab Take 2 Tabs by mouth 2 times a day, with meals. 120 Tab 11   • lisinopril (PRINIVIL) 10 MG Tab TAKE 1 TABLET BY MOUTH EVERY DAY 30 Tab 10   • Omega-3 Fatty Acids (FISH OIL) 1000 MG Cap capsule Take 1,000 mg by mouth every day.     • B Complex-C (SUPER B COMPLEX PO) Take 1 Tab by mouth every day.     • multivitamin (THERAGRAN) Tab Take 1 Tab by mouth every day.     • POTASSIUM PO Take 1 Tab by mouth every day. Indications: OTC     • simvastatin (ZOCOR) 10 MG Tab Take 1 Tab by mouth every evening. 30 Tab 11   • aspirin EC (ECOTRIN) 81 MG TBEC Take 81 mg by mouth every day.       No current facility-administered medications for this visit.        Social History   Substance Use Topics   • Smoking status: Current Every Day Smoker     Packs/day: 1.50     Years: 21.00     Types: Cigarettes   • Smokeless tobacco: Never Used   • Alcohol use 0.0 oz/week  "     Comment: 3 to 4 times weekly       Family History   Problem Relation Age of Onset   • Diabetes Mother    • Stroke Father    • Alcohol/Drug Maternal Grandfather    • Diabetes Paternal Grandfather    • Cancer Paternal Grandfather      lung   • Heart Disease Neg Hx        ROS:  Review of Systems   Constitutional: Negative for fever, chills, weight loss and malaise/fatigue.   Eyes: Negative for blurred vision.   Respiratory: Negative for cough, sputum production, shortness of breath and wheezing.    Cardiovascular: Negative for chest pain, palpitations, orthopnea and leg swelling.   Gastrointestinal: Negative for heartburn, nausea, vomiting and abdominal pain.    Endo/Heme/Allergies: Does not bruise/bleed easily.                Exam:  Blood pressure 124/82, pulse 100, temperature 35.9 °C (96.6 °F), resp. rate 16, height 1.88 m (6' 2.02\"), weight (!) 165.1 kg (364 lb), SpO2 92 %.  General:  Well nourished, well developed male in NAD  Head is grossly normal.  Neck: Supple without JVD or bruit. Thyroid is not enlarged. Trachea is midline.  Pulmonary: Clear to ausculation .  Normal effort. No rales, ronchi, or wheezing.  Cardiovascular: Regular rate and rhythm without murmur.  Abdomen-Abdomen is soft, normal bowel sounds, no masses, guarding, ororganomegaly, or tenderness.  Lower extremities- neg for pretibial edema, redness, tenderness.      Please note that this dictation was created using voice recognition software. I have made every reasonable attempt to correct obvious errors, but I expect that there are errors of grammar and possibly content that I did not discover before finalizing the note.    Assessment/Plan:  1. Diabetes mellitus type 2 in obese (CMS-HCC)     2. Essential hypertension     3. Morbid obesity (CMS-[facility])  Patient identified as having weight management issue.  Appropriate orders and counseling given.      Plan: 1. Importance of some type of relaxation/\"recess\" was discussed today  2. Add Actos 45 " mg once daily to metformin  3. Trial of phentermine 37.5 mg once daily to assist with smaller portions and gradual weight loss  4. Revisit in one month

## 2017-10-05 NOTE — ASSESSMENT & PLAN NOTE
Patient has not been checking home blood pressures. He has been compliant taking metoprolol and lisinopril. Not reporting palpitations or chest pressures. Does report very brief several second random chest pains unrelated to effort, chronic.

## 2017-10-05 NOTE — ASSESSMENT & PLAN NOTE
Patient not reporting on  Her loss or cold intolerance. Patient would like to try medication assistance to pursue more consistent weight loss.He has never used phentermine.

## 2017-11-06 ENCOUNTER — OFFICE VISIT (OUTPATIENT)
Dept: MEDICAL GROUP | Facility: MEDICAL CENTER | Age: 37
End: 2017-11-06
Attending: FAMILY MEDICINE
Payer: COMMERCIAL

## 2017-11-06 VITALS
BODY MASS INDEX: 40.43 KG/M2 | WEIGHT: 315 LBS | OXYGEN SATURATION: 95 % | RESPIRATION RATE: 16 BRPM | TEMPERATURE: 98.2 F | HEART RATE: 104 BPM | HEIGHT: 74 IN | SYSTOLIC BLOOD PRESSURE: 128 MMHG | DIASTOLIC BLOOD PRESSURE: 90 MMHG

## 2017-11-06 DIAGNOSIS — E66.01 MORBID OBESITY (HCC): ICD-10-CM

## 2017-11-06 DIAGNOSIS — E66.9 DIABETES MELLITUS TYPE 2 IN OBESE (HCC): ICD-10-CM

## 2017-11-06 DIAGNOSIS — Z71.6 TOBACCO ABUSE COUNSELING: ICD-10-CM

## 2017-11-06 DIAGNOSIS — E11.69 DIABETES MELLITUS TYPE 2 IN OBESE (HCC): ICD-10-CM

## 2017-11-06 PROCEDURE — 99213 OFFICE O/P EST LOW 20 MIN: CPT | Performed by: FAMILY MEDICINE

## 2017-11-06 PROCEDURE — 99214 OFFICE O/P EST MOD 30 MIN: CPT | Performed by: FAMILY MEDICINE

## 2017-11-06 RX ORDER — PHENTERMINE HYDROCHLORIDE 37.5 MG/1
37.5 TABLET ORAL
Qty: 30 TAB | Refills: 0 | Status: SHIPPED | OUTPATIENT
Start: 2017-11-06 | End: 2017-12-05

## 2017-11-06 ASSESSMENT — PAIN SCALES - GENERAL: PAINLEVEL: NO PAIN

## 2017-11-06 NOTE — ASSESSMENT & PLAN NOTE
Patient ports he continues to smoke close to his average of a pack and half cigarettes per day. He does have a daily cough and daily sputum production. Has not noted any hemoptysis. He noted the Chantix appears to be available on his new silver somewhat formulary and he would love to try that.

## 2017-11-06 NOTE — ASSESSMENT & PLAN NOTE
Patient has been mostly successful over the past month limiting portions. He has tolerated taking phentermine without difficulty. It turns out that socially he does been a very rough month with the death of  adopted mom about 2 weeks ago and then the unexpected death of his brother 4 days ago. Negative for hair loss, cold intolerance

## 2017-11-06 NOTE — ASSESSMENT & PLAN NOTE
Recent blood sugars have ranged from 162-72 typically. Patient has not been able to check remaining before breakfast sugars. Has been compliant with medication. Denies any low blood sugars characterized by headache or diaphoresis

## 2017-11-06 NOTE — PROGRESS NOTES
Chief Complaint   Patient presents with   • Follow-Up       HISTORY OF PRESENT ILLNESS: Patient is a 37 y.o. male established patient who presents today to   Follow-up on tobacco abuse counseling, morbid obesity, type 2 diabetes mellitus.      Tobacco abuse counseling  Patient ports he continues to smoke close to his average of a pack and half cigarettes per day. He does have a daily cough and daily sputum production. Has not noted any hemoptysis. He noted the Chantix appears to be available on his new silver somewhat formulary and he would love to try that.    Morbid obesity  Patient has been mostly successful over the past month limiting portions. He has tolerated taking phentermine without difficulty. It turns out that socially he does been a very rough month with the death of  adopted mom about 2 weeks ago and then the unexpected death of his brother 4 days ago. Negative for hair loss, cold intolerance    Diabetes mellitus type 2 in obese  Recent blood sugars have ranged from 162-72 typically. Patient has not been able to check remaining before breakfast sugars. Has been compliant with medication. Denies any low blood sugars characterized by headache or diaphoresis      Patient Active Problem List    Diagnosis Date Noted   • Acute pain of both shoulders 09/07/2017   • Atypical chest pain 06/30/2017   • Alcohol abuse, episodic 01/19/2017   • Acute right flank pain 01/19/2017   • Left sided sciatica 01/03/2017   • Cellulitis of extremity 01/03/2017   • Otalgia of both ears 08/30/2016   • Sleep apnea in adult 06/06/2016   • Anxiety 01/26/2016   • Morbid obesity (CMS-HCC) 09/22/2015   • Tobacco abuse counseling 08/27/2015   • GERD (gastroesophageal reflux disease) 04/06/2015   • Hypertriglyceridemia 01/05/2015   • Diabetes mellitus type 2 in obese (CMS-HCC) 12/22/2014   • HTN (hypertension) 12/10/2014   • Chest pain 12/10/2014     Social history-, working  Allergies:Review of patient's allergies indicates no  known allergies.    Current Outpatient Prescriptions   Medication Sig Dispense Refill   • pioglitazone (ACTOS) 45 MG Tab Take 1 Tab by mouth every day. 30 Tab 11   • phentermine 37.5 MG capsule Take 1 Cap by mouth every morning. 30 Cap 0   • benzonatate (TESSALON) 100 MG Cap Take 1 Cap by mouth 3 times a day as needed for Cough. 60 Cap 0   • Blood Glucose Monitoring Suppl Supplies Misc True Metrix meter. Compatible test strips, use 1 X/day number 30 Lancets use 1 X/day number 30 30 Mutually Defined 11   • gabapentin (NEURONTIN) 300 MG Cap Take 1 Cap by mouth 3 times a day. 90 Cap 6   • metoprolol SR (TOPROL XL) 50 MG TABLET SR 24 HR Take 1 Tab by mouth every day. TAKE 1 TAB BY MOUTH EVERY DAY. 30 Tab 9   • ranitidine (ZANTAC) 300 MG tablet TAKE 1 TAB BY MOUTH EVERY DAY. 30 Tab 6   • sertraline (ZOLOFT) 100 MG Tab Take 1 Tab by mouth every day. 30 Tab 6   • metformin (GLUCOPHAGE) 500 MG Tab Take 2 Tabs by mouth 2 times a day, with meals. 120 Tab 11   • lisinopril (PRINIVIL) 10 MG Tab TAKE 1 TABLET BY MOUTH EVERY DAY 30 Tab 10   • Omega-3 Fatty Acids (FISH OIL) 1000 MG Cap capsule Take 1,000 mg by mouth every day.     • B Complex-C (SUPER B COMPLEX PO) Take 1 Tab by mouth every day.     • multivitamin (THERAGRAN) Tab Take 1 Tab by mouth every day.     • POTASSIUM PO Take 1 Tab by mouth every day. Indications: OTC     • simvastatin (ZOCOR) 10 MG Tab Take 1 Tab by mouth every evening. 30 Tab 11   • aspirin EC (ECOTRIN) 81 MG TBEC Take 81 mg by mouth every day.       No current facility-administered medications for this visit.        Social History   Substance Use Topics   • Smoking status: Current Every Day Smoker     Packs/day: 1.50     Years: 21.00     Types: Cigarettes   • Smokeless tobacco: Never Used   • Alcohol use 0.0 oz/week      Comment: 3 to 4 times weekly       Family History   Problem Relation Age of Onset   • Diabetes Mother    • Stroke Father    • Alcohol/Drug Maternal Grandfather    • Diabetes Paternal  "Grandfather    • Cancer Paternal Grandfather      lung   • Heart Disease Neg Hx        ROS:  Review of Systems   Constitutional: Negative for fever, chills, weight loss and malaise/fatigue.   Eyes: Negative for blurred vision.   Respiratory: Negative for cough, sputum production, shortness of breath and wheezing.    Cardiovascular: Negative for chest pain, palpitations, orthopnea and leg swelling.   Gastrointestinal: Negative for heartburn, nausea, vomiting and abdominal pain.   Endo/Heme/Allergies: Does not bruise/bleed easily.               Exam:  Blood pressure 128/90, pulse (!) 104, temperature 36.8 °C (98.2 °F), resp. rate 16, height 1.88 m (6' 2.02\"), weight (!) 161.5 kg (356 lb), SpO2 95 %.  General:  Well nourished, well developed male in NAD  Head is grossly normal.  Neck: Supple without JVD or bruit. Thyroid is not enlarged. Trachea is midline.  Pulmonary: Clear to ausculation .  Normal effort. No rales, ronchi, or wheezing.  Cardiovascular: Regular rate and rhythm without murmur.  Abdomen-Abdomen is soft, normal bowel sounds, no masses, guarding, ororganomegaly, or tenderness.  Lower extremities- neg for pretibial edema, redness, tenderness.   Psych-normal affect with good eye contact. Normal grooming. Oriented x4.      Please note that this dictation was created using voice recognition software. I have made every reasonable attempt to correct obvious errors, but I expect that there are errors of grammar and possibly content that I did not discover before finalizing the note.    Assessment/Plan:  1. Tobacco abuse counseling     2. Diabetes mellitus type 2 in obese (CMS-[facility])     3. Morbid obesity (CMS-[facility])        Plan: 1. Continue phentermine 37.5 mg daily-monitor diastolic blood pressure  2. Revisit in one month  3. Trial of Chantix starter pack  "

## 2017-11-10 ENCOUNTER — HOSPITAL ENCOUNTER (EMERGENCY)
Facility: MEDICAL CENTER | Age: 37
End: 2017-11-10
Attending: EMERGENCY MEDICINE
Payer: COMMERCIAL

## 2017-11-10 VITALS
HEART RATE: 98 BPM | WEIGHT: 315 LBS | RESPIRATION RATE: 18 BRPM | HEIGHT: 74 IN | SYSTOLIC BLOOD PRESSURE: 172 MMHG | DIASTOLIC BLOOD PRESSURE: 94 MMHG | OXYGEN SATURATION: 95 % | TEMPERATURE: 99 F | BODY MASS INDEX: 40.43 KG/M2

## 2017-11-10 DIAGNOSIS — L02.91 ABSCESS: ICD-10-CM

## 2017-11-10 PROCEDURE — 87070 CULTURE OTHR SPECIMN AEROBIC: CPT

## 2017-11-10 PROCEDURE — 700111 HCHG RX REV CODE 636 W/ 250 OVERRIDE (IP): Performed by: EMERGENCY MEDICINE

## 2017-11-10 PROCEDURE — 90715 TDAP VACCINE 7 YRS/> IM: CPT | Performed by: EMERGENCY MEDICINE

## 2017-11-10 PROCEDURE — 99283 EMERGENCY DEPT VISIT LOW MDM: CPT

## 2017-11-10 PROCEDURE — 303977 HCHG I & D

## 2017-11-10 PROCEDURE — 90471 IMMUNIZATION ADMIN: CPT

## 2017-11-10 RX ADMIN — CLOSTRIDIUM TETANI TOXOID ANTIGEN (FORMALDEHYDE INACTIVATED), CORYNEBACTERIUM DIPHTHERIAE TOXOID ANTIGEN (FORMALDEHYDE INACTIVATED), BORDETELLA PERTUSSIS TOXOID ANTIGEN (GLUTARALDEHYDE INACTIVATED), BORDETELLA PERTUSSIS FILAMENTOUS HEMAGGLUTININ ANTIGEN (FORMALDEHYDE INACTIVATED), BORDETELLA PERTUSSIS PERTACTIN ANTIGEN, AND BORDETELLA PERTUSSIS FIMBRIAE 2/3 ANTIGEN 0.5 ML: 5; 2; 2.5; 5; 3; 5 INJECTION, SUSPENSION INTRAMUSCULAR at 08:26

## 2017-11-10 ASSESSMENT — PAIN SCALES - GENERAL: PAINLEVEL_OUTOF10: 4

## 2017-11-10 NOTE — ED NOTES
D/c pt home,  . Pt aware of f/u instructions , aware to return for any changes or concerns. No further questions upon d/c home from ed

## 2017-11-10 NOTE — ED PROVIDER NOTES
"ED Provider Note    CHIEF COMPLAINT  Chief Complaint   Patient presents with   • Abscess       HPI  Luis Alfredo Casimiro Javed is a 37 y.o. male who presents with tender swollen area onHis left inner thigh. Started a few days ago. Gradually getting worse. Throbbing nonradiating pain from the area worse when he touches it. Blood sugars have been well-controlled. He's had many abscesses in the past.      REVIEW OF SYSTEMS  See HPI    PAST MEDICAL HISTORY  Past Medical History:   Diagnosis Date   • Chest pain     x 5 years.   • Hypertension    • Type II or unspecified type diabetes mellitus without mention of complication, not stated as uncontrolled        SOCIAL HISTORY  Social History     Social History   • Marital status:      Spouse name: N/A   • Number of children: N/A   • Years of education: N/A     Social History Main Topics   • Smoking status: Current Every Day Smoker     Packs/day: 1.50     Years: 21.00     Types: Cigarettes   • Smokeless tobacco: Never Used   • Alcohol use 0.0 oz/week      Comment: 3 to 4 times weekly   • Drug use: No      Comment: quit IV meth 2005   • Sexual activity: Not on file     Other Topics Concern   • Not on file     Social History Narrative   • No narrative on file       SURGICAL HISTORY  Past Surgical History:   Procedure Laterality Date   • HERNIA REPAIR  age 4   • STRABISMUS REPAIR  age 5       CURRENT MEDICATIONS  Home Medications    **Home medications have not yet been reviewed for this encounter**           ALLERGIES  No Known Allergies    PHYSICAL EXAM  VITAL SIGNS: BP (!) 172/94   Pulse 98   Temp 37.2 °C (99 °F)   Resp 18   Ht 1.88 m (6' 2\")   Wt (!) 158 kg (348 lb 5.2 oz)   SpO2 95%   BMI 44.72 kg/m²   Constitutional: Well developed, Well nourished, No acute distress  HENT:  Atraumatic, Normocephalic  Eyes: sclera white, No discharge.   Neck: Normal range of motion  Lymphatic: No lymphadenopathy noted.   Cardiovascular: Normal heart rate  Thorax & Lungs: No " respiratory distress  Skin:Tender indurated abscess left inner thigh with mild surrounding cellulitis  Psychiatric: Affect normal    Labs:  Wound culture is obtained    Incision and Drainage Procedure Note    Indication: Abscess    Procedure: The patient was positioned appropriately and the skin over the incision site was prepped with betadine and draped in a sterile fashion. Local anesthesia was obtained by infiltration using 1% Lidocaine with epinephrine.  An incision was then made over the apex of the lesion then foul smelling and purulent material was expressed. Loculations were broken up using a hemostat and more of the material was able to be expressed. The drainage cavity was then irrigated and packed with sterile gauze. The patient’s tetanus status was updated with a tetanus booster.    The patient tolerated the procedure well.    Complications: None    COURSE & MEDICAL DECISION MAKING  Abscess drained. Patient nontoxic. Patient should do well with outpatient treatment. Patient instructed to change dressing daily, keep clean and allow drainage. Patient has a prescription for Keflex and Bactrim. He reports he has a full course. I've advised him to complete this course. Patient should return to the emergency department in 2-3 days for recheck or follow up with primary physician. Patient should return sooner for high fevers, expanding redness, worsening, not improving or concern.    FINAL IMPRESSION  1. subcutaneous abscess  2. Incision and drainage of abscess    Disposition: Home    Please note that this dictation was created using voice recognition software. I have worked with consultants from the vendor as well as technical experts from [facility] to optimize the interface. I have made every reasonable attempt to correct obvious errors, but I expect that there are errors of grammar and possibly content that I did not discover before finalizing the note.      Electronically signed by: Nicho Ann,  11/10/2017 8:20 AM

## 2017-11-11 ENCOUNTER — PATIENT OUTREACH (OUTPATIENT)
Dept: HEALTH INFORMATION MANAGEMENT | Facility: OTHER | Age: 37
End: 2017-11-11

## 2017-11-13 LAB
BACTERIA WND AEROBE CULT: ABNORMAL
BACTERIA WND AEROBE CULT: ABNORMAL
SIGNIFICANT IND 70042: ABNORMAL
SITE SITE: ABNORMAL
SOURCE SOURCE: ABNORMAL

## 2017-11-15 NOTE — ED NOTES
ED Positive Culture Follow-up/Notification Note:    Date: 11/15/17     Patient seen in the ED on 11/10/2017 for abscess.  1. Abscess       Discharge Medication List as of 11/10/2017  8:29 AM          Allergies: Patient has no known allergies.     Final cultures:   Results     Procedure Component Value Units Date/Time    CULTURE WOUND W/O GRAM STAIN [[id number]]  (Abnormal) Collected:  11/10/17 0820    Order Status:  Completed Specimen:  Wound from Abscess Updated:  11/13/17 1255     Significant Indicator POS (POS)     Source WND     Site Left Thigh Abscess     Culture Result Wound -- (A)     Growth noted after further incubation,see below for  organism identification.  Moderate growth Mixed skin migel.       Culture Result Wound -- (A)     Propionibacterium species  Moderate growth  A component of mixed skin migel            Plan:   I&D conducted in the ED, TdaP updated  -Cultured organism is sensitive to cephalothin in vitro Tarun et al . Cephalothin sensitivities can be used as a surrogate for cephalexin.    -Contacted pt to assess his clinical status. He stated all symptoms resolved since his presentation. While I was informing pt of his culture results and recommending DC of TMP/SMX, Pt hung up on me.   -No further follow up indicated at this time.    Melissa Lomax, PharmD, BCPS

## 2017-11-16 ENCOUNTER — PATIENT MESSAGE (OUTPATIENT)
Dept: MEDICAL GROUP | Facility: MEDICAL CENTER | Age: 37
End: 2017-11-16

## 2017-11-30 ENCOUNTER — HOSPITAL ENCOUNTER (EMERGENCY)
Facility: MEDICAL CENTER | Age: 37
End: 2017-11-30
Attending: EMERGENCY MEDICINE
Payer: COMMERCIAL

## 2017-11-30 VITALS
OXYGEN SATURATION: 95 % | WEIGHT: 315 LBS | HEIGHT: 74 IN | BODY MASS INDEX: 40.43 KG/M2 | TEMPERATURE: 96.9 F | SYSTOLIC BLOOD PRESSURE: 131 MMHG | RESPIRATION RATE: 16 BRPM | DIASTOLIC BLOOD PRESSURE: 100 MMHG | HEART RATE: 85 BPM

## 2017-11-30 DIAGNOSIS — H73.012 BULLOUS MYRINGITIS OF LEFT EAR: ICD-10-CM

## 2017-11-30 PROCEDURE — 700102 HCHG RX REV CODE 250 W/ 637 OVERRIDE(OP): Performed by: EMERGENCY MEDICINE

## 2017-11-30 PROCEDURE — 99283 EMERGENCY DEPT VISIT LOW MDM: CPT

## 2017-11-30 PROCEDURE — A9270 NON-COVERED ITEM OR SERVICE: HCPCS | Performed by: EMERGENCY MEDICINE

## 2017-11-30 RX ORDER — HYDROCODONE BITARTRATE AND ACETAMINOPHEN 7.5; 325 MG/1; MG/1
1 TABLET ORAL EVERY 6 HOURS PRN
Qty: 20 TAB | Refills: 0 | Status: SHIPPED | OUTPATIENT
Start: 2017-11-30 | End: 2018-01-05

## 2017-11-30 RX ORDER — HYDROCODONE BITARTRATE AND ACETAMINOPHEN 5; 325 MG/1; MG/1
1 TABLET ORAL ONCE
Status: COMPLETED | OUTPATIENT
Start: 2017-11-30 | End: 2017-11-30

## 2017-11-30 RX ORDER — AMOXICILLIN AND CLAVULANATE POTASSIUM 875; 125 MG/1; MG/1
1 TABLET, FILM COATED ORAL 2 TIMES DAILY
Qty: 20 TAB | Refills: 0 | Status: SHIPPED | OUTPATIENT
Start: 2017-11-30 | End: 2017-12-10

## 2017-11-30 RX ORDER — AZITHROMYCIN 250 MG/1
TABLET, FILM COATED ORAL
Qty: 6 TAB | Refills: 0 | Status: SHIPPED | OUTPATIENT
Start: 2017-11-30 | End: 2018-02-05

## 2017-11-30 RX ADMIN — HYDROCODONE BITARTRATE AND ACETAMINOPHEN 1 TABLET: 5; 325 TABLET ORAL at 07:52

## 2017-11-30 ASSESSMENT — PAIN SCALES - GENERAL
PAINLEVEL_OUTOF10: 8
PAINLEVEL_OUTOF10: 8

## 2017-11-30 NOTE — ED PROVIDER NOTES
ED Provider Note    CHIEF COMPLAINT  Chief Complaint   Patient presents with   • Ear Pain     left sided ear pain started last night  has had cold 2 weeks ago       HPI  Luis Alfredo Javed is a 37 y.o. male who presents with a painful left ear. This started last night. Patient states that he had an upper respiratory infection 2 weeks ago. He denies any productive cough. He does have some congestion. He only has pain in his left ear with some mild hearing loss. He denies any neck stiffness, fevers or chills, nausea or vomiting. He is diabetic.    REVIEW OF SYSTEMS  HEENT:  No ear pain, congestion or sore throat   EYES: no discharge redness or vision changes  CARDIAC: no chest pain, palpitations    PULMONARY: no dyspnea, cough or congestion   Endocrine: no fevers, sweating, weight loss   SKIN: no rash, erythema or contusions     See history of present illness all other systems are negative      PAST MEDICAL HISTORY  Past Medical History:   Diagnosis Date   • Chest pain     x 5 years.   • Hypertension    • Type II or unspecified type diabetes mellitus without mention of complication, not stated as uncontrolled        FAMILY HISTORY  Family History   Problem Relation Age of Onset   • Diabetes Mother    • Stroke Father    • Alcohol/Drug Maternal Grandfather    • Diabetes Paternal Grandfather    • Cancer Paternal Grandfather      lung   • Heart Disease Neg Hx        SOCIAL HISTORY  Social History     Social History   • Marital status:      Spouse name: N/A   • Number of children: N/A   • Years of education: N/A     Social History Main Topics   • Smoking status: Current Every Day Smoker     Packs/day: 1.50     Years: 21.00     Types: Cigarettes   • Smokeless tobacco: Never Used   • Alcohol use 0.0 oz/week      Comment: 3 to 4 times weekly   • Drug use: No      Comment: 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   • Sexual activity: Not on file     Other Topics Concern   • Not on file     Social History Narrative   • No narrative  "on file       SURGICAL HISTORY  Past Surgical History:   Procedure Laterality Date   • HERNIA REPAIR  age 4   • STRABISMUS REPAIR  age 5       CURRENT MEDICATIONS  Home Medications     Reviewed by Viki Gonzales (Pharmacy Tech) on 11/30/17 at 0748  Med List Status: Complete   Medication Last Dose Status   aspirin EC (ECOTRIN) 81 MG TBEC 11/29/2017 Active   B Complex-C (SUPER B COMPLEX PO) 11/29/2017 Active   gabapentin (NEURONTIN) 300 MG Cap 11/29/2017 Active   lisinopril (PRINIVIL) 10 MG Tab 11/29/2017 Active   metformin (GLUCOPHAGE) 500 MG Tab 11/29/2017 Active   metoprolol SR (TOPROL XL) 50 MG TABLET SR 24 HR 11/29/2017 Active   multivitamin (THERAGRAN) Tab 11/29/2017 Active   Omega-3 Fatty Acids (FISH OIL) 1000 MG Cap capsule 11/29/2017 Active   phentermine (ADIPEX-P) 37.5 MG tablet 11/29/2017 Active   pioglitazone (ACTOS) 45 MG Tab 11/29/2017 Active   ranitidine (ZANTAC) 300 MG tablet 11/29/2017 Active   sertraline (ZOLOFT) 100 MG Tab 11/29/2017 Active   simvastatin (ZOCOR) 10 MG Tab 11/29/2017 Active   varenicline (CHANTIX STARTING MONTH PAK) 0.5 MG X 11 & 1 MG X 42 tablet 11/29/2017 Active                ALLERGIES  No Known Allergies    PHYSICAL EXAM  VITAL SIGNS: /100 Comment: Hx of, No Rx today  Pulse 85   Temp 36.1 °C (96.9 °F)   Resp 16   Ht 1.88 m (6' 2\")   Wt (!) 159.1 kg (350 lb 12 oz)   SpO2 95%   BMI 45.03 kg/m²  Room air O2: 95    Constitutional :  Well developed, Well nourished, No acute distress, Non-toxic appearance.   HENT:Mild rhinorrhea and mucosal edema. Posterior pharynx is erythematous with posterior nasopharyngeal drainage. Right TM is normal. Left TM is erythematous with blistering of the eardrum. The chin has no tenderness in the posterior part of the ear. No posterior auricular lymphadenopathy or swelling  Eyes: PERRLA, EOMI, Conjunctiva normal, No discharge.   Neck: Normal range of motion, No tenderness, Supple, No stridor.   Lymphatic: Positive anterior cervical " lymphadenopathy.   Cardiovascular: Normal heart rate, Normal rhythm, No murmurs, No rubs, No gallops.   Thorax & Lungs:  Normal breath sounds, No respiratory distress, No wheezing, No chest tenderness.   Skin: Warm, Dry, No erythema, No rash.   Extremities: Intact distal pulses, No edema, No tenderness, No cyanosis, No clubbing.         COURSE & MEDICAL DECISION MAKING  Pertinent Labs & Imaging studies reviewed. (See chart for details)  The patient has bullous myringitis. I explained him that this could be viral or bacterial. We will put him on azithromycin to treat atypical bacterial infections. I also give him Norco for pain.  is 180. He is to use over-the-counter decongestants and Motrin as well. The patient should return to the hospital if he has posterior auricular swelling, high fevers, neck stiffness or worsening symptoms.    Patient has had high blood pressure while in the emergency department, felt likely secondary to medical condition. Counseled patient to monitor blood pressure at home and follow up with primary care physician.    Nicho Tovar M.D.  [address] [place] [address] [phone]    Call in 1 day  for recheck    Current Outpatient Prescriptions   Medication Sig Dispense Refill   • hydrocodone-acetaminophen (NORCO) 7.5-325 MG per tablet Take 1 Tab by mouth every 6 hours as needed. 20 Tab 0   • azithromycin (ZITHROMAX) 250 MG Tab Take two tabs by mouth on day one, then one tab by mouth daily on days 2-5. 6 Tab 0   • varenicline (CHANTIX STARTING MONTH PAK) 0.5 MG X 11 & 1 MG X 42 tablet 1 by mouth twice a day 56 Tab 3   • phentermine (ADIPEX-P) 37.5 MG tablet Take 1 Tab by mouth every morning before breakfast. 30 Tab 0   • pioglitazone (ACTOS) 45 MG Tab Take 1 Tab by mouth every day. 30 Tab 11   • gabapentin (NEURONTIN) 300 MG Cap Take 1 Cap by mouth 3 times a day. 90 Cap 6   • metoprolol SR (TOPROL XL) 50 MG TABLET SR 24 HR Take 1 Tab by mouth every day. TAKE 1 TAB BY MOUTH  EVERY DAY. 30 Tab 9   • ranitidine (ZANTAC) 300 MG tablet TAKE 1 TAB BY MOUTH EVERY DAY. 30 Tab 6   • sertraline (ZOLOFT) 100 MG Tab Take 1 Tab by mouth every day. 30 Tab 6   • metformin (GLUCOPHAGE) 500 MG Tab Take 2 Tabs by mouth 2 times a day, with meals. 120 Tab 11   • lisinopril (PRINIVIL) 10 MG Tab TAKE 1 TABLET BY MOUTH EVERY DAY 30 Tab 10   • Omega-3 Fatty Acids (FISH OIL) 1000 MG Cap capsule Take 1,000 mg by mouth every day.     • B Complex-C (SUPER B COMPLEX PO) Take 1 Tab by mouth every day.     • multivitamin (THERAGRAN) Tab Take 1 Tab by mouth every day.     • simvastatin (ZOCOR) 10 MG Tab Take 1 Tab by mouth every evening. 30 Tab 11   • aspirin EC (ECOTRIN) 81 MG TBEC Take 81 mg by mouth every day.             FINAL IMPRESSION  1. Bullous myringitis of left ear            Electronically signed by: Tammy Boateng, 11/30/2017

## 2017-11-30 NOTE — DISCHARGE INSTRUCTIONS
Bullous Myringitis  You have an infection of the ear drum called myringitis. Bullous means blisters have formed. These can produce clear or slightly bloody ear drainage. This infection can be caused by both viruses and germs (bacteria). Symptoms of myringitis include severe earache, slight hearing loss, and clear or bloody drainage from the ear. It is different from most ear infections in that there is no fluid trapped behind the ear drum.  Treatment often includes antibiotic ear drops and pain medicine. Sometimes an oral antibiotic may be prescribed. Until the infection is resolved, you should keep water from entering your ear by plugging it with a cotton ball covered with petroleum jelly when you shower.   SEEK MEDICAL CARE IF:  · You develop a cough or other symptoms of a respiratory infection.  · Your symptoms are not improving after 2 days of treatment.  · You develop a severe headache or stiff neck.  Document Released: 01/25/2006 Document Revised: 03/11/2013 Document Reviewed: 12/15/2009  [employer]® Patient Information ©2014 [employer].

## 2017-12-05 ENCOUNTER — OFFICE VISIT (OUTPATIENT)
Dept: MEDICAL GROUP | Facility: MEDICAL CENTER | Age: 37
End: 2017-12-05
Attending: FAMILY MEDICINE
Payer: COMMERCIAL

## 2017-12-05 VITALS
SYSTOLIC BLOOD PRESSURE: 118 MMHG | HEIGHT: 74 IN | WEIGHT: 315 LBS | OXYGEN SATURATION: 94 % | TEMPERATURE: 97.2 F | DIASTOLIC BLOOD PRESSURE: 72 MMHG | BODY MASS INDEX: 40.43 KG/M2 | HEART RATE: 96 BPM | RESPIRATION RATE: 16 BRPM

## 2017-12-05 DIAGNOSIS — H66.90 ACUTE OTITIS MEDIA, UNSPECIFIED OTITIS MEDIA TYPE: ICD-10-CM

## 2017-12-05 DIAGNOSIS — E11.69 DIABETES MELLITUS TYPE 2 IN OBESE (HCC): ICD-10-CM

## 2017-12-05 DIAGNOSIS — E66.01 MORBID OBESITY (HCC): ICD-10-CM

## 2017-12-05 DIAGNOSIS — E66.9 DIABETES MELLITUS TYPE 2 IN OBESE (HCC): ICD-10-CM

## 2017-12-05 DIAGNOSIS — Z71.6 TOBACCO ABUSE COUNSELING: ICD-10-CM

## 2017-12-05 PROCEDURE — 99213 OFFICE O/P EST LOW 20 MIN: CPT | Performed by: FAMILY MEDICINE

## 2017-12-05 PROCEDURE — 99214 OFFICE O/P EST MOD 30 MIN: CPT | Performed by: FAMILY MEDICINE

## 2017-12-05 RX ORDER — PHENTERMINE HYDROCHLORIDE 37.5 MG/1
37.5 CAPSULE ORAL EVERY MORNING
Qty: 30 CAP | Refills: 0 | Status: SHIPPED | OUTPATIENT
Start: 2017-12-05 | End: 2018-01-05 | Stop reason: SDUPTHER

## 2017-12-05 NOTE — PROGRESS NOTES
Chief Complaint   Patient presents with   • Diabetes       HISTORY OF PRESENT ILLNESS: Patient is a 37 y.o. male established patient who presents today toFollow-up on diabetes mellitus, morbid obesity, tobacco abuse        Diabetes mellitus type 2 in obese  Patient lilliana is compliant taking metformin 1000 mg twice daily and his 45 mg of pioglitazone once a day. Reports sugars taken somewhat intermittently are running from 1:30 to 170 in the morning or is more consistently 120-150 later in the afternoon. Not reporting any symptomatically blood sugars characterized by headache or diaphoresis. A1c ordered today.    Morbid obesity     Patient notes more effect with capsule of phentermine, than tablet. Has avoided alcohol x 6 weeks, limiting portions too. No palpitations. Current weight is down 10 pounds in the past month.    Tobacco Abuse  Patient reports that he started Chantix 2 weeks ago along with his wife. He reports he still smoking a full pack of cigarettes per day and has not noticed any obvious decline in his satisfaction associated with the cigarettes. Notes a daily cough and some sputum production, possibly impart with a left acute otitis media. Patient is on a course of Augmentin and Zithromax at this time.  Patient Active Problem List    Diagnosis Date Noted   • Acute pain of both shoulders 09/07/2017   • Atypical chest pain 06/30/2017   • Alcohol abuse, episodic 01/19/2017   • Acute right flank pain 01/19/2017   • Left sided sciatica 01/03/2017   • Cellulitis of extremity 01/03/2017   • Otalgia of both ears 08/30/2016   • Sleep apnea in adult 06/06/2016   • Anxiety 01/26/2016   • Morbid obesity (CMS-HCC) 09/22/2015   • Tobacco abuse counseling 08/27/2015   • GERD (gastroesophageal reflux disease) 04/06/2015   • Hypertriglyceridemia 01/05/2015   • Diabetes mellitus type 2 in obese (CMS-HCC) 12/22/2014   • HTN (hypertension) 12/10/2014   • Chest pain 12/10/2014       Allergies:Patient has no known  allergies.    Current Outpatient Prescriptions   Medication Sig Dispense Refill   • phentermine 37.5 MG capsule Take 1 Cap by mouth every morning. 30 Cap 0   • hydrocodone-acetaminophen (NORCO) 7.5-325 MG per tablet Take 1 Tab by mouth every 6 hours as needed. 20 Tab 0   • azithromycin (ZITHROMAX) 250 MG Tab Take two tabs by mouth on day one, then one tab by mouth daily on days 2-5. 6 Tab 0   • amoxicillin-clavulanate (AUGMENTIN) 875-125 MG Tab Take 1 Tab by mouth 2 times a day for 10 days. 20 Tab 0   • varenicline (CHANTIX STARTING MONTH PAK) 0.5 MG X 11 & 1 MG X 42 tablet 1 by mouth twice a day 56 Tab 3   • pioglitazone (ACTOS) 45 MG Tab Take 1 Tab by mouth every day. 30 Tab 11   • gabapentin (NEURONTIN) 300 MG Cap Take 1 Cap by mouth 3 times a day. 90 Cap 6   • metoprolol SR (TOPROL XL) 50 MG TABLET SR 24 HR Take 1 Tab by mouth every day. TAKE 1 TAB BY MOUTH EVERY DAY. 30 Tab 9   • ranitidine (ZANTAC) 300 MG tablet TAKE 1 TAB BY MOUTH EVERY DAY. 30 Tab 6   • sertraline (ZOLOFT) 100 MG Tab Take 1 Tab by mouth every day. 30 Tab 6   • metformin (GLUCOPHAGE) 500 MG Tab Take 2 Tabs by mouth 2 times a day, with meals. 120 Tab 11   • lisinopril (PRINIVIL) 10 MG Tab TAKE 1 TABLET BY MOUTH EVERY DAY 30 Tab 10   • Omega-3 Fatty Acids (FISH OIL) 1000 MG Cap capsule Take 1,000 mg by mouth every day.     • B Complex-C (SUPER B COMPLEX PO) Take 1 Tab by mouth every day.     • multivitamin (THERAGRAN) Tab Take 1 Tab by mouth every day.     • simvastatin (ZOCOR) 10 MG Tab Take 1 Tab by mouth every evening. 30 Tab 11   • aspirin EC (ECOTRIN) 81 MG TBEC Take 81 mg by mouth every day.       No current facility-administered medications for this visit.        Social History   Substance Use Topics   • Smoking status: Current Every Day Smoker     Packs/day: 1.50     Years: 21.00     Types: Cigarettes   • Smokeless tobacco: Never Used   • Alcohol use 0.0 oz/week      Comment: 3 to 4 times weekly       Family History   Problem Relation  "Age of Onset   • Diabetes Mother    • Stroke Father    • Alcohol/Drug Maternal Grandfather    • Diabetes Paternal Grandfather    • Cancer Paternal Grandfather      lung   • Heart Disease Neg Hx       Social history-, working  ROS:  Review of Systems   Constitutional: Negative for fever, chills, weight loss and malaise/fatigue.   Eyes: Negative for blurred vision.   Respiratory: Negative for cough, sputum production, shortness of breath and wheezing.    Cardiovascular: Negative for chest pain, palpitations, orthopnea and leg swelling.   Gastrointestinal: Negative for heartburn, nausea, vomiting and abdominal pain.   Musculoskeletal: Negative for myalgias, back pain and joint pain.   Endo/Heme/Allergies: Does not bruise/bleed easily.                Exam:  Blood pressure 118/72, pulse 96, temperature 36.2 °C (97.2 °F), resp. rate 16, height 1.88 m (6' 2.02\"), weight (!) 156.9 kg (346 lb), SpO2 94 %.  General:  Well nourished, well developed male in NAD  Head is grossly normal.  Neck: Supple without JVD or bruit. Thyroid is not enlarged. Trachea is midline.  Pulmonary: Clear to ausculation .  Normal effort. No rales, ronchi, or wheezing.  Cardiovascular: Regular rate and rhythm without murmur.  Abdomen-Abdomen is soft, normal bowel sounds, no masses, guarding, ororganomegaly, or tenderness.  Lower extremities- neg for pretibial edema, redness, tenderness.  Ears-dullness and mild erythema noted on the left eardrum with no perforation and no discharge. Right side is unremarkable    Please note that this dictation was created using voice recognition software. I have made every reasonable attempt to correct obvious errors, but I expect that there are errors of grammar and possibly content that I did not discover before finalizing the note.    Assessment/Plan:  1. Diabetes mellitus type 2 in obese (CMS-[facility])  HEMOGLOBIN A1C    LIPID PROFILE    MICROALBUMIN CREAT RATIO URINE   2. Acute otitis media, unspecified otitis " media type     3. Tobacco abuse counseling     4. Morbid obesity (CMS-[facility])       Plan: 1. Update labs  2. Rx Phentermine cap  3. Recheck 1 mo  4. Continue to reduce cigarettes, portionsTo continue further weight loss.

## 2017-12-05 NOTE — ASSESSMENT & PLAN NOTE
Patient lilliana is compliant taking metformin 1000 mg twice daily and his 45 mg of pioglitazone once a day. Reports sugars taken somewhat intermittently are running from 1:30 to 170 in the morning or is more consistently 120-150 later in the afternoon. Not reporting any symptomatically blood sugars characterized by headache or diaphoresis. A1c ordered today.

## 2017-12-05 NOTE — ASSESSMENT & PLAN NOTE
Patient notes more effect with capsule of phentermine, than tablet. Has avoided alcohol x 6 weeks, limiting portions too. No palpitations.

## 2017-12-12 ENCOUNTER — APPOINTMENT (OUTPATIENT)
Dept: PHYSICAL THERAPY | Facility: REHABILITATION | Age: 37
End: 2017-12-12
Attending: FAMILY MEDICINE
Payer: COMMERCIAL

## 2017-12-14 ENCOUNTER — APPOINTMENT (OUTPATIENT)
Dept: PHYSICAL THERAPY | Facility: REHABILITATION | Age: 37
End: 2017-12-14
Attending: FAMILY MEDICINE
Payer: COMMERCIAL

## 2017-12-19 ENCOUNTER — APPOINTMENT (OUTPATIENT)
Dept: PHYSICAL THERAPY | Facility: REHABILITATION | Age: 37
End: 2017-12-19
Attending: FAMILY MEDICINE
Payer: COMMERCIAL

## 2017-12-21 ENCOUNTER — APPOINTMENT (OUTPATIENT)
Dept: PHYSICAL THERAPY | Facility: REHABILITATION | Age: 37
End: 2017-12-21
Attending: FAMILY MEDICINE
Payer: COMMERCIAL

## 2017-12-26 ENCOUNTER — APPOINTMENT (OUTPATIENT)
Dept: PHYSICAL THERAPY | Facility: REHABILITATION | Age: 37
End: 2017-12-26
Attending: FAMILY MEDICINE
Payer: COMMERCIAL

## 2017-12-28 ENCOUNTER — APPOINTMENT (OUTPATIENT)
Dept: PHYSICAL THERAPY | Facility: REHABILITATION | Age: 37
End: 2017-12-28
Attending: FAMILY MEDICINE
Payer: COMMERCIAL

## 2018-01-03 ENCOUNTER — HOSPITAL ENCOUNTER (OUTPATIENT)
Dept: LAB | Facility: MEDICAL CENTER | Age: 38
End: 2018-01-03
Attending: FAMILY MEDICINE
Payer: COMMERCIAL

## 2018-01-03 DIAGNOSIS — E66.9 DIABETES MELLITUS TYPE 2 IN OBESE (HCC): ICD-10-CM

## 2018-01-03 DIAGNOSIS — E11.69 DIABETES MELLITUS TYPE 2 IN OBESE (HCC): ICD-10-CM

## 2018-01-03 LAB
CHOLEST SERPL-MCNC: 161 MG/DL (ref 100–199)
CREAT UR-MCNC: 239.8 MG/DL
EST. AVERAGE GLUCOSE BLD GHB EST-MCNC: 169 MG/DL
HBA1C MFR BLD: 7.5 % (ref 0–5.6)
HDLC SERPL-MCNC: 42 MG/DL
LDLC SERPL CALC-MCNC: 54 MG/DL
MICROALBUMIN UR-MCNC: 2.6 MG/DL
MICROALBUMIN/CREAT UR: 11 MG/G (ref 0–30)
TRIGL SERPL-MCNC: 324 MG/DL (ref 0–149)

## 2018-01-03 PROCEDURE — 83036 HEMOGLOBIN GLYCOSYLATED A1C: CPT

## 2018-01-03 PROCEDURE — 82043 UR ALBUMIN QUANTITATIVE: CPT

## 2018-01-03 PROCEDURE — 80061 LIPID PANEL: CPT

## 2018-01-03 PROCEDURE — 82570 ASSAY OF URINE CREATININE: CPT

## 2018-01-03 PROCEDURE — 36415 COLL VENOUS BLD VENIPUNCTURE: CPT

## 2018-01-05 ENCOUNTER — OFFICE VISIT (OUTPATIENT)
Dept: MEDICAL GROUP | Facility: MEDICAL CENTER | Age: 38
End: 2018-01-05
Attending: FAMILY MEDICINE
Payer: COMMERCIAL

## 2018-01-05 ENCOUNTER — TELEPHONE (OUTPATIENT)
Dept: MEDICAL GROUP | Facility: MEDICAL CENTER | Age: 38
End: 2018-01-05

## 2018-01-05 VITALS
SYSTOLIC BLOOD PRESSURE: 128 MMHG | HEART RATE: 100 BPM | DIASTOLIC BLOOD PRESSURE: 88 MMHG | TEMPERATURE: 96.9 F | OXYGEN SATURATION: 95 % | WEIGHT: 315 LBS | BODY MASS INDEX: 40.43 KG/M2 | HEIGHT: 74 IN | RESPIRATION RATE: 18 BRPM

## 2018-01-05 DIAGNOSIS — E66.01 MORBID OBESITY (HCC): ICD-10-CM

## 2018-01-05 DIAGNOSIS — Z71.6 TOBACCO ABUSE COUNSELING: ICD-10-CM

## 2018-01-05 DIAGNOSIS — E66.9 DIABETES MELLITUS TYPE 2 IN OBESE (HCC): ICD-10-CM

## 2018-01-05 DIAGNOSIS — E11.69 DIABETES MELLITUS TYPE 2 IN OBESE (HCC): ICD-10-CM

## 2018-01-05 PROCEDURE — 99214 OFFICE O/P EST MOD 30 MIN: CPT | Performed by: FAMILY MEDICINE

## 2018-01-05 PROCEDURE — 99213 OFFICE O/P EST LOW 20 MIN: CPT | Performed by: FAMILY MEDICINE

## 2018-01-05 RX ORDER — PHENTERMINE HYDROCHLORIDE 37.5 MG/1
37.5 CAPSULE ORAL EVERY MORNING
Qty: 30 CAP | Refills: 0 | Status: SHIPPED | OUTPATIENT
Start: 2018-01-05 | End: 2018-02-04

## 2018-01-05 RX ORDER — VARENICLINE TARTRATE 1 MG/1
1 TABLET, FILM COATED ORAL 2 TIMES DAILY
Qty: 60 TAB | Refills: 1 | Status: SHIPPED | OUTPATIENT
Start: 2018-01-05 | End: 2018-06-08

## 2018-01-05 NOTE — PROGRESS NOTES
Chief Complaint   Patient presents with   • Diabetes Mellitus       HISTORY OF PRESENT ILLNESS: Patient is a 37 y.o. male established patient who presents today toFollow-up on tobacco abuse counseling, morbid obesity, type 2 diabetes mellitus        Tobacco abuse counseling  Patient reports he is likely not finishing cigarettes but is sliding up about to 17 cigarettes per day which is a reduction. He is on Chantix 1 mg twice daily. Reporting current daily cough or sputum production.    Morbid obesity  Patient reports portion reduction has improved since he discontinued smoking marijuana recently. Not reporting any hair loss or cold intolerance. Is using phentermine daily. Current weight is down 6 pounds in the past month.    Diabetes mellitus type 2 in obese  Patient reports he has not been consistently checking sugars over most of the past month. One recent morning sugar was approximately 170. There've been no low blood sugars characterized by headache or diaphoresis. Recent A1c improved down to 7.5 from an October level of 9.5. Patient is compliant taking metformin, pioglitazone patient notes occasional brief burning type pains in the soles of his feet. Also notes generalized moderate decreased sensitivity on the soles of both feet. He discontinued gabapentin approximately 2 months ago. Notes no significant increase in foot discomfort off of that medication. He is trying to not take as many pills.      Patient Active Problem List    Diagnosis Date Noted   • Acute pain of both shoulders 09/07/2017   • Atypical chest pain 06/30/2017   • Alcohol abuse, episodic 01/19/2017   • Acute right flank pain 01/19/2017   • Left sided sciatica 01/03/2017   • Cellulitis of extremity 01/03/2017   • Otalgia of both ears 08/30/2016   • Sleep apnea in adult 06/06/2016   • Anxiety 01/26/2016   • Morbid obesity (CMS-[facility]) 09/22/2015   • Tobacco abuse counseling 08/27/2015   • GERD (gastroesophageal reflux disease) 04/06/2015   •  Hypertriglyceridemia 01/05/2015   • Diabetes mellitus type 2 in obese (CMS-HCC) 12/22/2014   • HTN (hypertension) 12/10/2014   • Chest pain 12/10/2014     Social Hx- , working  Allergies:Patient has no known allergies.    Current Outpatient Prescriptions   Medication Sig Dispense Refill   • varenicline (CHANTIX CONTINUING MONTH PAK) 1 MG tablet Take 1 Tab by mouth 2 times a day. 60 Tab 1   • phentermine 37.5 MG capsule Take 1 Cap by mouth every morning for 30 days. 30 Cap 0   • azithromycin (ZITHROMAX) 250 MG Tab Take two tabs by mouth on day one, then one tab by mouth daily on days 2-5. 6 Tab 0   • varenicline (CHANTIX STARTING MONTH PAK) 0.5 MG X 11 & 1 MG X 42 tablet 1 by mouth twice a day 56 Tab 3   • pioglitazone (ACTOS) 45 MG Tab Take 1 Tab by mouth every day. 30 Tab 11   • gabapentin (NEURONTIN) 300 MG Cap Take 1 Cap by mouth 3 times a day. 90 Cap 6   • metoprolol SR (TOPROL XL) 50 MG TABLET SR 24 HR Take 1 Tab by mouth every day. TAKE 1 TAB BY MOUTH EVERY DAY. 30 Tab 9   • ranitidine (ZANTAC) 300 MG tablet TAKE 1 TAB BY MOUTH EVERY DAY. 30 Tab 6   • sertraline (ZOLOFT) 100 MG Tab Take 1 Tab by mouth every day. 30 Tab 6   • metformin (GLUCOPHAGE) 500 MG Tab Take 2 Tabs by mouth 2 times a day, with meals. 120 Tab 11   • lisinopril (PRINIVIL) 10 MG Tab TAKE 1 TABLET BY MOUTH EVERY DAY 30 Tab 10   • Omega-3 Fatty Acids (FISH OIL) 1000 MG Cap capsule Take 1,000 mg by mouth every day.     • B Complex-C (SUPER B COMPLEX PO) Take 1 Tab by mouth every day.     • multivitamin (THERAGRAN) Tab Take 1 Tab by mouth every day.     • simvastatin (ZOCOR) 10 MG Tab Take 1 Tab by mouth every evening. 30 Tab 11   • aspirin EC (ECOTRIN) 81 MG TBEC Take 81 mg by mouth every day.       No current facility-administered medications for this visit.    Social history-, working  Social History   Substance Use Topics   • Smoking status: Current Every Day Smoker     Packs/day: 1.50     Years: 21.00     Types: Cigarettes   •  "Smokeless tobacco: Never Used   • Alcohol use 0.0 oz/week      Comment: 3 to 4 times weekly       Family History   Problem Relation Age of Onset   • Diabetes Mother    • Stroke Father    • Alcohol/Drug Maternal Grandfather    • Diabetes Paternal Grandfather    • Cancer Paternal Grandfather      lung   • Heart Disease Neg Hx        ROS:  Review of Systems   Constitutional: Negative for fever, chills, weight loss and malaise/fatigue.   Eyes: Negative for blurred vision.   Respiratory: Negative for cough, sputum production, shortness of breath and wheezing.    Cardiovascular: Negative for chest pain, palpitations, orthopnea and leg swelling.   Gastrointestinal: Negative for heartburn, nausea, vomiting and abdominal pain.   Endo/Heme/Allergies: Does not bruise/bleed easily.               Exam:  Blood pressure 128/88, pulse 100, temperature 36.1 °C (96.9 °F), resp. rate 18, height 1.88 m (6' 2.02\"), weight (!) 154.2 kg (340 lb), SpO2 95 %.  General:  Well nourished, well developed male in NAD  Head is grossly normal.  Neck: Supple without JVD or bruit. Thyroid is not enlarged. Trachea is midline.  Pulmonary: Clear to ausculation .  Normal effort. No rales, ronchi, or wheezing.  Cardiovascular: Regular rate and rhythm without murmur.  Abdomen-Abdomen is soft, normal bowel sounds, no masses, guarding, ororganomegaly, or tenderness.  Lower extremities- neg for pretibial edema, redness, tenderness.  Feet-with shoes and socks removed, feet show intact skin without redness, increased warmth, fissures, ulceration, swelling. Monofilament testing 3 out of 10 on the right foot, 4 out of 10 on the left foot      Please note that this dictation was created using voice recognition software. I have made every reasonable attempt to correct obvious errors, but I expect that there are errors of grammar and possibly content that I did not discover before finalizing the note.    Assessment/Plan:  1. Diabetes mellitus type 2 in obese " (CMS-HCC)     2. Tobacco abuse counseling     3. Morbid obesity (CMS-HCC)  phentermine 37.5 MG capsule      Plan: 1. Continue Chantix and encouraged further reduction hopefully leading to complete absence regarding cigarettes  2. Continue phentermine and portion reduction to achieve further weight loss  3. Patient is encouraged to redouble his efforts on portion control and limiting starches and avoiding sweets to control blood glucose  4. Revisit in one month to follow-up on weight loss

## 2018-01-05 NOTE — TELEPHONE ENCOUNTER
Pt received results in office visit 01/05/2018.     Cholesterol level remains good at 161 with an undesirable LDL cholesterol good at 54. Triglycerides again are elevated at 324-not severe enough to start the prescription. Dietary efforts here would be to avoid all sweets and limit starches which he should arguing to treat his diabetes. Minimal alcohol intake would also be helpful    Diabetes control has slipped somewhat with A1c rising to 7.5, in March was 6.5. In August 2016 it was in this same 7.5 range. Recommend redoubling efforts to limit portions, lose weight gradually, and avoid all sweets and limit starch intake.

## 2018-01-05 NOTE — TELEPHONE ENCOUNTER
----- Message from Nicho Tovar M.D. sent at 1/3/2018 12:58 PM PST -----  Diabetes control has slipped somewhat with A1c rising to 7.5, in March was 6.5. In August 2016 it was in this same 7.5 range. Recommend redoubling efforts to limit portions, lose weight gradually, and avoid all sweets and limit starch intake.

## 2018-01-05 NOTE — ASSESSMENT & PLAN NOTE
Patient reports he has not been consistently checking sugars over most of the past month. One recent morning sugar was approximately 170. There've been no low blood sugars characterized by headache or diaphoresis. Recent A1c improved down to 7.5 from an October level of 9.5. Patient is compliant taking metformin, pioglitazone

## 2018-01-05 NOTE — ASSESSMENT & PLAN NOTE
Patient reports portion reduction has improved since he discontinued smoking marijuana recently. Not reporting any hair loss or cold intolerance. Is using phentermine daily. Current weight is down 6 pounds in the past month.

## 2018-01-23 ENCOUNTER — APPOINTMENT (OUTPATIENT)
Dept: RADIOLOGY | Facility: MEDICAL CENTER | Age: 38
End: 2018-01-23
Attending: OTOLARYNGOLOGY
Payer: COMMERCIAL

## 2018-02-05 ENCOUNTER — OFFICE VISIT (OUTPATIENT)
Dept: MEDICAL GROUP | Facility: MEDICAL CENTER | Age: 38
End: 2018-02-05
Attending: FAMILY MEDICINE
Payer: COMMERCIAL

## 2018-02-05 VITALS
HEIGHT: 72 IN | TEMPERATURE: 98 F | RESPIRATION RATE: 14 BRPM | BODY MASS INDEX: 42.66 KG/M2 | SYSTOLIC BLOOD PRESSURE: 142 MMHG | WEIGHT: 315 LBS | DIASTOLIC BLOOD PRESSURE: 84 MMHG | OXYGEN SATURATION: 98 % | HEART RATE: 108 BPM

## 2018-02-05 DIAGNOSIS — E66.9 DIABETES MELLITUS TYPE 2 IN OBESE (HCC): ICD-10-CM

## 2018-02-05 DIAGNOSIS — L30.9 ECZEMA, UNSPECIFIED TYPE: ICD-10-CM

## 2018-02-05 DIAGNOSIS — E66.01 MORBID OBESITY (HCC): ICD-10-CM

## 2018-02-05 DIAGNOSIS — E11.69 DIABETES MELLITUS TYPE 2 IN OBESE (HCC): ICD-10-CM

## 2018-02-05 PROCEDURE — 99213 OFFICE O/P EST LOW 20 MIN: CPT | Performed by: FAMILY MEDICINE

## 2018-02-05 RX ORDER — PHENTERMINE HYDROCHLORIDE 37.5 MG/1
37.5 CAPSULE ORAL EVERY MORNING
Qty: 30 CAP | Refills: 3 | Status: SHIPPED | OUTPATIENT
Start: 2018-02-05 | End: 2018-06-08 | Stop reason: SDUPTHER

## 2018-02-05 ASSESSMENT — PAIN SCALES - GENERAL: PAINLEVEL: NO PAIN

## 2018-02-05 NOTE — PROGRESS NOTES
Subjective:      Luis Alfredo Javed is a 37 y.o. male who presents with Rash (x 2 months )            HPI1.Morbid obesity-patient has been partially compliant remembering to take his phentermine in the past month. Current weight is down 2 pounds. No recent hair loss or cold intolerance.  2. Eczema-patient recently for about 1 month is noting dryness and some roughness in a.m. several itchy area on his left upper abdominal quadrant. No other similar rashes elsewhere    ROS negative for recent diaphoresis, headaches, global weakness       Objective:     /84   Pulse (!) 108   Temp 36.7 °C (98 °F)   Resp 14   Ht 1.829 m (6')   Wt (!) 153.3 kg (338 lb)   SpO2 98%   BMI 45.84 kg/m²      Physical Exam  Gen.- alert, cooperative, in no acute distress  Neck- midline trachea, thyroid not enlarged or tender,supple, no cervical adenopathy  Chest-clear to auscultation and percussion with normal breath sounds. No retractions. Chest wall nontender  Cardiac- regular rhythm and rate. No murmur, thrill, or heave  Skin-6 cm area of fine papules and dryness noted in the left upper quadrant. There is no redness scaling or crusting.          Assessment/Plan:     1. Morbid obesity (CMS-[facility])      2. Diabetes mellitus type 2 in obese (CMS-[facility])      3. Eczema, unspecified type    Plan: 1. Continue on phentermine along with portion reduction to achieve further weight reduction  2. Discussed changing soaps to Ivory, use of a moisturizer such as Lubriderm

## 2018-04-27 RX ORDER — LISINOPRIL 10 MG/1
TABLET ORAL
Qty: 30 TAB | Refills: 6 | Status: SHIPPED | OUTPATIENT
Start: 2018-04-27 | End: 2019-01-11 | Stop reason: SDUPTHER

## 2018-06-08 ENCOUNTER — OFFICE VISIT (OUTPATIENT)
Dept: MEDICAL GROUP | Facility: MEDICAL CENTER | Age: 38
End: 2018-06-08
Attending: FAMILY MEDICINE
Payer: COMMERCIAL

## 2018-06-08 VITALS
HEIGHT: 72 IN | TEMPERATURE: 97.6 F | WEIGHT: 315 LBS | OXYGEN SATURATION: 95 % | RESPIRATION RATE: 16 BRPM | HEART RATE: 108 BPM | SYSTOLIC BLOOD PRESSURE: 124 MMHG | BODY MASS INDEX: 42.66 KG/M2 | DIASTOLIC BLOOD PRESSURE: 88 MMHG

## 2018-06-08 DIAGNOSIS — E66.9 DIABETES MELLITUS TYPE 2 IN OBESE (HCC): ICD-10-CM

## 2018-06-08 DIAGNOSIS — E11.69 DIABETES MELLITUS TYPE 2 IN OBESE (HCC): ICD-10-CM

## 2018-06-08 DIAGNOSIS — I10 ESSENTIAL HYPERTENSION: ICD-10-CM

## 2018-06-08 DIAGNOSIS — E66.01 MORBID OBESITY (HCC): ICD-10-CM

## 2018-06-08 DIAGNOSIS — K21.9 GASTROESOPHAGEAL REFLUX DISEASE WITHOUT ESOPHAGITIS: ICD-10-CM

## 2018-06-08 PROCEDURE — 99214 OFFICE O/P EST MOD 30 MIN: CPT | Performed by: FAMILY MEDICINE

## 2018-06-08 PROCEDURE — 99212 OFFICE O/P EST SF 10 MIN: CPT | Performed by: FAMILY MEDICINE

## 2018-06-08 RX ORDER — PHENTERMINE HYDROCHLORIDE 37.5 MG/1
37.5 CAPSULE ORAL EVERY MORNING
Qty: 30 CAP | Refills: 2 | Status: SHIPPED | OUTPATIENT
Start: 2018-06-08 | End: 2018-07-08

## 2018-06-08 RX ORDER — GABAPENTIN 300 MG/1
300 CAPSULE ORAL 3 TIMES DAILY
Qty: 90 CAP | Refills: 6 | Status: SHIPPED | OUTPATIENT
Start: 2018-06-08 | End: 2018-07-22

## 2018-06-08 RX ORDER — RANITIDINE 300 MG/1
300 TABLET ORAL
Qty: 30 TAB | Refills: 6 | Status: SHIPPED | OUTPATIENT
Start: 2018-06-08 | End: 2020-07-11

## 2018-06-08 RX ORDER — SIMVASTATIN 10 MG
10 TABLET ORAL EVERY EVENING
Qty: 30 TAB | Refills: 11 | Status: SHIPPED | OUTPATIENT
Start: 2018-06-08 | End: 2020-07-11

## 2018-06-08 RX ORDER — METOPROLOL SUCCINATE 50 MG/1
50 TABLET, EXTENDED RELEASE ORAL
Qty: 30 TAB | Refills: 9 | Status: SHIPPED | OUTPATIENT
Start: 2018-06-08 | End: 2019-06-27 | Stop reason: SDUPTHER

## 2018-06-08 NOTE — PROGRESS NOTES
Chief Complaint   Patient presents with   • Numbness       HISTORY OF PRESENT ILLNESS: Patient is a 38 y.o. male established patient who presents today to follow-up on hypertension, morbid obesity, diabetes mellitus. Patient lost his health care coverage in February and then lost his most recent job in early May. Is starting a new job in 2 weeks.        HTN (hypertension)  Patient has recently been compliant taking his metoprolol. Not reporting chest pain, chest pressure, dyspnea.    Morbid obesity  Patient reports that after losing his job when he started one month ago he has increased his sugar intake due in part to a measure of frustration. He has not had phentermine in approximately 2-3 months. Not reporting any unexplained hair loss or cold intolerance.    Diabetes mellitus type 2 in obese  Patient reports that she he was terminated from Medicaid due to over earning back in February. He has not been on pioglitazone for approximately 3 months. Has recently not been checking sugars either. Not reporting any low blood sugar episodes characterized by headache or diaphoresis.   Social History-, starting a job in 2 weeks    Patient Active Problem List    Diagnosis Date Noted   • Acute pain of both shoulders 09/07/2017   • Atypical chest pain 06/30/2017   • Alcohol abuse, episodic 01/19/2017   • Acute right flank pain 01/19/2017   • Left sided sciatica 01/03/2017   • Cellulitis of extremity 01/03/2017   • Otalgia of both ears 08/30/2016   • Sleep apnea in adult 06/06/2016   • Anxiety 01/26/2016   • Morbid obesity (HCC) 09/22/2015   • Tobacco abuse counseling 08/27/2015   • GERD (gastroesophageal reflux disease) 04/06/2015   • Hypertriglyceridemia 01/05/2015   • Diabetes mellitus type 2 in obese (HCC) 12/22/2014   • HTN (hypertension) 12/10/2014   • Chest pain 12/10/2014       Allergies:Patient has no known allergies.    Current Outpatient Prescriptions   Medication Sig Dispense Refill   • simvastatin (ZOCOR) 10  MG Tab Take 1 Tab by mouth every evening. 30 Tab 11   • gabapentin (NEURONTIN) 300 MG Cap Take 1 Cap by mouth 3 times a day. 90 Cap 6   • metoprolol SR (TOPROL XL) 50 MG TABLET SR 24 HR Take 1 Tab by mouth every day. TAKE 1 TAB BY MOUTH EVERY DAY. 30 Tab 9   • ranitidine (ZANTAC) 300 MG tablet Take 1 Tab by mouth every day. TAKE 1 TAB BY MOUTH EVERY DAY. 30 Tab 6   • metFORMIN (GLUCOPHAGE) 500 MG Tab Take 2 Tabs by mouth 2 times a day, with meals. 120 Tab 11   • phentermine 37.5 MG capsule Take 1 Cap by mouth every morning for 30 days. 30 Cap 2   • sertraline (ZOLOFT) 100 MG Tab TAKE 1 TAB BY MOUTH EVERY DAY. 30 Tab 1   • sertraline (ZOLOFT) 100 MG Tab TAKE 1 TAB BY MOUTH EVERY DAY. 30 Tab 6   • lisinopril (PRINIVIL) 10 MG Tab TAKE 1 TABLET BY MOUTH EVERY DAY 30 Tab 6   • varenicline (CHANTIX STARTING MONTH PAK) 0.5 MG X 11 & 1 MG X 42 tablet 1 by mouth twice a day 56 Tab 3   • pioglitazone (ACTOS) 45 MG Tab Take 1 Tab by mouth every day. 30 Tab 11   • Omega-3 Fatty Acids (FISH OIL) 1000 MG Cap capsule Take 1,000 mg by mouth every day.     • B Complex-C (SUPER B COMPLEX PO) Take 1 Tab by mouth every day.     • multivitamin (THERAGRAN) Tab Take 1 Tab by mouth every day.     • aspirin EC (ECOTRIN) 81 MG TBEC Take 81 mg by mouth every day.       No current facility-administered medications for this visit.        Social History   Substance Use Topics   • Smoking status: Current Every Day Smoker     Packs/day: 1.50     Years: 21.00     Types: Cigarettes   • Smokeless tobacco: Never Used   • Alcohol use 0.0 oz/week      Comment: 3 to 4 times weekly       Family History   Problem Relation Age of Onset   • Diabetes Mother    • Stroke Father    • Alcohol/Drug Maternal Grandfather    • Diabetes Paternal Grandfather    • Cancer Paternal Grandfather      lung   • Heart Disease Neg Hx        ROS:  Review of Systems   Constitutional: Negative for fever, chills, weight loss and malaise/fatigue.   Eyes: Negative for blurred vision.  "  Respiratory: Negative for cough, sputum production, shortness of breath and wheezing.    Cardiovascular: Negative for chest pain, palpitations, orthopnea and leg swelling.   Gastrointestinal: Negative for heartburn, nausea, vomiting and abdominal pain.    Endo/Heme/Allergies: Does not bruise/bleed easily.               Exam:  Blood pressure 124/88, pulse (!) 108, temperature 36.4 °C (97.6 °F), resp. rate 16, height 1.829 m (6' 0.01\"), weight (!) 155.1 kg (342 lb), SpO2 95 %.  General:  Well nourished, well developed male in NAD  Head is grossly normal.  Neck: Supple without JVD or bruit. Thyroid is not enlarged. Trachea is midline.  Pulmonary: Clear to ausculation .  Normal effort. No rales, ronchi, or wheezing.  Cardiovascular: Regular rate and rhythm without murmur.  Abdomen-Abdomen is soft, normal bowel sounds, no masses, guarding, ororganomegaly, or tenderness.  Lower extremities- neg for pretibial edema, redness, tenderness.      Please note that this dictation was created using voice recognition software. I have made every reasonable attempt to correct obvious errors, but I expect that there are errors of grammar and possibly content that I did not discover before finalizing the note.    Assessment/Plan:  1. Essential hypertension     2. Diabetes mellitus type 2 in obese (HCC)     3. Gastroesophageal reflux disease without esophagitis     4. Morbid obesity (HCC)  phentermine 37.5 MG capsule     Plan: 1. Prescriptions were renewed today  2. Patient degrees to start checking before breakfast sugars and when possible a late afternoon because level if he is at home near his meter.  3. 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  4. Restart phentermine 37.5 mg capsule one daily  5. Revisit with me and review sugars in one month   "

## 2018-06-08 NOTE — ASSESSMENT & PLAN NOTE
Patient has recently been compliant taking his metoprolol. Not reporting chest pain, chest pressure, dyspnea.

## 2018-06-08 NOTE — ASSESSMENT & PLAN NOTE
Patient reports that she he was terminated from Medicaid due to over earning back in February. He has not been on pioglitazone for approximately 3 months. Has recently not been checking sugars either. Not reporting any low blood sugar episodes characterized by headache or diaphoresis.

## 2018-06-08 NOTE — ASSESSMENT & PLAN NOTE
Patient reports that after losing his job when he started one month ago he has increased his sugar intake due in part to a measure of frustration. He has not had phentermine in approximately 2-3 months. Not reporting any unexplained hair loss or cold intolerance.

## 2018-07-06 ENCOUNTER — OFFICE VISIT (OUTPATIENT)
Dept: MEDICAL GROUP | Facility: MEDICAL CENTER | Age: 38
End: 2018-07-06
Attending: FAMILY MEDICINE
Payer: COMMERCIAL

## 2018-07-06 VITALS
HEIGHT: 72 IN | WEIGHT: 315 LBS | RESPIRATION RATE: 16 BRPM | DIASTOLIC BLOOD PRESSURE: 88 MMHG | TEMPERATURE: 98.3 F | SYSTOLIC BLOOD PRESSURE: 130 MMHG | OXYGEN SATURATION: 96 % | HEART RATE: 100 BPM | BODY MASS INDEX: 42.66 KG/M2

## 2018-07-06 DIAGNOSIS — E66.9 DIABETES MELLITUS TYPE 2 IN OBESE (HCC): ICD-10-CM

## 2018-07-06 DIAGNOSIS — M75.81 ROTATOR CUFF TENDONITIS, RIGHT: ICD-10-CM

## 2018-07-06 DIAGNOSIS — E11.69 DIABETES MELLITUS TYPE 2 IN OBESE (HCC): ICD-10-CM

## 2018-07-06 DIAGNOSIS — L03.112 CELLULITIS OF LEFT AXILLA: ICD-10-CM

## 2018-07-06 PROCEDURE — 99213 OFFICE O/P EST LOW 20 MIN: CPT | Performed by: FAMILY MEDICINE

## 2018-07-06 PROCEDURE — 99214 OFFICE O/P EST MOD 30 MIN: CPT | Performed by: FAMILY MEDICINE

## 2018-07-06 RX ORDER — NAPROXEN 500 MG/1
500 TABLET ORAL 2 TIMES DAILY WITH MEALS
Qty: 60 TAB | Refills: 0 | Status: SHIPPED | OUTPATIENT
Start: 2018-07-06 | End: 2018-07-22

## 2018-07-06 RX ORDER — SULFAMETHOXAZOLE AND TRIMETHOPRIM 800; 160 MG/1; MG/1
1 TABLET ORAL 2 TIMES DAILY
Qty: 28 TAB | Refills: 0 | Status: SHIPPED | OUTPATIENT
Start: 2018-07-06 | End: 2018-07-22

## 2018-07-06 ASSESSMENT — PAIN SCALES - GENERAL: PAINLEVEL: NO PAIN

## 2018-07-06 NOTE — ASSESSMENT & PLAN NOTE
Patient reports he has reestablished taking metformin 1000 mg twice daily along with pioglitazone 45 mg once daily. His only infrequently checking sugars morning sugar this morning was 154. Not having any symptomatic low blood sugars characterized by headache or diaphoresis. Is working 12 hour shifts at [employer], reports that he does walk a lot at that job.

## 2018-07-06 NOTE — PROGRESS NOTES
No chief complaint on file.      HISTORY OF PRESENT ILLNESS: Patient is a 38 y.o. male established patient who presents today to follow-up on cellulitis of the left axilla, uncontrolled diabetes mellitus, chronic right shoulder pain        Cellulitis of left axilla  Patient notes tender swollen linear area of skin in his left axilla. Reports that this area will thicken and become tender intermittently dating back for several years. There has never been any drainage. Patient does have a past history of boils in both armpits. Denies any current fever or left arm pain    Diabetes mellitus type 2 in obese  Patient reports he has reestablished taking metformin 1000 mg twice daily along with pioglitazone 45 mg once daily. His only infrequently checking sugars morning sugar this morning was 154. Not having any symptomatic low blood sugars characterized by headache or diaphoresis. Is working 12 hour shifts at [employer], reports that he does walk a lot at that job.    Rotator cuff tendonitis, right  Patient reports her current history of pain in his right shoulder dating back over 2-3 years. X-rays of the right shoulder were unremarkable in 2017. Patient reports over the past 3 months he has had fluctuating level of pain typically severe in the right shoulder about 1 day out of 7. Does not report any limitation with overhead lifting. Is reporting recurrent popping and general ache in his right shoulder. No immediate history of overuse or recent trauma. Notes some mild numbness diffusely in both hands that seems to relate more to his elevated blood sugar levels  Social history-, working    Patient Active Problem List    Diagnosis Date Noted   • Cellulitis of left axilla 07/06/2018   • Rotator cuff tendonitis, right 07/06/2018   • Acute pain of both shoulders 09/07/2017   • Atypical chest pain 06/30/2017   • Alcohol abuse, episodic 01/19/2017   • Acute right flank pain 01/19/2017   • Left sided sciatica 01/03/2017   •  Cellulitis of extremity 01/03/2017   • Otalgia of both ears 08/30/2016   • Sleep apnea in adult 06/06/2016   • Anxiety 01/26/2016   • Morbid obesity ([facility]) 09/22/2015   • Tobacco abuse counseling 08/27/2015   • GERD (gastroesophageal reflux disease) 04/06/2015   • Hypertriglyceridemia 01/05/2015   • Diabetes mellitus type 2 in obese ([facility]) 12/22/2014   • HTN (hypertension) 12/10/2014   • Chest pain 12/10/2014       Allergies:Patient has no known allergies.    Current Outpatient Prescriptions   Medication Sig Dispense Refill   • sulfamethoxazole-trimethoprim (BACTRIM DS) 800-160 MG tablet Take 1 Tab by mouth 2 times a day. 28 Tab 0   • naproxen (NAPROSYN) 500 MG Tab Take 1 Tab by mouth 2 times a day, with meals. 60 Tab 0   • lisinopril (PRINIVIL) 10 MG Tab TAKE 1 TABLET BY MOUTH EVERY DAY 30 Tab 6   • simvastatin (ZOCOR) 10 MG Tab Take 1 Tab by mouth every evening. 30 Tab 11   • gabapentin (NEURONTIN) 300 MG Cap Take 1 Cap by mouth 3 times a day. 90 Cap 6   • metoprolol SR (TOPROL XL) 50 MG TABLET SR 24 HR Take 1 Tab by mouth every day. TAKE 1 TAB BY MOUTH EVERY DAY. 30 Tab 9   • ranitidine (ZANTAC) 300 MG tablet Take 1 Tab by mouth every day. TAKE 1 TAB BY MOUTH EVERY DAY. 30 Tab 6   • metFORMIN (GLUCOPHAGE) 500 MG Tab Take 2 Tabs by mouth 2 times a day, with meals. 120 Tab 11   • phentermine 37.5 MG capsule Take 1 Cap by mouth every morning for 30 days. 30 Cap 2   • sertraline (ZOLOFT) 100 MG Tab TAKE 1 TAB BY MOUTH EVERY DAY. 30 Tab 1   • sertraline (ZOLOFT) 100 MG Tab TAKE 1 TAB BY MOUTH EVERY DAY. 30 Tab 6   • lisinopril (PRINIVIL) 10 MG Tab TAKE 1 TABLET BY MOUTH EVERY DAY 30 Tab 6   • varenicline (CHANTIX STARTING MONTH PAK) 0.5 MG X 11 & 1 MG X 42 tablet 1 by mouth twice a day 56 Tab 3   • pioglitazone (ACTOS) 45 MG Tab Take 1 Tab by mouth every day. 30 Tab 11   • Omega-3 Fatty Acids (FISH OIL) 1000 MG Cap capsule Take 1,000 mg by mouth every day.     • B Complex-C (SUPER B COMPLEX PO) Take 1 Tab by mouth every  "day.     • multivitamin (THERAGRAN) Tab Take 1 Tab by mouth every day.     • aspirin EC (ECOTRIN) 81 MG TBEC Take 81 mg by mouth every day.       No current facility-administered medications for this visit.        Social History   Substance Use Topics   • Smoking status: Current Every Day Smoker     Packs/day: 1.50     Years: 21.00     Types: Cigarettes   • Smokeless tobacco: Never Used   • Alcohol use 0.0 oz/week      Comment: 3 to 4 times weekly       Family History   Problem Relation Age of Onset   • Diabetes Mother    • Stroke Father    • Alcohol/Drug Maternal Grandfather    • Diabetes Paternal Grandfather    • Cancer Paternal Grandfather      lung   • Heart Disease Neg Hx        ROS:  Review of Systems   Constitutional: Negative for fever, chills, weight loss and malaise/fatigue.   Eyes: Negative for blurred vision.   Respiratory: Negative for cough, sputum production, shortness of breath and wheezing.    Cardiovascular: Negative for chest pain, palpitations, orthopnea and leg swelling.   Gastrointestinal: Negative for heartburn, nausea, vomiting and abdominal pain.   Endo/Heme/Allergies: Does not bruise/bleed easily.               Exam:  Blood pressure 130/88, pulse 100, temperature 36.8 °C (98.3 °F), resp. rate 16, height 1.829 m (6' 0.01\"), weight (!) 154.7 kg (341 lb), SpO2 96 %.  General:  Well nourished, well developed male in NAD  Head is grossly normal.  Neck: Supple without JVD or bruit. Thyroid is not enlarged. Trachea is midline.  Pulmonary: Clear to ausculation .  Normal effort. No rales, ronchi, or wheezing.  Cardiovascular: Regular rate and rhythm without murmur.  Abdomen-Abdomen is soft, normal bowel sounds, no masses, guarding, ororganomegaly, or tenderness.  Upper extremities-left axilla notable for a 5 x 10 mm area of slightly pink and thickened epidermis with no fluctuance or drainage in the medial portion of the left axillary recess. Right shoulder is notable for abduction flexion full up to " 180°. Intact right upper extremity distal strength and light touch.      Please note that this dictation was created using voice recognition software. I have made every reasonable attempt to correct obvious errors, but I expect that there are errors of grammar and possibly content that I did not discover before finalizing the note.    Assessment/Plan:  1. Cellulitis of left axilla  REFERRAL TO PHYSICAL THERAPY Reason for Therapy: Eval/Treat/Report   2. Rotator cuff tendonitis, right     3. Diabetes mellitus type 2 in obese (HCC)  MICROALBUMIN CREAT RATIO URINE    COMP METABOLIC PANEL    HEMOGLOBIN A1C      Plan: 1. Patient will continue current diabetes oral medications with updated A1c, CMP, urine microalbumin in 2 months  2. Revisit with me in 2 months after labs collected  3. PT referral for right shoulder  4. Septra DS 1 by mouth twice a day ×14 days follow-up as needed

## 2018-07-06 NOTE — ASSESSMENT & PLAN NOTE
Patient notes tender swollen linear area of skin in his left axilla. Reports that this area will thicken and become tender intermittently dating back for several years. There has never been any drainage. Patient does have a past history of boils in both armpits. Denies any current fever or left arm pain

## 2018-07-06 NOTE — ASSESSMENT & PLAN NOTE
Patient reports her current history of pain in his right shoulder dating back over 2-3 years. X-rays of the right shoulder were unremarkable in 2017. Patient reports over the past 3 months he has had fluctuating level of pain typically severe in the right shoulder about 1 day out of 7. Does not report any limitation with overhead lifting. Is reporting recurrent popping and general ache in his right shoulder. No immediate history of overuse or recent trauma. Notes some mild numbness diffusely in both hands that seems to relate more to his elevated blood sugar levels

## 2018-07-22 ENCOUNTER — NON-PROVIDER VISIT (OUTPATIENT)
Dept: OCCUPATIONAL MEDICINE | Facility: CLINIC | Age: 38
End: 2018-07-22
Payer: COMMERCIAL

## 2018-07-22 ENCOUNTER — APPOINTMENT (OUTPATIENT)
Dept: RADIOLOGY | Facility: MEDICAL CENTER | Age: 38
End: 2018-07-22
Attending: EMERGENCY MEDICINE
Payer: COMMERCIAL

## 2018-07-22 ENCOUNTER — HOSPITAL ENCOUNTER (EMERGENCY)
Facility: MEDICAL CENTER | Age: 38
End: 2018-07-22
Attending: EMERGENCY MEDICINE
Payer: COMMERCIAL

## 2018-07-22 VITALS
OXYGEN SATURATION: 94 % | RESPIRATION RATE: 18 BRPM | SYSTOLIC BLOOD PRESSURE: 143 MMHG | DIASTOLIC BLOOD PRESSURE: 81 MMHG | HEART RATE: 85 BPM | HEIGHT: 74 IN | TEMPERATURE: 97.9 F | BODY MASS INDEX: 40.43 KG/M2 | WEIGHT: 315 LBS

## 2018-07-22 DIAGNOSIS — Z02.1 PRE-EMPLOYMENT DRUG SCREENING: ICD-10-CM

## 2018-07-22 DIAGNOSIS — Z02.83 ENCOUNTER FOR DRUG SCREENING: ICD-10-CM

## 2018-07-22 DIAGNOSIS — S93.401A SPRAIN OF RIGHT ANKLE, UNSPECIFIED LIGAMENT, INITIAL ENCOUNTER: ICD-10-CM

## 2018-07-22 LAB
AMP AMPHETAMINE: NORMAL
BAR BARBITURATES: NORMAL
BREATH ALCOHOL COMMENT: NORMAL
BZO BENZODIAZEPINES: NORMAL
COC COCAINE: NORMAL
INT CON NEG: NEGATIVE
INT CON POS: POSITIVE
MDMA ECSTASY: NORMAL
MET METHAMPHETAMINES: NORMAL
MTD METHADONE: NORMAL
OPI OPIATES: NORMAL
OXY OXYCODONE: NORMAL
PCP PHENCYCLIDINE: NORMAL
POC BREATHALIZER: 0 PERCENT (ref 0–0.01)
POC URINE DRUG SCREEN OCDRS: NEGATIVE
THC: NORMAL

## 2018-07-22 PROCEDURE — 99284 EMERGENCY DEPT VISIT MOD MDM: CPT

## 2018-07-22 PROCEDURE — 73620 X-RAY EXAM OF FOOT: CPT | Mod: LT

## 2018-07-22 PROCEDURE — 82075 ASSAY OF BREATH ETHANOL: CPT | Performed by: INTERNAL MEDICINE

## 2018-07-22 PROCEDURE — 73610 X-RAY EXAM OF ANKLE: CPT | Mod: LT

## 2018-07-22 PROCEDURE — 80305 DRUG TEST PRSMV DIR OPT OBS: CPT | Performed by: INTERNAL MEDICINE

## 2018-07-22 RX ORDER — SULFAMETHOXAZOLE AND TRIMETHOPRIM 800; 160 MG/1; MG/1
1 TABLET ORAL 2 TIMES DAILY
Status: SHIPPED | COMMUNITY
Start: 2018-07-06 | End: 2019-01-09 | Stop reason: CLARIF

## 2018-07-22 RX ORDER — GABAPENTIN 300 MG/1
300 CAPSULE ORAL 2 TIMES DAILY
Status: SHIPPED | COMMUNITY
End: 2021-10-06

## 2018-07-22 RX ORDER — NAPROXEN 500 MG/1
500 TABLET ORAL 2 TIMES DAILY PRN
Status: SHIPPED | COMMUNITY
End: 2019-01-09 | Stop reason: CLARIF

## 2018-07-22 ASSESSMENT — PAIN SCALES - GENERAL: PAINLEVEL_OUTOF10: 7

## 2018-07-22 NOTE — ED NOTES
Discharge instructions provided. Pt educated to follow up with occupational health at work for further instructed as to when to return to work, Crutches education given by ED teach and pt demonstrated back understanding,  Pt verbalized the understanding of discharge instructions to follow up with PCP and to return to ER if condition worsens.  Pt ambulated out of ER without difficulty.

## 2018-07-22 NOTE — DISCHARGE INSTRUCTIONS
Ankle Sprain, Phase II Rehab  Ask your health care provider which exercises are safe for you. Do exercises exactly as told by your health care provider and adjust them as directed. It is normal to feel mild stretching, pulling, tightness, or discomfort as you do these exercises, but you should stop right away if you feel sudden pain or your pain gets worse. Do not begin these exercises until told by your health care provider.  Stretching and range of motion exercises  These exercises warm up your muscles and joints and improve the movement and flexibility of your lower leg and ankle. These exercises also help to relieve pain and stiffness.  Exercise A: Gastroc stretch, standing  1. Stand with your hands against a wall.  2. Extend your left / right leg behind you, and bend your front knee slightly. Your heels should be on the floor.  3. Keeping your heels on the floor and your back knee straight, shift your weight toward the wall. You should feel a gentle stretch in the back of your lower leg (calf).  4. Hold this position for __________ seconds.  Repeat __________ times. Complete this exercise __________ times a day.  Exercise B: Soleus stretch, standing  1. Stand with your hands against a wall.  2. Extend your left / right leg behind you, and bend your front knee slightly. Both of your heels should be on the floor.  3. Keeping your heels on the floor, bend your back knee and shift your weight slightly over your back leg. You should feel a gentle stretch deep in your calf.  4. Hold this position for __________ seconds.  Repeat __________ times. Complete this exercise __________ times a day.  Strengthening exercises  These exercises build strength and endurance in your lower leg. Endurance is the ability to use your muscles for a long time, even after they get tired.  Exercise C: Heel walking (dorsiflexion)  Walk on your heels for __________ seconds or ___________ ft. Keep your toes as high as possible.  Repeat  __________ times. Complete this exercise __________ times a day.  Balance exercises  These exercises improve your balance and the reaction and control of your ankle to help improve stability.  Exercise D: Multi-angle lunge  1. Stand with your feet together.  2. Take a step forward with your left / right leg, and shift your weight onto that leg. Your back heel will come off the floor, and your back toes will stay in place.  3. Push off your front leg to return your front foot to the starting position next to your other foot.  4. Repeat to the side, to the back, and any other directions as told by your health care provider.  Repeat in each direction __________ times. Complete this exercise __________ times a day.  Exercise E: Single leg stand  1. Without shoes, stand near a railing or in a door frame. Hold onto the railing or door frame as needed.  2. Stand on your left / right foot. Keep your big toe down on the floor and try to keep your arch lifted.  3. Hold this position for __________ seconds.  Repeat __________ times. Complete this exercise __________ times a day.  If this exercise is too easy, you can try it with your eyes closed or while standing on a pillow.  Exercise F: Inversion/eversion  You will need a balance board for this exercise. Ask your health care provider where you can get a balance board or how you can make one.  1. Stand on a non-carpeted surface near a countertop or wall.  2. Step onto the balance board so your feet are hip-width apart.  3. Keep your feet in place and keep your upper body and hips steady. Using only your feet and ankles to move the board, do one or both of the following exercises as told by your health care provider:  ¨ Tip the board side to side as far as you can, alternating between tipping to the left and tipping to the right. If you can, tip the board so it silently taps the floor. Do not let the board forcefully hit the floor. From time to time, pause to hold a steady  position.  ¨ Tip the board side to side so the board does not hit the floor at all. From time to time, pause to hold a steady position.  Repeat the movement for each exercise __________ times. Complete each exercise __________ times a day.  Exercise G: Plantar flexion/dorsiflexion  You will need a balance board for this exercise. Ask your health care provider where you can get a balance board or how you can make one.  1. Stand on a non-carpeted surface near a countertop or wall.  2. Step onto the balance board so your feet are hip-width apart.  3. Keep your feet in place and keep your upper body and hips steady. Using only your feet and ankles to move the board, do one or both of the following exercises as told by your health care provider:  ¨ Tip the board forward and backward so the board silently taps the floor. Do not let the board forcefully hit the floor. From time to time, pause to hold a steady position.  ¨ Tip the board forward and backward so the board does not hit the floor at all. From time to time, pause to hold a steady position.  Repeat the movement for each exercise __________ times. Complete each exercise __________ times a day.  This information is not intended to replace advice given to you by your health care provider. Make sure you discuss any questions you have with your health care provider.  Document Released: 04/09/2007 Document Revised: 08/24/2017 Document Reviewed: 10/31/2016  [employer] Interactive Patient Education © 2017 [employer]      Ankle Sprain  Introduction  An ankle sprain is a stretch or tear in one of the tough tissues (ligaments) in your ankle.  Follow these instructions at home:  · Rest your ankle.  · Take over-the-counter and prescription medicines only as told by your doctor.  · For 2-3 days, keep your ankle higher than the level of your heart (elevated) as much as possible.  · If directed, put ice on the area:  ¨ Put ice in a plastic bag.  ¨ Place a towel between your  skin and the bag.  ¨ Leave the ice on for 20 minutes, 2-3 times a day.  · If you were given a brace:  ¨ Wear it as told.  ¨ Take it off to shower or bathe.  ¨ Try not to move your ankle much, but wiggle your toes from time to time. This helps to prevent swelling.  · If you were given an elastic bandage (dressing):  ¨ Take it off when you shower or bathe.  ¨ Try not to move your ankle much, but wiggle your toes from time to time. This helps to prevent swelling.  ¨ Adjust the bandage to make it more comfortable if it feels too tight.  ¨ Loosen the bandage if you lose feeling in your foot, your foot tingles, or your foot gets cold and blue.  · If you have crutches, use them as told by your doctor. Continue to use them until you can walk without feeling pain in your ankle.  Contact a doctor if:  · Your bruises or swelling are quickly getting worse.  · Your pain does not get better after you take medicine.  Get help right away if:  · You cannot feel your toes or foot.  · Your toes or your foot looks blue.  · You have very bad pain that gets worse.  This information is not intended to replace advice given to you by your health care provider. Make sure you discuss any questions you have with your health care provider.  Document Released: 06/05/2009 Document Revised: 05/25/2017 Document Reviewed: 07/19/2016  © 2017 [employer]

## 2018-07-22 NOTE — ED NOTES
"Chief Complaint   Patient presents with   • Fall     tripped and fell   • Ankle Injury     Left     /81   Pulse 89   Temp 36.9 °C (98.5 °F)   Resp 20   Ht 1.88 m (6' 2\")   Wt (!) 156.6 kg (345 lb 3.9 oz)   SpO2 95%   BMI 44.33 kg/m²     "

## 2018-07-22 NOTE — ED PROVIDER NOTES
"ED Provider Note  CHIEF COMPLAINT  Chief Complaint   Patient presents with   • Fall     tripped and fell   • Ankle Injury     Left       HPI  Luis Alfredo Javed is a 38 y.o. male who presents complaining of pain to the left ankle and left foot after a twisting injury.  He was at work and slipped on something and injured his left ankle and foot.  This is a work-related injury.  He sustained no other injury.  He is otherwise fine.    REVIEW OF SYSTEMS  See HPI for further details.  No injury to the rest of the leg move the knee nor the hip.  No other injuries noted.  PAST MEDICAL HISTORY  Past Medical History:   Diagnosis Date   • Chest pain     x 5 years.   • Hypertension    • Type II or unspecified type diabetes mellitus without mention of complication, not stated as uncontrolled        FAMILY HISTORY  Family History   Problem Relation Age of Onset   • Diabetes Mother    • Stroke Father    • Alcohol/Drug Maternal Grandfather    • Diabetes Paternal Grandfather    • Cancer Paternal Grandfather      lung   • Heart Disease Neg Hx        SOCIAL HISTORY  History of alcohol abuse and previous methamphetamine abuse.    SURGICAL HISTORY  Past Surgical History:   Procedure Laterality Date   • HERNIA REPAIR  age 4   • STRABISMUS REPAIR  age 5       ALLERGIES  No Known Allergies    PHYSICAL EXAM  VITAL SIGNS: /81   Pulse 89   Temp 36.9 °C (98.5 °F)   Resp 20   Ht 1.88 m (6' 2\")   Wt (!) 156.6 kg (345 lb 3.9 oz)   SpO2 95%   BMI 44.33 kg/m²   vital signs are noted  Constitutional: Alert healthy-appearing adult in no distress   HENT: Normocephalic   Eyes: No trauma   Neck: No neck pain  Cardiovascular: Regular rhythm   Thorax & Lungs: No respiratory distress   Skin: Warm, Dry, No rash.   Back: No back pain  Musculoskeletal: Pain to the left foot and left ankle.  No obvious deformity.  Good pedal pulse foot is warm pink and dry.  Other extremities nontender.  No sign of other trauma.  Neurologic: Alert  Normal motor " function,  No obvious focal deficits noted.   Psychiatric: Affect normal, and mood normal.       RADIOLOGY/PROCEDURES  DX-ANKLE 3+ VIEWS LEFT   Final Result      No acute osseous abnormality.      DX-FOOT-2- LEFT   Final Result         No acute osseous abnormality.            COURSE & MEDICAL DECISION MAKING  This is a work-related injury and an x-ray of the left ankle and foot have been obtained to rule out any fracture.    X-ray of the ankle shows no fracture.    Home treatment: #1 crutches for the pain #2 ice and elevation #3 Tylenol for pain #4 he can follow-up at the Workmen's Comp. clinic with [facility] tomorrow.    FINAL IMPRESSION  1.  Ankle sprain         Electronically signed by: Gary Gansert, 7/22/2018 /12 noon m

## 2018-07-22 NOTE — LETTER
"  FORM C-4:  EMPLOYEE’S CLAIM FOR COMPENSATION/ REPORT OF INITIAL TREATMENT  EMPLOYEE’S CLAIM - PROVIDE ALL INFORMATION REQUESTED   First Name  Luis Alfredo Last Name  Javed Birthdate             Age  1980 38 y.o. Sex  male Claim Number   Home Employee Address  [address]  [facility]                                     Zip  [address] Height  1.88 m (6' 2\") Weight  (!) 156.6 kg (345 lb 3.9 oz) [facility]     Mailing Employee Address                           [address]   [facility]               Zip  [address] Telephone  [phone] (home)  Primary Language Spoken  ENGLISH   Insurer  [employer] Employee's Occupation (Job Title) When Injury or Occupational Disease Occurred     Employer's Name  [employer] Telephone  [phone]    Employer Address  [address] [facility] [place] [address]] Zip  [address]   Date of Injury  7/22/2018       Hour of Injury  7:15 AM Date Employer Notified  7/22/2018 Last Day of Work after Injury or Occupational Disease  7/22/2018 Supervisor to Whom Injury Reported  Richy Reid   Address or Location of Accident (if applicable)  [[address]   What were you doing at the time of accident? (if applicable)  Explaining something to a coworker    How did this injury or occupational disease occur? Be specific and answer in detail. Use additional sheet if necessary)  I tripped over a \"annika leg\" that was on the floor and my left ankle buckled, and I   collapsed.   If you believe that you have an occupational disease, when did you first have knowledge of the disability and it relationship to your employment?  n/a Witnesses to the Accident  Lino Cobb     Nature of Injury or Occupational Disease  Sprain  Part(s) of Body Injured or Affected  Ankle (L), N/A, N/A    I certify that the above is true and correct to the best of my knowledge and that I have " provided this information in order to obtain the benefits of [place]  I hereby authorize any physician, chiropractor, surgeon, practitioner, or other person, any hospital, including [facility] or [facility] hospital, any medical service organization, any insurance company, or other institution or organization to release to each other, any medical or other information, including benefits paid or payable, pertinent to this injury or disease, except information relative to diagnosis, treatment and/or counseling for AIDS, psychological conditions, alcohol or controlled substances, for which I must give specific authorization.  A Photostat of this authorization shall be as valid as the original.   Date   7/22/18 Place  [facility] Employee’s Signature   THIS REPORT MUST BE COMPLETED AND MAILED WITHIN 3 WORKING DAYS OF TREATMENT   Place  [facility], EMERGENCY DEPT  Name of Facility   [facility]   Date  7/22/2018 Diagnosis  No diagnosis found. Is there evidence the injured employee was under the influence of alcohol and/or another controlled substance at the time of accident?   Hour  12:16 PM Description of Injury or Disease   No   Treatment  X-ray of the ankle and foot shows no fracture.  Set of crutches.  Tylenol for pain.  [facility]  Have you advised the patient to remain off work five days or more?         No   X-Ray Findings  Negative   If Yes   From Date    To Date      From information given by the employee, together with medical evidence, can you directly connect this injury or occupational disease as job incurred?  Yes If No, is the employee capable of: Full Duty  No Modified Duty      Is additional medical care by a physician indicated?  Yes If Modified Duty, Specify any Limitations /  "Restrictions        Do you know of any previous injury or disease contributing to this condition or occupational disease?  No   Date  7/22/2018 Print Doctor’s Name  Gansert, Gary I certify the employer’s copy of this form was mailed on:   Address  [address] [id number] [phone] Insurer’s Use Only   [facility]  [address]    Provider’s Tax ID Number  [id number] Telephone  Dept: [phone]    Doctor’s Signature  e-SignGANSERT, GARY M.D. Degree   M.D    Original - TREATING PHYSICIAN OR CHIROPRACTOR   Pg 2-Insurer/TPA   Pg 3-Employer   Pg 4-Employee                                                                                                  Form C-4 (rev01/03)     BRIEF DESCRIPTION OF RIGHTS AND BENEFITS  (Pursuant to [place]RS 616C.050)    Notice of Injury or Occupational Disease (Incident Report Form C-1): If an injury or occupational disease (OD) arises out of and in the course of employment, you must provide written notice to your employer as soon as practicable, but no later than 7 days after the accident or OD. Your employer shall maintain a sufficient supply of the required forms.    Claim for Compensation (Form C-4): If medical treatment is sought, the form C-4 is available at the place of initial treatment. A completed \"Claim for Compensation\" (Form C-4) must be filed within 90 days after an accident or OD. The treating physician or chiropractor must, within 3 working days after treatment, complete and mail to the employer, the employer's insurer and third-party , the Claim for Compensation.    Medical Treatment: If you require medical treatment for your on-the-job injury or OD, you may be required to select a physician or chiropractor from a list provided by your workers’ compensation insurer, if it has contracted with an Organization for Managed Care (MCO) or Preferred Provider Organization (PPO) or providers of health care. If your employer has not " entered into a contract with an MCO or PPO, you may select a physician or chiropractor from the Panel of Physicians and Chiropractors. Any medical costs related to your industrial injury or OD will be paid by your insurer.    Temporary Total Disability (TTD): If your doctor has certified that you are unable to work for a period of at least 5 consecutive days, or 5 cumulative days in a 20-day period, or places restrictions on you that your employer does not accommodate, you may be entitled to TTD compensation.    Temporary Partial Disability (TPD): If the wage you receive upon reemployment is less than the compensation for TTD to which you are entitled, the insurer may be required to pay you TPD compensation to make up the difference. TPD can only be paid for a maximum of 24 months.    Permanent Partial Disability (PPD): When your medical condition is stable and there is an indication of a PPD as a result of your injury or OD, within 30 days, your insurer must arrange for an evaluation by a rating physician or chiropractor to determine the degree of your PPD. The amount of your PPD award depends on the date of injury, the results of the PPD evaluation and your age and wage.    Permanent Total Disability (PTD): If you are medically certified by a treating physician or chiropractor as permanently and totally disabled and have been granted a PTD status by your insurer, you are entitled to receive monthly benefits not to exceed 66 2/3% of your average monthly wage. The amount of your PTD payments is subject to reduction if you previously received a PPD award.    Vocational Rehabilitation Services: You may be eligible for vocational rehabilitation services if you are unable to return to the job due to a permanent physical impairment or permanent restrictions as a result of your injury or occupational disease.    Transportation and Per Ray Reimbursement: You may be eligible for travel expenses and per ray associated with  medical treatment.  Reopening: You may be able to reopen your claim if your condition worsens after claim closure.    Appeal Process: If you disagree with a written determination issued by the insurer or the insurer does not respond to your request, you may appeal to the Department of Administration, , by following the instructions contained in your determination letter. You must appeal the determination within 70 days from the date of the determination letter at [address], [address], [place], [place] [address], or [address], [address], [place], [place] [address]. If you disagree with the  decision, you may appeal to the Department of Administration, . You must file your appeal within 30 days from the date of the  decision letter at [address], [address], [place], [place] [address], or [address], [address] [facility] [address], [place], [place] [address]. If you disagree with a decision of an , you may file a petition for judicial review with the District Court. You must do so within 30 days of the Appeal Officer’s decision. You may be represented by an  at your own expense or you may contact the [facility] for possible representation.    [place]  for Injured Workers (NAIW): If you disagree with a  decision, you may request that NAIW represent you without charge at an  Hearing. For information regarding denial of benefits, you may contact the [facility] at: [address], [address], [place] [address], [phone], or [address], [address], [place] [address], [phone]    To File a Complaint with the Division: If you wish to file a complaint with the  of the Division of Industrial Relations (DIR), please contact the Workers’ Compensation Section, [address], [address], [place], [place] [address], telephone (050) [phone], or [address], [address], [place], [place] [address], telephone [phone].    For assistance with Workers’ Compensation Issues: you may contact the Office of the Governor Consumer Health Assistance, [address] [place] [address], [address], [place], [place] [address], Toll Free 1-898.120.8582, Web site: http://[employer].[facility].nv., E-mail [email or web address].                                                                                                                                                                         __________________________________________________________________                                    7/22/18            Employee Name / Signature                                                                                                                            Date                                       D-2 (rev. 10/07)

## 2018-07-22 NOTE — ED NOTES
Pt trip and fall againts an object on the floor while walking at work this am, L ankle pain but no swelling noted, CMS intact.

## 2018-07-22 NOTE — ED NOTES
Pt educated on plan of care and verbalize understanding. No more needs stated at this time, will cont monitoring.

## 2018-07-24 ENCOUNTER — PHYSICAL THERAPY (OUTPATIENT)
Dept: PHYSICAL THERAPY | Facility: REHABILITATION | Age: 38
End: 2018-07-24
Attending: FAMILY MEDICINE
Payer: COMMERCIAL

## 2018-07-24 DIAGNOSIS — M25.511 RIGHT SHOULDER PAIN, UNSPECIFIED CHRONICITY: ICD-10-CM

## 2018-07-24 DIAGNOSIS — L03.112 CELLULITIS OF LEFT AXILLA: ICD-10-CM

## 2018-07-24 PROCEDURE — 97162 PT EVAL MOD COMPLEX 30 MIN: CPT

## 2018-07-24 ASSESSMENT — ENCOUNTER SYMPTOMS
PAIN SCALE AT HIGHEST: 5
ALLEVIATING FACTORS: NOTHING
QUALITY: ACHING
PAIN SCALE: 3
PAIN SCALE AT LOWEST: 0

## 2018-07-24 NOTE — OP THERAPY EVALUATION
Outpatient Physical Therapy  INITIAL EVALUATION    [facility] Physical Therapy [facility]  [address]  [address]  [place] [address]  Phone:  [phone]  Fax:  [phone]    Date of Evaluation: 2018    Patient: Luis Alfredo Javed  YOB: 1980  MRN: [id number]     Referring Provider: Nicho Tovar M.D.  [address]  [place] [address]   Referring Diagnosis Cellulitis of left axilla [L03.112]     Time Calculation  Start time: 830  Stop time: 930 Time Calculation (min): 60 minutes     Physical Therapy Occurrence Codes    Date physical therapy care plan established or reviewed:  18   Date physical therapy treatment started:  18          Chief Complaint: No chief complaint on file.    Visit Diagnoses     ICD-10-CM   1. Right shoulder pain, unspecified chronicity M25.511   2. Cellulitis of left axilla L03.112         Subjective:   History of Present Illness:     Date of onset:  2017    Mechanism of injury:  Chronic right shoulder pain after using theraband at home.  Intermittent  Denies cervical pain.   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  notices ain more at rest than while working. 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      DM2, hypertension-medication controlled,     Prior level of function:  Not currently working out.    Sleep disturbance:  Not disrupted  Pain:     Current pain rating:  3    At best pain ratin    At worst pain ratin    Location:  Right shoulder pain variable location anterior/posterior/lateral    Quality:  Aching    Relieving factors:  Nothing    Exacerbated by: job related pushing loads, crossing arms in front of ches.    Progression:  Unchanged  Hand dominance:  Right      Past Medical History:   Diagnosis Date   • Chest pain     x 5 years.   • Hypertension    • Type II or unspecified type diabetes mellitus without mention of complication,  not stated as uncontrolled      Past Surgical History:   Procedure Laterality Date   • HERNIA REPAIR  age 4   • STRABISMUS REPAIR  age 5     Social History   Substance Use Topics   • Smoking status: Current Every Day Smoker     Packs/day: 1.50     Years: 21.00     Types: Cigarettes   • Smokeless tobacco: Never Used   • Alcohol use 0.0 oz/week      Comment: 3 to 4 times weekly     Family and Occupational History     Social History   • Marital status:      Spouse name: N/A   • Number of children: N/A   • Years of education: N/A       Objective     Postural Observations  Seated posture: poor  Standing posture: poor    Additional Postural Observation Details  Rounded shoulders protatracted scapula, thoracic kyphosis    Cervical Screen    Cervical range of motion within normal limits  Cervical range of motion within normal limits with the following exceptions: 25% limited right sidebend and rotation and cervical extension  Thoracic Screen    Thoracic range of motion within normal limits  Thoracic range of motion within normal limits with the following exceptions: Limited left thoracic rotation  Mod loss of extension    Palpation     Right   Muscle spasm in the supraspinatus.   Tenderness of the infraspinatus and supraspinatus.     Tenderness     Right Shoulder  Tenderness in the AC joint.     Additional Tenderness Details  + crepitus right AC joint    Active Range of Motion   Left Shoulder   Normal active range of motion  Flexion: 175 degrees   Abduction: 175 degrees   External rotation 0°: 70 degrees   External rotation 90°: 110 degrees   Internal rotation BTB: T8     Right Shoulder   Normal active range of motion  Flexion: 175 degrees   Abduction: 175 degrees   External rotation 0°: 65 degrees   External rotation 90°: 100 degrees  Internal rotation BTB: T12     Passive Range of Motion   Left Shoulder   Normal passive range of motion    Right Shoulder   Normal passive range of motion    Strength:      Left  Shoulder   Planes of Motion   Flexion: 4-   Abduction: 4-     Right Shoulder   Planes of Motion   Flexion: 4   Abduction: 4+     Tests     Right Shoulder   Negative Neer's.     Additional Tests Details  -wiseman  Strong and painful empty can  - infraspinatus          Therapeutic Exercises (CPT 67505):     1. [place] against wall, 2 x 30 sec    2. Posture re ed    3. Scapular and cervical retraction, x 20 each      Time-based treatments/modalities:  Therapeutic exercise minutes (CPT 01268): 10 minutes       Assessment, Response and Plan:   Assessment details:  Patient presents with postural dysfunction and right supraspinatus and infraspinatus tenderness x 1 year secondary to injury while using a theraband.  Symptoms have remained unchanged and are variable.  Patient presents with impaired strength left>right  Scapular stabilizers and impaired strength left UE compared with right.    Barriers to therapy:  None  Goals:   Short Term Goals:   Full painfree AROM right GH joint  Patient able to work asymptomatic >75% of the time  Short term goal time span:  2-4 weeks      Long Term Goals:    Patient symptom free > 75% of day at rest or at work  Independent with home program/return to gym  Long term goal time span:  4-6 weeks    Plan:   Therapy options:  Physical therapy treatment to continue  Planned therapy interventions:  E Stim Unattended (CPT 14152), Mechanical Traction (CPT 45398), Neuromuscular Re-education (CPT 73697), Therapeutic Exercise (CPT 13578) and Manual Therapy (CPT 51361)  Frequency:  1x week  Duration in weeks:  6  Duration in visits:  6  Discussed with:  Patient      Functional Limitation G-Codes and Severity Modifiers  Quickdash General Total Score: 11.36   Current:     Goal:       Referring provider co-signature:  I have reviewed this plan of care and my co-signature certifies the need for services.  Certification Dates:   From 7/24/2018  To 9/4/2018    Physician Signature:  ________________________________ Date: ______________

## 2018-07-26 ENCOUNTER — APPOINTMENT (OUTPATIENT)
Dept: PHYSICAL THERAPY | Facility: REHABILITATION | Age: 38
End: 2018-07-26
Attending: FAMILY MEDICINE
Payer: COMMERCIAL

## 2018-07-30 ENCOUNTER — OCCUPATIONAL MEDICINE (OUTPATIENT)
Dept: OCCUPATIONAL MEDICINE | Facility: CLINIC | Age: 38
End: 2018-07-30
Payer: COMMERCIAL

## 2018-07-30 VITALS
OXYGEN SATURATION: 98 % | HEIGHT: 74 IN | HEART RATE: 92 BPM | TEMPERATURE: 97.2 F | BODY MASS INDEX: 40.43 KG/M2 | SYSTOLIC BLOOD PRESSURE: 132 MMHG | DIASTOLIC BLOOD PRESSURE: 86 MMHG | WEIGHT: 315 LBS

## 2018-07-30 DIAGNOSIS — S93.402D SPRAIN OF LEFT ANKLE, UNSPECIFIED LIGAMENT, SUBSEQUENT ENCOUNTER: ICD-10-CM

## 2018-07-30 PROCEDURE — 99202 OFFICE O/P NEW SF 15 MIN: CPT | Performed by: PREVENTIVE MEDICINE

## 2018-07-30 NOTE — LETTER
[address],   [address] [place] [address]  Phone:  [phone] - Fax:  [phone]   [facility] Progress Report and Disability Certification  Date of Service: 7/30/2018   No Show:  No  Date / Time of Next Visit:  Release from care   Claim Information   Patient Name: Luis Alfredo Javed  Claim Number:     Employer:   [employer] Date of Injury: 7/22/2018     Insurer / TPA: [employer]  ID / SSN:     Occupation:   Diagnosis: The encounter diagnosis was Sprain of left ankle, unspecified ligament, subsequent encounter.    Medical Information   Related to Industrial Injury? Yes    Subjective Complaints:  DOI 7/22/2018: 39 yo male presents with left ankle injury. He was at work and slipped on something on the floor and injured his left ankle and foot.  He was seen in the emergency room, x-rays of the left foot and ankle were negative for acute findings.  Patient states that left ankle pain is much improved.  He says has no pain at this time.  Is able to ambulate without difficulty.  Ready for release and return to duty.   Objective Findings: Left ankle: No gross deformity.  No tenderness.  Full range of motion.  Normal gait.   Pre-Existing Condition(s):     Assessment:   Condition Improved    Status: Discharged /  MMI  Permanent Disability:No    Plan:      Diagnostics:      Comments:  Released from care  Full duty  Follow-up as needed    Disability Information   Status: Released to Full Duty    From:  7/30/2018  Through:   Restrictions are:     Physical Restrictions   Sitting:    Standing:    Stooping:    Bending:      Squatting:    Walking:    Climbing:    Pushing:      Pulling:    Other:    Reaching Above Shoulder (L):   Reaching Above Shoulder (R):       Reaching Below Shoulder (L):    Reaching Below Shoulder (R):      Not to exceed Weight Limits   Carrying(hrs):   Weight Limit(lb):   Lifting(hrs):   Weight  Limit(lb):       Comments:      Repetitive Actions   Hands: i.e. Fine Manipulations from Grasping:     Feet: i.e. Operating Foot Controls:     Driving / Operate Machinery:     Physician Name: Humble Thorne D.O. Physician Signature: HUMBLE Walter D.O. e-Signature: Dr. Rosales Sheppard, Medical Director   Clinic Name / Location: [address],   [address]  [place] [address] Clinic Phone Number: Dept: [phone]   Appointment Time: 9:30 Am Visit Start Time: 9:27 AM   Check-In Time:  9:26 Am Visit Discharge Time:  9:57am   Original-Treating Physician or Chiropractor    Page 2-Insurer/TPA    Page 3-Employer    Page 4-Employee

## 2018-07-30 NOTE — PROGRESS NOTES
"Subjective:      Luis Alfredo Javed is a 38 y.o. male who presents with Ankle Injury (WC 7/22/18 Ankle-better)      DOI 7/22/2018: 37 yo male presents with left ankle injury. He was at work and slipped on something on the floor and injured his left ankle and foot.  He was seen in the emergency room, x-rays of the left foot and ankle were negative for acute findings.  Patient states that left ankle pain is much improved.  He says has no pain at this time.  Is able to ambulate without difficulty.  Ready for release and return to duty.     HPI    ROS  ROS: All systems were reviewed on intake form, form was reviewed and signed. See scanned documents in media. Pertinent positives and negatives included in HPI.    PMH: No pertinent past medical history to this problem  MEDS: Medications were reviewed in Epic  ALLERGIES: No Known Allergies  SOCHX: Works as a  at [employer]  FH: No pertinent family history to this problem     Objective:     /86   Pulse 92   Temp 36.2 °C (97.2 °F)   Ht 1.88 m (6' 2\")   Wt (!) 155.1 kg (342 lb)   SpO2 98%   BMI 43.91 kg/m²      Physical Exam   Constitutional: He is oriented to person, place, and time. He appears well-developed and well-nourished.   Cardiovascular: Normal rate.    Pulmonary/Chest: Effort normal.   Neurological: He is alert and oriented to person, place, and time.   Skin: Skin is warm and dry.   Psychiatric: He has a normal mood and affect. Judgment normal.       Left ankle: No gross deformity.  No tenderness.  Full range of motion.  Normal gait.       Assessment/Plan:     1. Sprain of left ankle, unspecified ligament, subsequent encounter  Released from care  Full duty  Follow-up as needed      "

## 2018-07-31 ENCOUNTER — APPOINTMENT (OUTPATIENT)
Dept: PHYSICAL THERAPY | Facility: REHABILITATION | Age: 38
End: 2018-07-31
Attending: FAMILY MEDICINE
Payer: COMMERCIAL

## 2018-08-01 NOTE — TELEPHONE ENCOUNTER
Was the patient seen in the last year in this department? Yes    Does patient have an active prescription for medications requested? NO      Received Request Via: Pharmacy

## 2018-08-02 ENCOUNTER — PHYSICAL THERAPY (OUTPATIENT)
Dept: PHYSICAL THERAPY | Facility: REHABILITATION | Age: 38
End: 2018-08-02
Attending: FAMILY MEDICINE
Payer: COMMERCIAL

## 2018-08-02 DIAGNOSIS — M25.511 RIGHT SHOULDER PAIN, UNSPECIFIED CHRONICITY: ICD-10-CM

## 2018-08-02 PROCEDURE — 97110 THERAPEUTIC EXERCISES: CPT

## 2018-08-02 RX ORDER — SERTRALINE HYDROCHLORIDE 100 MG/1
TABLET, FILM COATED ORAL
Qty: 30 TAB | Refills: 1 | Status: SHIPPED | OUTPATIENT
Start: 2018-08-02 | End: 2018-09-01

## 2018-08-02 NOTE — OP THERAPY DAILY TREATMENT
Outpatient Physical Therapy  DAILY TREATMENT     [facility] Physical [address]  [address]  [place] [address]  Phone:  [phone]  Fax:  [phone]    Date: 08/02/2018    Patient: Luis Alfredo Javed  YOB: 1980  MRN: [id number]     Time Calculation  Start time: 1000  Stop time: 1034 Time Calculation (min): 34 minutes     Chief Complaint: No chief complaint on file.    Visit #: 2    SUBJECTIVE:  No time to do home exercises, no change in symptoms    OBJECTIVE:  Current objective measures:           Therapeutic Exercises (CPT 27865):     1. [place] orange TB, 2 x 30 sec on wall with cervical retraction    2. [place] scap clock, x 10 each direction    3. Foam roller pec stretch, 3 x 30 sec    4. Foam roller alt shoulder flexiin, horizonttal abduction, x 30 each    5. Serratus press with roller on wall/shoulder flexion    Therapeutic Treatments and Modalities:     1. Manual Therapy (CPT 66257), iastm right supraspinatus,    Time-based treatments/modalities:  Manual therapy minutes (CPT 39882): 5 minutes  Therapeutic exercise minutes (CPT 21056): 30 minutes       Pain rating before treatment: 3  Pain rating after treatment: 0    ASSESSMENT:   Response to treatment: painfree full AROM post there ex, fatigues quickly    PLAN/RECOMMENDATIONS:   Plan for treatment: therapy treatment to continue next visit.  Planned interventions for next visit: continue with current treatment.

## 2018-08-07 ENCOUNTER — PHYSICAL THERAPY (OUTPATIENT)
Dept: PHYSICAL THERAPY | Facility: REHABILITATION | Age: 38
End: 2018-08-07
Attending: FAMILY MEDICINE
Payer: COMMERCIAL

## 2018-08-07 DIAGNOSIS — M25.511 RIGHT SHOULDER PAIN, UNSPECIFIED CHRONICITY: ICD-10-CM

## 2018-08-07 PROCEDURE — 97014 ELECTRIC STIMULATION THERAPY: CPT

## 2018-08-07 PROCEDURE — 97110 THERAPEUTIC EXERCISES: CPT

## 2018-08-07 NOTE — OP THERAPY DAILY TREATMENT
Outpatient Physical Therapy  DAILY TREATMENT     [facility] Physical Therapy [address]  [address]  [place] [address]  Phone:  [phone]  Fax:  [phone]    Date: 08/07/2018    Patient: Luis Alfredo Javed  YOB: 1980  MRN: [id number]     Time Calculation  Start time: 1000  Stop time: 1050 Time Calculation (min): 50 minutes     Chief Complaint: No chief complaint on file.    Visit #: 3    SUBJECTIVE:not feeling shoulder symptoms but everything else hurts; no pain at work      OBJECTIVE:  Current objective measures:           Therapeutic Exercises (CPT 84715):     1. [place] orange TB, 2 x 30 sec on wall with cervical retraction    2. [place] scap clock, x 10 each direction    3. Foam roller pec stretch, 3 x 30 sec    4. Foam roller alt shoulder flexiin, horizonttal abduction, x 30 each    5. Serratus press with roller on wall/shoulder flexion    6. Ski jumper, 4 x 30 sec    7. [place] wall slide with lift off, x 15    8. Box low trap no resistance, x 15    Therapeutic Treatments and Modalities:     1. E Stim Unattended (CPT 19167), Russian ball roll on wall infraspinatus    Time-based treatments/modalities:  Therapeutic exercise minutes (CPT 12305): 30 minutes       Pain rating before treatment: 1  Pain rating after treatment: 0    ASSESSMENT:   Response to treatment: LUE weakness limits progression of box  exercise for right , poor scap/low trap strength bilaterally    PLAN/RECOMMENDATIONS:   Plan for treatment: therapy treatment to continue next visit.  Planned interventions for next visit: continue with current treatment.

## 2018-08-09 ENCOUNTER — APPOINTMENT (OUTPATIENT)
Dept: PHYSICAL THERAPY | Facility: REHABILITATION | Age: 38
End: 2018-08-09
Attending: FAMILY MEDICINE
Payer: COMMERCIAL

## 2018-08-13 ENCOUNTER — PHYSICAL THERAPY (OUTPATIENT)
Dept: PHYSICAL THERAPY | Facility: REHABILITATION | Age: 38
End: 2018-08-13
Attending: FAMILY MEDICINE
Payer: COMMERCIAL

## 2018-08-13 DIAGNOSIS — M25.511 RIGHT SHOULDER PAIN, UNSPECIFIED CHRONICITY: ICD-10-CM

## 2018-08-13 DIAGNOSIS — L03.112 CELLULITIS OF LEFT AXILLA: ICD-10-CM

## 2018-08-13 PROCEDURE — 97110 THERAPEUTIC EXERCISES: CPT

## 2018-08-13 NOTE — OP THERAPY DAILY TREATMENT
Outpatient Physical Therapy  DAILY TREATMENT     [facility] Physical [address]  [address]  [place] [address]  Phone:  [phone]  Fax:  [phone]    Date: 08/13/2018    Patient: Luis Alfredo Javed  YOB: 1980  MRN: [id number]     Time Calculation  Start time: 1105  Stop time: 1135 Time Calculation (min): 30 minutes     Chief Complaint: No chief complaint on file.    Visit #: 4    SUBJECTIVE: Havnt had time to do home program.  Increase right shoulder pain after work yesterday. Probably lift equipment overhead more than usual.  10/10 VAS      OBJECTIVE:  Current objective measures:           Therapeutic Exercises (CPT 11747):     1. [place] orange TB, 2 x 30 sec on wall with cervical retraction    2. Siely[place] scap clock, x 10 each direction    3. Foam roller pec stretch, 3 x 30 sec    4. Foam roller alt shoulder flexiin, horizonttal abduction, x 30 each    5. Serratus press with roller on wall/shoulder flexion    Therapeutic Treatments and Modalities:     1. E Stim Unattended (CPT 49279), Russian ball roll on wall infraspinatus    Time-based treatments/modalities:  Therapeutic exercise minutes (CPT 85183): 30 minutes       Pain rating before treatment: 8  Pain rating after treatment: 0    ASSESSMENT:   Response to treatment: Poor compliance with PT home program, tenderness with palpation right supraspinatus and C6 unilateral PA.  Poor posture despite cueing and poor motivation     PLAN/RECOMMENDATIONS:   Plan for treatment: therapy treatment to continue next visit.  Planned interventions for next visit: continue with current treatment.

## 2018-08-16 RX ORDER — SULFAMETHOXAZOLE AND TRIMETHOPRIM 800; 160 MG/1; MG/1
TABLET ORAL
Qty: 28 TAB | Refills: 0 | Status: SHIPPED | OUTPATIENT
Start: 2018-08-16 | End: 2018-09-01

## 2018-08-16 RX ORDER — NAPROXEN 500 MG/1
500 TABLET ORAL 2 TIMES DAILY WITH MEALS
Qty: 60 TAB | Refills: 3 | Status: SHIPPED | OUTPATIENT
Start: 2018-08-16 | End: 2018-09-01

## 2018-09-01 ENCOUNTER — HOSPITAL ENCOUNTER (EMERGENCY)
Facility: MEDICAL CENTER | Age: 38
End: 2018-09-01
Attending: EMERGENCY MEDICINE
Payer: COMMERCIAL

## 2018-09-01 VITALS
TEMPERATURE: 97.3 F | HEART RATE: 91 BPM | WEIGHT: 315 LBS | OXYGEN SATURATION: 95 % | HEIGHT: 74 IN | BODY MASS INDEX: 40.43 KG/M2 | SYSTOLIC BLOOD PRESSURE: 140 MMHG | DIASTOLIC BLOOD PRESSURE: 85 MMHG | RESPIRATION RATE: 17 BRPM

## 2018-09-01 DIAGNOSIS — B34.9 VIRAL SYNDROME: ICD-10-CM

## 2018-09-01 LAB
ALBUMIN SERPL BCP-MCNC: 3.6 G/DL (ref 3.2–4.9)
ALBUMIN/GLOB SERPL: 1.3 G/DL
ALP SERPL-CCNC: 89 U/L (ref 30–99)
ALT SERPL-CCNC: 33 U/L (ref 2–50)
ANION GAP SERPL CALC-SCNC: 3 MMOL/L (ref 0–11.9)
AST SERPL-CCNC: 20 U/L (ref 12–45)
BASOPHILS # BLD AUTO: 0.8 % (ref 0–1.8)
BASOPHILS # BLD: 0.06 K/UL (ref 0–0.12)
BILIRUB SERPL-MCNC: 0.5 MG/DL (ref 0.1–1.5)
BUN SERPL-MCNC: 11 MG/DL (ref 8–22)
CALCIUM SERPL-MCNC: 9.4 MG/DL (ref 8.4–10.2)
CHLORIDE SERPL-SCNC: 105 MMOL/L (ref 96–112)
CO2 SERPL-SCNC: 27 MMOL/L (ref 20–33)
CREAT SERPL-MCNC: 0.65 MG/DL (ref 0.5–1.4)
EOSINOPHIL # BLD AUTO: 0.09 K/UL (ref 0–0.51)
EOSINOPHIL NFR BLD: 1.2 % (ref 0–6.9)
ERYTHROCYTE [DISTWIDTH] IN BLOOD BY AUTOMATED COUNT: 46.7 FL (ref 35.9–50)
GLOBULIN SER CALC-MCNC: 2.8 G/DL (ref 1.9–3.5)
GLUCOSE SERPL-MCNC: 142 MG/DL (ref 65–99)
HCT VFR BLD AUTO: 39.6 % (ref 42–52)
HETEROPH AB SER QL: NEGATIVE
HGB BLD-MCNC: 13.5 G/DL (ref 14–18)
IMM GRANULOCYTES # BLD AUTO: 0.07 K/UL (ref 0–0.11)
IMM GRANULOCYTES NFR BLD AUTO: 0.9 % (ref 0–0.9)
LYMPHOCYTES # BLD AUTO: 2.59 K/UL (ref 1–4.8)
LYMPHOCYTES NFR BLD: 33.5 % (ref 22–41)
MCH RBC QN AUTO: 32.5 PG (ref 27–33)
MCHC RBC AUTO-ENTMCNC: 34.1 G/DL (ref 33.7–35.3)
MCV RBC AUTO: 95.2 FL (ref 81.4–97.8)
MONOCYTES # BLD AUTO: 0.6 K/UL (ref 0–0.85)
MONOCYTES NFR BLD AUTO: 7.8 % (ref 0–13.4)
NEUTROPHILS # BLD AUTO: 4.31 K/UL (ref 1.82–7.42)
NEUTROPHILS NFR BLD: 55.8 % (ref 44–72)
NRBC # BLD AUTO: 0 K/UL
NRBC BLD-RTO: 0 /100 WBC
PLATELET # BLD AUTO: 169 K/UL (ref 164–446)
PMV BLD AUTO: 9.7 FL (ref 9–12.9)
POTASSIUM SERPL-SCNC: 4.1 MMOL/L (ref 3.6–5.5)
PROT SERPL-MCNC: 6.4 G/DL (ref 6–8.2)
RBC # BLD AUTO: 4.16 M/UL (ref 4.7–6.1)
S PYO AG THROAT QL: NORMAL
SIGNIFICANT IND 70042: NORMAL
SITE SITE: NORMAL
SODIUM SERPL-SCNC: 135 MMOL/L (ref 135–145)
SOURCE SOURCE: NORMAL
WBC # BLD AUTO: 7.7 K/UL (ref 4.8–10.8)

## 2018-09-01 PROCEDURE — 80053 COMPREHEN METABOLIC PANEL: CPT

## 2018-09-01 PROCEDURE — 87081 CULTURE SCREEN ONLY: CPT

## 2018-09-01 PROCEDURE — 86308 HETEROPHILE ANTIBODY SCREEN: CPT

## 2018-09-01 PROCEDURE — 85025 COMPLETE CBC W/AUTO DIFF WBC: CPT

## 2018-09-01 PROCEDURE — 87880 STREP A ASSAY W/OPTIC: CPT

## 2018-09-01 PROCEDURE — 99284 EMERGENCY DEPT VISIT MOD MDM: CPT

## 2018-09-01 PROCEDURE — 96360 HYDRATION IV INFUSION INIT: CPT

## 2018-09-01 PROCEDURE — 700105 HCHG RX REV CODE 258: Performed by: EMERGENCY MEDICINE

## 2018-09-01 RX ORDER — SODIUM CHLORIDE 9 MG/ML
1000 INJECTION, SOLUTION INTRAVENOUS ONCE
Status: COMPLETED | OUTPATIENT
Start: 2018-09-01 | End: 2018-09-01

## 2018-09-01 RX ORDER — SERTRALINE HYDROCHLORIDE 100 MG/1
100 TABLET, FILM COATED ORAL EVERY EVENING
Status: SHIPPED | COMMUNITY
End: 2019-09-13

## 2018-09-01 RX ADMIN — SODIUM CHLORIDE 1000 ML: 9 INJECTION, SOLUTION INTRAVENOUS at 11:39

## 2018-09-01 NOTE — ED PROVIDER NOTES
"ED Provider Note    CHIEF COMPLAINT  Chief Complaint   Patient presents with   • Weakness     Pt started feeling weak yesterday and it continued into today.    • Sore Throat     Throat hurt last night   • Malaise     Has been sleeping a lot for the last couple days.        JD Javed is a 38 y.o. male who presents complaining of feeling poorly.  He has been feeling generally very tired, sleeping quite a bit starting on Wednesday.  Yesterday he felt tired, somewhat groggy, his symptoms got progressively worse through the day.  He has had a more more achiness, sore throat.  He is not sure if he had a fever.  His wife states he was coughing through the night but he has had no cough today.  Denies any chest pain or shortness of breath.  He just feels very run down.  No rashes.  No belly pain.  No change in bowel or bladder.  He thinks he is doing \"okay\" with staying hydrated.  His wife is just now developing a sore throat.  There is no other complaint.    PAST MEDICAL HISTORY  Past Medical History:   Diagnosis Date   • Chest pain     x 5 years.   • Hypertension    • Type II or unspecified type diabetes mellitus without mention of complication, not stated as uncontrolled        FAMILY HISTORY  Family History   Problem Relation Age of Onset   • Diabetes Mother    • Stroke Father    • Alcohol/Drug Maternal Grandfather    • Diabetes Paternal Grandfather    • Cancer Paternal Grandfather         lung   • Heart Disease Neg Hx        SOCIAL HISTORY  Social History   Substance Use Topics   • Smoking status: Current Every Day Smoker     Packs/day: 1.50     Years: 21.00     Types: Cigarettes   • Smokeless tobacco: Never Used   • Alcohol use No         SURGICAL HISTORY  Past Surgical History:   Procedure Laterality Date   • HERNIA REPAIR  age 4   • STRABISMUS REPAIR  age 5       CURRENT MEDICATIONS    I have reviewed the nurses notes and/or the list brought with the patient.    ALLERGIES  No Known Allergies    REVIEW " "OF SYSTEMS  See HPI for further details. Review of systems as above, otherwise all other systems are negative.     PHYSICAL EXAM  VITAL SIGNS: /85   Pulse 98   Temp 36.3 °C (97.3 °F)   Resp 17   Ht 1.88 m (6' 2\")   Wt (!) 162.9 kg (359 lb 2.1 oz) Comment: RN notified  SpO2 95%   BMI 46.11 kg/m²     Constitutional: Somewhat tired but otherwise well appearing patient in no acute distress.  Not toxic, nor ill in appearance.  HENT: Mucus membranes quite dry.  Oropharynx is notable for some erythema over the tonsillar pillars.  There is no leukoplakia.  Eyes: Pupils equally round.  No scleral icterus.   Neck: Full nontender range of motion.  There is no meningismus.  Lymphatic: No cervical lymphadenopathy noted.   Cardiovascular: Regular heart rate and rhythm.  No murmurs, rubs, nor gallop appreciated.   Thorax & Lungs: Chest is nontender.  Lungs are clear to auscultation with good air movement bilaterally.  No wheeze, rhonchi, nor rales.   Abdomen: Soft, with no tenderness, rebound nor guarding.  No mass, pulsatile mass, nor hepatosplenomegaly appreciated.  Skin: No purpura nor petechia noted.  Extremities/Musculoskeletal: No sign of trauma.  Calves are nontender with no cords nor edema.  No Criss's sign.  Pulses are intact all around.   Neurologic: Alert & oriented.  Strength and sensation is intact all around.  Gait is normal.  Psychiatric: Normal affect appropriate for the clinical situation.    LABS  Labs Reviewed   CBC WITH DIFFERENTIAL - Abnormal; Notable for the following:        Result Value    RBC 4.16 (*)     Hemoglobin 13.5 (*)     Hematocrit 39.6 (*)     All other components within normal limits   COMP METABOLIC PANEL - Abnormal; Notable for the following:     Glucose 142 (*)     All other components within normal limits   HETEROPHILE AB SCREEN   RAPID STREP,CULT IF INDICATED   ESTIMATED GFR   BETA STREP SCREEN (GP. A)       MEDICAL RECORD  I have reviewed patient's medical record and pertinent " results are listed above.    COURSE & MEDICAL DECISION MAKING  I have reviewed any medical record information, laboratory studies and radiographic results as noted above.  Patient presents with a bit of a sore throat, malaise, generally feeling poorly.  I suspect that he has a viral upper respiratory infection.  However, given the severity of his symptoms I did want to go ahead and check laboratories.  These are unrevealing.  He has no evidence of leukocytosis or shift.  He has minimal anemia.  He is advised to follow-up with his PCP about this.  He also has some mild hyperglycemia however, I do note that he has history of diabetes on medication.  His rapid strep is negative, cultures pending.  Monospot is negative.  There is no evidence of hepatic or renal insufficiency.    IVF are given because of dry mucous membranes and initially an elevated heart rate of 98.  Upon recheck, he is improved.    Recommend rest for the next 2 or 3 days.  Plenty of fluids.  Plenty of rest.  He is given a work note, and instructions on viral syndrome.  Return to the ER for any turn for the worse.      FINAL IMPRESSION  1. Viral syndrome           This dictation was created using voice recognition software.    Electronically signed by: Meño Echeverria, 9/1/2018 11:23 AM

## 2018-09-01 NOTE — ED NOTES
The Medication Reconciliation process has been completed by interviewing the patient    Allergies have been reviewed  Antibiotic use in 30 days - septra, end of July    Home Pharmacy:  [facility]

## 2018-09-01 NOTE — ED NOTES
".  Chief Complaint   Patient presents with   • Weakness     Pt started feeling weak yesterday and it continued into today.    • Sore Throat     Throat hurt last night   • Malaise     Has been sleeping a lot for the last couple days.      ./85   Pulse 98   Temp 36.3 °C (97.3 °F)   Resp 17   Ht 1.88 m (6' 2\")   Wt (!) 162.9 kg (359 lb 2.1 oz) Comment: RN notified  SpO2 95%   BMI 46.11 kg/m²     "

## 2018-09-01 NOTE — ED NOTES
.Pt D/C to home. VSS. IV removed. D/C instructions and work note given to patient. Pt verbalizes understanding. Pt leaves ED with no acute changes, complaints or concerns. Pt ambulated out with a steady gait and all belongings.

## 2018-09-01 NOTE — DISCHARGE INSTRUCTIONS
"Viral Syndrome    Initial strep test is normal--culture pending.  Labs show that you are slightly anemic and your blood sugar is a little high--Make sure Dr Tovar follows up on this in the next few weeks.  Plenty of fluids, plenty of rest.  Return to ER for any turn for the worse.      You or your child has Viral Syndrome. It is the most common infection causing \"colds\" and infections in the nose, throat, sinuses, and breathing tubes. Sometimes the infection causes nausea, vomiting, or diarrhea. The germ that causes the infection is a virus. No antibiotic or other medicine will kill it. There are medicines that you can take to make you or your child more comfortable.   HOME CARE INSTRUCTIONS   · Rest in bed until you start to feel better.   · If you have diarrhea or vomiting, eat small amounts of crackers and toast. Soup is helpful.   · Do not give aspirin or medicine that contains aspirin to children.   · Only take over-the-counter or prescription medicines for pain, discomfort, or fever as directed by your caregiver.   SEEK IMMEDIATE MEDICAL CARE IF:   · You or your child has not improved within one week.   · You or your child has pain that is not at least partially relieved by over-the-counter medicine.   · Thick, colored mucus or blood is coughed up.   · Discharge from the nose becomes thick yellow or green.   · Diarrhea or vomiting gets worse.   · There is any major change in your or your child's condition.   · You or your child develops a skin rash, stiff neck, severe headache, or are unable to hold down food or fluid.   · You or your child has an oral temperature above 102° F (38.9° C), not controlled by medicine.   · Your baby is older than 3 months with a rectal temperature of 102° F (38.9° C) or higher.   · Your baby is 3 months old or younger with a rectal temperature of 100.4° F (38° C) or higher.   Document Released: 12/03/2007 Document Revised: 03/11/2013 Document Reviewed: 12/03/2008  ExitCare® " Patient Information ©2013 [facility].

## 2018-09-03 LAB
S PYO SPEC QL CULT: NORMAL
SIGNIFICANT IND 70042: NORMAL
SITE SITE: NORMAL
SOURCE SOURCE: NORMAL

## 2018-09-05 ENCOUNTER — HOSPITAL ENCOUNTER (OUTPATIENT)
Dept: LAB | Facility: MEDICAL CENTER | Age: 38
End: 2018-09-05
Attending: FAMILY MEDICINE
Payer: COMMERCIAL

## 2018-09-05 DIAGNOSIS — E11.69 DIABETES MELLITUS TYPE 2 IN OBESE (HCC): ICD-10-CM

## 2018-09-05 DIAGNOSIS — E66.9 DIABETES MELLITUS TYPE 2 IN OBESE (HCC): ICD-10-CM

## 2018-09-05 LAB
ALBUMIN SERPL BCP-MCNC: 4.6 G/DL (ref 3.2–4.9)
ALBUMIN/GLOB SERPL: 1.5 G/DL
ALP SERPL-CCNC: 99 U/L (ref 30–99)
ALT SERPL-CCNC: 52 U/L (ref 2–50)
ANION GAP SERPL CALC-SCNC: 5 MMOL/L (ref 0–11.9)
AST SERPL-CCNC: 25 U/L (ref 12–45)
BILIRUB SERPL-MCNC: 0.4 MG/DL (ref 0.1–1.5)
BUN SERPL-MCNC: 14 MG/DL (ref 8–22)
CALCIUM SERPL-MCNC: 9.5 MG/DL (ref 8.5–10.5)
CHLORIDE SERPL-SCNC: 103 MMOL/L (ref 96–112)
CO2 SERPL-SCNC: 27 MMOL/L (ref 20–33)
CREAT SERPL-MCNC: 0.75 MG/DL (ref 0.5–1.4)
CREAT UR-MCNC: 39.7 MG/DL
EST. AVERAGE GLUCOSE BLD GHB EST-MCNC: 137 MG/DL
GLOBULIN SER CALC-MCNC: 3 G/DL (ref 1.9–3.5)
GLUCOSE SERPL-MCNC: 89 MG/DL (ref 65–99)
HBA1C MFR BLD: 6.4 % (ref 0–5.6)
MICROALBUMIN UR-MCNC: 0.9 MG/DL
MICROALBUMIN/CREAT UR: 23 MG/G (ref 0–30)
POTASSIUM SERPL-SCNC: 4.4 MMOL/L (ref 3.6–5.5)
PROT SERPL-MCNC: 7.6 G/DL (ref 6–8.2)
SODIUM SERPL-SCNC: 135 MMOL/L (ref 135–145)

## 2018-09-05 PROCEDURE — 83036 HEMOGLOBIN GLYCOSYLATED A1C: CPT

## 2018-09-05 PROCEDURE — 80053 COMPREHEN METABOLIC PANEL: CPT

## 2018-09-05 PROCEDURE — 36415 COLL VENOUS BLD VENIPUNCTURE: CPT

## 2018-09-05 PROCEDURE — 82043 UR ALBUMIN QUANTITATIVE: CPT

## 2018-09-05 PROCEDURE — 82570 ASSAY OF URINE CREATININE: CPT

## 2018-09-10 ENCOUNTER — APPOINTMENT (OUTPATIENT)
Dept: PHYSICAL THERAPY | Facility: REHABILITATION | Age: 38
End: 2018-09-10
Attending: FAMILY MEDICINE
Payer: COMMERCIAL

## 2018-09-10 RX ORDER — SULFAMETHOXAZOLE AND TRIMETHOPRIM 800; 160 MG/1; MG/1
TABLET ORAL
Refills: 0 | OUTPATIENT
Start: 2018-09-10

## 2018-09-10 NOTE — OP THERAPY DAILY TREATMENT
"  Outpatient Physical Therapy  DAILY TREATMENT     [facility] [address]  [address]  [place] [address]  Phone:  [phone]  Fax:  [phone]    Date: 09/10/2018    Patient: Luis Alfredo Javed  YOB: 1980  MRN: [id number]     Time Calculation             Chief Complaint: No chief complaint on file.    Visit #: 5    SUBJECTIVE:  ***    OBJECTIVE:  Current objective measures: ***        Exercises/Treatment  Time-based treatments/modalities:          Pain rating before treatment: {PAIN NUMBERS_1-10:54051}  Pain rating after treatment: {PAIN NUMBERS_1-10:68016}    ASSESSMENT:   Response to treatment: ***    PLAN/RECOMMENDATIONS:   Plan for treatment: {AMB OP THERAPY - THERAPY PLAN:326502092::\"therapy treatment to continue next visit\"}.  Planned interventions for next visit: {PT PLANNED THERAPY INTERVENTIONS:579955917}.      "

## 2018-09-13 ENCOUNTER — OFFICE VISIT (OUTPATIENT)
Dept: MEDICAL GROUP | Facility: MEDICAL CENTER | Age: 38
End: 2018-09-13
Attending: FAMILY MEDICINE
Payer: COMMERCIAL

## 2018-09-13 ENCOUNTER — APPOINTMENT (OUTPATIENT)
Dept: PHYSICAL THERAPY | Facility: REHABILITATION | Age: 38
End: 2018-09-13
Attending: FAMILY MEDICINE
Payer: COMMERCIAL

## 2018-09-13 VITALS
BODY MASS INDEX: 40.43 KG/M2 | TEMPERATURE: 99.6 F | DIASTOLIC BLOOD PRESSURE: 72 MMHG | SYSTOLIC BLOOD PRESSURE: 124 MMHG | HEART RATE: 108 BPM | RESPIRATION RATE: 18 BRPM | OXYGEN SATURATION: 95 % | WEIGHT: 315 LBS | HEIGHT: 74 IN

## 2018-09-13 DIAGNOSIS — E66.01 MORBID OBESITY (HCC): ICD-10-CM

## 2018-09-13 DIAGNOSIS — R53.83 OTHER FATIGUE: ICD-10-CM

## 2018-09-13 DIAGNOSIS — E11.69 DIABETES MELLITUS TYPE 2 IN OBESE (HCC): ICD-10-CM

## 2018-09-13 DIAGNOSIS — E66.9 DIABETES MELLITUS TYPE 2 IN OBESE (HCC): ICD-10-CM

## 2018-09-13 DIAGNOSIS — D64.9 NORMOCYTIC ANEMIA: ICD-10-CM

## 2018-09-13 PROCEDURE — 99213 OFFICE O/P EST LOW 20 MIN: CPT | Performed by: FAMILY MEDICINE

## 2018-09-13 PROCEDURE — 99214 OFFICE O/P EST MOD 30 MIN: CPT | Performed by: FAMILY MEDICINE

## 2018-09-13 RX ORDER — PHENTERMINE HYDROCHLORIDE 37.5 MG/1
37.5 CAPSULE ORAL EVERY MORNING
Qty: 30 CAP | Refills: 2 | Status: SHIPPED | OUTPATIENT
Start: 2018-09-13 | End: 2018-12-13

## 2018-09-13 ASSESSMENT — PAIN SCALES - GENERAL: PAINLEVEL: NO PAIN

## 2018-09-14 NOTE — ASSESSMENT & PLAN NOTE
Patient is compliant taking metformin 500 mg twice daily. He reports he has been hitting soda which is available free at his work. He denies any low blood sugar reactions characterized by headache or diaphoresis. Recent A1c improved to 6.4 despite his weight gain.

## 2018-09-14 NOTE — ASSESSMENT & PLAN NOTE
Patient has gained 18 pounds in the past 6 weeks. He does report he is taking advantage of free soda at work. Also reports that 2-3 days per week he will hit fast food for dinner because of conflicts with his schedule and that of his wife who is also working full-time. Patient denies unexplained hair loss or cold intolerance.

## 2018-09-14 NOTE — ASSESSMENT & PLAN NOTE
Recent blood work, 9/1/18, obtained in the emergency room is notable for mild normocytic normochromic anemia with a hemoglobin of 13.5. Patient's had several past CBCs in the last 24 months with no similar anemia results. Patient denies black stools, bloody stools, epistaxis, bloody urine. He notes fatigue but denies dyspnea or chest pain with activity.

## 2018-09-14 NOTE — PROGRESS NOTES
Chief Complaint   Patient presents with   • Follow-Up       HISTORY OF PRESENT ILLNESS: Patient is a 38 y.o. male established patient who presents today to follow-up on the new anemia, type 2 diabetes mellitus, morbid obesity        Normocytic anemia  Recent blood work, 9/1/18, obtained in the emergency room is notable for mild normocytic normochromic anemia with a hemoglobin of 13.5. Patient's had several past CBCs in the last 24 months with no similar anemia results. Patient denies black stools, bloody stools, epistaxis, bloody urine. He notes fatigue but denies dyspnea or chest pain with activity.    Diabetes mellitus type 2 in obese  Patient is compliant taking metformin 500 mg twice daily. He reports he has been hitting soda which is available free at his work. He denies any low blood sugar reactions characterized by headache or diaphoresis. Recent A1c improved to 6.4 despite his weight gain.    Morbid obesity  Patient has gained 18 pounds in the past 6 weeks. He does report he is taking advantage of free soda at work. Also reports that 2-3 days per week he will hit fast food for dinner because of conflicts with his schedule and that of his wife who is also working full-time. Patient denies unexplained hair loss or cold intolerance.    Social history-working 12 hour shifts at [employer].   Patient Active Problem List    Diagnosis Date Noted   • Normocytic anemia 09/13/2018   • Cellulitis of left axilla 07/06/2018   • Rotator cuff tendonitis, right 07/06/2018   • Acute pain of both shoulders 09/07/2017   • Atypical chest pain 06/30/2017   • Alcohol abuse, episodic 01/19/2017   • Acute right flank pain 01/19/2017   • Left sided sciatica 01/03/2017   • Cellulitis of extremity 01/03/2017   • Otalgia of both ears 08/30/2016   • Sleep apnea in adult 06/06/2016   • Anxiety 01/26/2016   • Morbid obesity (HCC) 09/22/2015   • Tobacco abuse counseling 08/27/2015   • GERD (gastroesophageal reflux disease) 04/06/2015    • Hypertriglyceridemia 01/05/2015   • Diabetes mellitus type 2 in obese (HCC) 12/22/2014   • HTN (hypertension) 12/10/2014   • Chest pain 12/10/2014       Allergies:Patient has no known allergies.    Current Outpatient Prescriptions   Medication Sig Dispense Refill   • phentermine 37.5 MG capsule Take 1 Cap by mouth every morning for 91 days. 30 Cap 2   • sertraline (ZOLOFT) 100 MG Tab Take 100 mg by mouth every evening.     • gabapentin (NEURONTIN) 300 MG Cap Take 300 mg by mouth 2 Times a Day.     • naproxen (NAPROSYN) 500 MG Tab Take 500 mg by mouth 2 times a day as needed.     • sulfamethoxazole-trimethoprim (BACTRIM DS) 800-160 MG tablet Take 1 Tab by mouth 2 times a day. Pt stated on 7/6/2018 for 7 day course.     • simvastatin (ZOCOR) 10 MG Tab Take 1 Tab by mouth every evening. 30 Tab 11   • metoprolol SR (TOPROL XL) 50 MG TABLET SR 24 HR Take 1 Tab by mouth every day. TAKE 1 TAB BY MOUTH EVERY DAY. 30 Tab 9   • ranitidine (ZANTAC) 300 MG tablet Take 1 Tab by mouth every day. TAKE 1 TAB BY MOUTH EVERY DAY. 30 Tab 6   • metFORMIN (GLUCOPHAGE) 500 MG Tab Take 2 Tabs by mouth 2 times a day, with meals. 120 Tab 11   • lisinopril (PRINIVIL) 10 MG Tab TAKE 1 TABLET BY MOUTH EVERY DAY 30 Tab 6   • pioglitazone (ACTOS) 45 MG Tab Take 1 Tab by mouth every day. 30 Tab 11   • Omega-3 Fatty Acids (FISH OIL) 1000 MG Cap capsule Take 1,000 mg by mouth every day.     • B Complex-C (SUPER B COMPLEX PO) Take 1 Tab by mouth every day.     • multivitamin (THERAGRAN) Tab Take 1 Tab by mouth every day.     • aspirin EC (ECOTRIN) 81 MG TBEC Take 81 mg by mouth every day.       No current facility-administered medications for this visit.        Social History   Substance Use Topics   • Smoking status: Current Every Day Smoker     Packs/day: 1.50     Years: 21.00     Types: Cigarettes   • Smokeless tobacco: Never Used   • Alcohol use No       Family History   Problem Relation Age of Onset   • Diabetes Mother    • Stroke Father   "  • Alcohol/Drug Maternal Grandfather    • Diabetes Paternal Grandfather    • Cancer Paternal Grandfather         lung   • Heart Disease Neg Hx        ROS:  Review of Systems   Constitutional: Negative for fever, chills, weight loss and positive for generalized fatigue ×6 weeks  Eyes: Negative for blurred vision.   Respiratory: Negative for cough, sputum production, shortness of breath and wheezing.    Cardiovascular: Negative for chest pain, palpitations, orthopnea and leg swelling.   Gastrointestinal: Negative for heartburn, nausea, vomiting and abdominal pain.   Musculoskeletal: Negative for myalgias, back pain and joint pain.   Endo/Heme/Allergies: Does not bruise/bleed easily.               Exam:  Blood pressure 124/72, pulse (!) 108, temperature 37.6 °C (99.6 °F), resp. rate 18, height 1.88 m (6' 2.02\"), weight (!) 163.3 kg (360 lb), SpO2 95 %.  General:  Well nourished, well developed male in NAD  Head is grossly normal.  Neck: Supple without JVD or bruit. Thyroid is not enlarged. Trachea is midline.  Pulmonary: Clear to ausculation .  Normal effort. No rales, ronchi, or wheezing.  Cardiovascular: Regular rate and rhythm without murmur.  Abdomen-Abdomen is soft, normal bowel sounds, no masses, guarding, ororganomegaly, or tenderness.  Lower extremities- neg for pretibial edema, redness, tenderness.  Skin-negative for pallor, pink conjunctiva    Please note that this dictation was created using voice recognition software. I have made every reasonable attempt to correct obvious errors, but I expect that there are errors of grammar and possibly content that I did not discover before finalizing the note.    Assessment/Plan:  1. Diabetes mellitus type 2 in obese (HCC)     2. Morbid obesity (HCC)  phentermine 37.5 MG capsule   3. Normocytic anemia  FOLATE    VITAMIN B12    CBC WITH DIFFERENTIAL    FERRITIN   4. Other fatigue  TSH     Plan: 1. Patient strongly encouraged to discontinue all soda intake  2. Repeat " CBC, B-12, folate, ferritin level prior to next visit in one month  3. Restart phentermine 37.5 mg once each morning to assist with weight loss

## 2018-10-08 ENCOUNTER — HOSPITAL ENCOUNTER (OUTPATIENT)
Dept: LAB | Facility: MEDICAL CENTER | Age: 38
End: 2018-10-08
Attending: FAMILY MEDICINE
Payer: COMMERCIAL

## 2018-10-08 DIAGNOSIS — R53.83 OTHER FATIGUE: ICD-10-CM

## 2018-10-08 DIAGNOSIS — D64.9 NORMOCYTIC ANEMIA: ICD-10-CM

## 2018-10-08 LAB
BASOPHILS # BLD AUTO: 0.6 % (ref 0–1.8)
BASOPHILS # BLD: 0.05 K/UL (ref 0–0.12)
EOSINOPHIL # BLD AUTO: 0.23 K/UL (ref 0–0.51)
EOSINOPHIL NFR BLD: 2.9 % (ref 0–6.9)
ERYTHROCYTE [DISTWIDTH] IN BLOOD BY AUTOMATED COUNT: 48.8 FL (ref 35.9–50)
FERRITIN SERPL-MCNC: 111.1 NG/ML (ref 22–322)
FOLATE SERPL-MCNC: 14.6 NG/ML
HCT VFR BLD AUTO: 46.1 % (ref 42–52)
HGB BLD-MCNC: 15.2 G/DL (ref 14–18)
IMM GRANULOCYTES # BLD AUTO: 0.05 K/UL (ref 0–0.11)
IMM GRANULOCYTES NFR BLD AUTO: 0.6 % (ref 0–0.9)
LYMPHOCYTES # BLD AUTO: 2.49 K/UL (ref 1–4.8)
LYMPHOCYTES NFR BLD: 31.3 % (ref 22–41)
MCH RBC QN AUTO: 32.3 PG (ref 27–33)
MCHC RBC AUTO-ENTMCNC: 33 G/DL (ref 33.7–35.3)
MCV RBC AUTO: 97.9 FL (ref 81.4–97.8)
MONOCYTES # BLD AUTO: 0.63 K/UL (ref 0–0.85)
MONOCYTES NFR BLD AUTO: 7.9 % (ref 0–13.4)
NEUTROPHILS # BLD AUTO: 4.5 K/UL (ref 1.82–7.42)
NEUTROPHILS NFR BLD: 56.7 % (ref 44–72)
NRBC # BLD AUTO: 0 K/UL
NRBC BLD-RTO: 0 /100 WBC
PLATELET # BLD AUTO: 200 K/UL (ref 164–446)
PMV BLD AUTO: 11.4 FL (ref 9–12.9)
RBC # BLD AUTO: 4.71 M/UL (ref 4.7–6.1)
TSH SERPL DL<=0.005 MIU/L-ACNC: 1.1 UIU/ML (ref 0.38–5.33)
VIT B12 SERPL-MCNC: 613 PG/ML (ref 211–911)
WBC # BLD AUTO: 8 K/UL (ref 4.8–10.8)

## 2018-10-08 PROCEDURE — 85025 COMPLETE CBC W/AUTO DIFF WBC: CPT

## 2018-10-08 PROCEDURE — 84443 ASSAY THYROID STIM HORMONE: CPT

## 2018-10-08 PROCEDURE — 36415 COLL VENOUS BLD VENIPUNCTURE: CPT

## 2018-10-08 PROCEDURE — 82607 VITAMIN B-12: CPT

## 2018-10-08 PROCEDURE — 82728 ASSAY OF FERRITIN: CPT

## 2018-10-08 PROCEDURE — 82746 ASSAY OF FOLIC ACID SERUM: CPT

## 2018-10-09 ENCOUNTER — OFFICE VISIT (OUTPATIENT)
Dept: MEDICAL GROUP | Facility: MEDICAL CENTER | Age: 38
End: 2018-10-09
Attending: NURSE PRACTITIONER
Payer: COMMERCIAL

## 2018-10-09 VITALS
RESPIRATION RATE: 22 BRPM | HEIGHT: 74 IN | SYSTOLIC BLOOD PRESSURE: 115 MMHG | TEMPERATURE: 98.2 F | OXYGEN SATURATION: 97 % | HEART RATE: 92 BPM | DIASTOLIC BLOOD PRESSURE: 60 MMHG | BODY MASS INDEX: 40.43 KG/M2 | WEIGHT: 315 LBS

## 2018-10-09 DIAGNOSIS — R09.81 NASAL CONGESTION: ICD-10-CM

## 2018-10-09 DIAGNOSIS — R52 GENERALIZED BODY ACHES: ICD-10-CM

## 2018-10-09 LAB
FLUAV+FLUBV AG SPEC QL IA: NEGATIVE
INT CON NEG: NEGATIVE
INT CON POS: POSITIVE

## 2018-10-09 PROCEDURE — 99212 OFFICE O/P EST SF 10 MIN: CPT | Performed by: NURSE PRACTITIONER

## 2018-10-09 PROCEDURE — 87804 INFLUENZA ASSAY W/OPTIC: CPT | Performed by: NURSE PRACTITIONER

## 2018-10-09 PROCEDURE — 99213 OFFICE O/P EST LOW 20 MIN: CPT | Performed by: NURSE PRACTITIONER

## 2018-10-09 RX ORDER — PIOGLITAZONEHYDROCHLORIDE 45 MG/1
TABLET ORAL
Qty: 30 TAB | Refills: 7 | Status: SHIPPED | OUTPATIENT
Start: 2018-10-09 | End: 2019-08-05 | Stop reason: SDUPTHER

## 2018-10-09 RX ORDER — FLUTICASONE PROPIONATE 50 MCG
1 SPRAY, SUSPENSION (ML) NASAL 2 TIMES DAILY
Qty: 16 G | Refills: 0 | Status: SHIPPED | OUTPATIENT
Start: 2018-10-09 | End: 2018-11-15 | Stop reason: SDUPTHER

## 2018-10-09 NOTE — TELEPHONE ENCOUNTER
----- Message from Nicho Tovar M.D. sent at 10/8/2018  6:58 PM PDT -----  Normal levels of vitamin B-12, folate, ferritin (iron), previous anemia is now normalized with excellent hemoglobin of 15.2.

## 2018-10-09 NOTE — PROGRESS NOTES
"Chief Complaint:   Chief Complaint   Patient presents with   • Generalized Body Aches     x4 days    • Nasal Congestion     x4 days        HPI:  Luis Alfredo is here today for  Same day appt for Weak feeling, sinus pressure, flu-like symptoms.    His PCP, Dr. Tovar is not available to see Luis Alfredo today.      [place]  Report:   9/14/18 Phentermine 37.5 # 30 by Dr Tovar  Similar entries in report    His PMH includes:  Bilat shoulder pain  Rotator Cuff tendonitis  Alcohol Abuse  Atypical CP  Cellulitis of Axilla  DM-2  GERD  HTN  Morbid Obesity  Tobacco use-smoking  Hernia Repair  Strabismus repair.    Review of Records:  9/13/18 Clinic Visit w Dr Tovar for f/u on mild anemia, DM-2, Morbid Obesity.  Labs ordered, RX Phentermine Refill.( Hgb 13.5, A1C= 6.4, Ferriton normal. TSH= 1.10, B12 normal))    Generalized body aches/nasal congestion  4 days of body aches  4 days of nasal congestion.   No fever. Denies chills  This w/e \"sneezing all day\", \"weak\", nasal congestion.  No cough, mild irritation to throat.   Mild ear pressure.   No hx of environmental allergies in past.   \"eating and sleeping a lot\"  Will check for Flu, no sore throat.  Clogged up nose,     Denies SOB or chest pain.  Asking for work note as missing today.  Discussed rest, staying hydrated, Flonase Nasal spray.  Tylenol or Acetaminophen otc for body aches.        Patient Active Problem List    Diagnosis Date Noted   • Generalized body aches 10/09/2018   • Normocytic anemia 09/13/2018   • Cellulitis of left axilla 07/06/2018   • Rotator cuff tendonitis, right 07/06/2018   • Acute pain of both shoulders 09/07/2017   • Atypical chest pain 06/30/2017   • Alcohol abuse, episodic 01/19/2017   • Acute right flank pain 01/19/2017   • Left sided sciatica 01/03/2017   • Cellulitis of extremity 01/03/2017   • Otalgia of both ears 08/30/2016   • Sleep apnea in adult 06/06/2016   • Anxiety 01/26/2016   • Morbid obesity (HCC) 09/22/2015   • Tobacco abuse counseling " 08/27/2015   • GERD (gastroesophageal reflux disease) 04/06/2015   • Hypertriglyceridemia 01/05/2015   • Diabetes mellitus type 2 in obese (HCC) 12/22/2014   • HTN (hypertension) 12/10/2014   • Chest pain 12/10/2014       Allergies:Patient has no known allergies.    Medicines as of today:  Current Outpatient Prescriptions   Medication Sig Dispense Refill   • fluticasone (FLONASE) 50 MCG/ACT nasal spray Spray 1 Spray in nose 2 times a day. 16 g 0   • pioglitazone (ACTOS) 45 MG Tab TAKE 1 TABLET BY MOUTH EVERY DAY 30 Tab 7   • phentermine 37.5 MG capsule Take 1 Cap by mouth every morning for 91 days. 30 Cap 2   • sertraline (ZOLOFT) 100 MG Tab Take 100 mg by mouth every evening.     • gabapentin (NEURONTIN) 300 MG Cap Take 300 mg by mouth 2 Times a Day.     • naproxen (NAPROSYN) 500 MG Tab Take 500 mg by mouth 2 times a day as needed.     • sulfamethoxazole-trimethoprim (BACTRIM DS) 800-160 MG tablet Take 1 Tab by mouth 2 times a day. Pt stated on 7/6/2018 for 7 day course.     • simvastatin (ZOCOR) 10 MG Tab Take 1 Tab by mouth every evening. 30 Tab 11   • metoprolol SR (TOPROL XL) 50 MG TABLET SR 24 HR Take 1 Tab by mouth every day. TAKE 1 TAB BY MOUTH EVERY DAY. 30 Tab 9   • ranitidine (ZANTAC) 300 MG tablet Take 1 Tab by mouth every day. TAKE 1 TAB BY MOUTH EVERY DAY. 30 Tab 6   • metFORMIN (GLUCOPHAGE) 500 MG Tab Take 2 Tabs by mouth 2 times a day, with meals. 120 Tab 11   • lisinopril (PRINIVIL) 10 MG Tab TAKE 1 TABLET BY MOUTH EVERY DAY 30 Tab 6   • Omega-3 Fatty Acids (FISH OIL) 1000 MG Cap capsule Take 1,000 mg by mouth every day.     • B Complex-C (SUPER B COMPLEX PO) Take 1 Tab by mouth every day.     • multivitamin (THERAGRAN) Tab Take 1 Tab by mouth every day.     • aspirin EC (ECOTRIN) 81 MG TBEC Take 81 mg by mouth every day.       No current facility-administered medications for this visit.        Social History   Substance Use Topics   • Smoking status: Current Every Day Smoker     Packs/day: 1.50      "Years: 21.00     Types: Cigarettes   • Smokeless tobacco: Never Used   • Alcohol use No       Past Medical History:   Diagnosis Date   • Chest pain     x 5 years.   • Hypertension    • Type II or unspecified type diabetes mellitus without mention of complication, not stated as uncontrolled        Family History   Problem Relation Age of Onset   • Diabetes Mother    • Stroke Father    • Alcohol/Drug Maternal Grandfather    • Diabetes Paternal Grandfather    • Cancer Paternal Grandfather         lung   • Heart Disease Neg Hx        ROS:  Review of Systems   See HPI Above    Exam:  Blood pressure 115/60, pulse 92, temperature 36.8 °C (98.2 °F), temperature source Temporal, resp. rate (!) 22, height 1.88 m (6' 2.02\"), weight (!) 168.7 kg (372 lb), SpO2 97 %. Body mass index is 47.74 kg/m².    General:  Well nourished, obese, well developed male in NAD, tired affect.  HENT:Head is grossly normal. PERRL. Ear canals clear, left TM slightly flattened light reflection, gray.  Right TM wnl. Posterior Pharynx slight red, Tonsils 2+ no exudate. Swallows easily.  Neck: Supple. Trachea is midline. No swollen or tender lymph nodes to anterior neck  Pulmonary: Clear to ausculation .  Normal effort. No rales, ronchi, or wheezing.   Cardiovascular: Regular rate and rhythm.  Abdomen-Abdomen is soft, No tenderness.  Upper extremities- Strong = . Good ROM  Lower extremities- neg for edema, redness, tenderness.  Neuro- A & O x 4. Speech clear and appropriate.    Current medications, allergies, and problem list reviewed with patient and updated in [facility] today.    Assessment/Plan:  1. Generalized body aches  POCT Influenza A/B= Negative   2. Nasal congestion  fluticasone (FLONASE) 50 MCG/ACT nasal spray  REst, otc cold medications. Stay well hydrated.  Letter/Work note off today through Friday and return Saturday 10/13/18    POCT Influenza A/B   Instructions to patient: if fever,chills, SOB,sore throat call/return or be seen at  or " ER.    Return in about 2 days (around 10/11/2018) for as previously planned to see Dr Tovar.

## 2018-10-09 NOTE — ASSESSMENT & PLAN NOTE
"4 days of body aches  4 days of nasal congestion.   No fever. Denies chills  This w/e \"sneezing all day\", \"weak\", nasal congestion.  No cough, mild irritation to throat.   Mild ear pressure.   No hx of environmental allergies in past.   \"eating and sleeping a lot\"  Will check for Flu, no sore throat.  Clogged up nose,     Denies SOB or chest pain.  Asking for work note as missing today.  Discussed rest, staying hydrated, Flonase Nasal spray.  Tylenol or Acetaminophen otc for body aches.    "

## 2018-10-09 NOTE — LETTER
October 9, 2018       Patient: Luis Alfredo Javed   YOB: 1980   Date of Visit: 10/9/2018         To Whom It May Concern:     Luis Alfredo Javed was seen in our clinic today for illness.    Please excuse him from work 10/8 through 10/12/18 and may return to work on 10/13/18 without restrictions.      If you have any questions or concerns, please don't hesitate to call [phone]          Sincerely,          SUSAN AriasPGUZMAN.  Electronically Signed

## 2018-10-11 ENCOUNTER — OFFICE VISIT (OUTPATIENT)
Dept: MEDICAL GROUP | Facility: MEDICAL CENTER | Age: 38
End: 2018-10-11
Attending: FAMILY MEDICINE
Payer: COMMERCIAL

## 2018-10-11 VITALS
DIASTOLIC BLOOD PRESSURE: 82 MMHG | WEIGHT: 315 LBS | HEART RATE: 92 BPM | HEIGHT: 74 IN | TEMPERATURE: 98.1 F | OXYGEN SATURATION: 96 % | SYSTOLIC BLOOD PRESSURE: 126 MMHG | RESPIRATION RATE: 16 BRPM | BODY MASS INDEX: 40.43 KG/M2

## 2018-10-11 DIAGNOSIS — D64.9 NORMOCYTIC ANEMIA: ICD-10-CM

## 2018-10-11 DIAGNOSIS — E66.01 MORBID OBESITY (HCC): ICD-10-CM

## 2018-10-11 PROCEDURE — 99213 OFFICE O/P EST LOW 20 MIN: CPT | Performed by: FAMILY MEDICINE

## 2018-10-11 ASSESSMENT — PAIN SCALES - GENERAL: PAINLEVEL: NO PAIN

## 2018-10-11 NOTE — PROGRESS NOTES
"Subjective:      Luis Alfredo Javed is a 38 y.o. male who presents with No chief complaint on file.            HPI 1.Morbid obesity- pt notes increased eating (when at home, bored). No unexplained hair loss, cold intolerance. Patient is contemplating starting up a gym membership.  2. Normocytic normochromic anemia-labs have been repeated and anemia is now resolved. Normal levels of ferritin, B-12, folate    ROSneg for chest pain, dysuria, bloody stools       Objective:     /82 (BP Location: Left arm, Patient Position: Sitting, BP Cuff Size: Large adult)   Pulse 92   Temp 36.7 °C (98.1 °F) (Temporal)   Resp 16   Ht 1.88 m (6' 2.02\")   Wt (!) 165.1 kg (364 lb)   SpO2 96%   BMI 46.71 kg/m²      Physical Exam  Gen.- alert, cooperative, in no acute distress  Neck- midline trachea, thyroid not enlarged or tender,supple, no cervical adenopathy  Chest-clear to auscultation and percussion with normal breath sounds. No retractions. Chest wall nontender  Cardiac- regular rhythm and rate. No murmur, thrill, or heave  Hair-some hair thinning is noted in the upper central forehead area with no patchy hair loss, reddened scalp          Assessment/Plan:     1. Morbid obesity (HCC)      2. Normocytic anemia      Plan: 1. Patient has access to phentermine  2. Patient is encouraged to come up with scheduled activities on his days off to avoid hanging out at home with temptation to overeat  3. Revisit with me one month-follow-up weight  "

## 2018-11-01 ENCOUNTER — PATIENT MESSAGE (OUTPATIENT)
Dept: HEALTH INFORMATION MANAGEMENT | Facility: OTHER | Age: 38
End: 2018-11-01

## 2018-11-02 ENCOUNTER — NON-PROVIDER VISIT (OUTPATIENT)
Dept: URGENT CARE | Facility: CLINIC | Age: 38
End: 2018-11-02

## 2018-11-02 DIAGNOSIS — Z02.1 PRE-EMPLOYMENT DRUG SCREENING: ICD-10-CM

## 2018-11-02 LAB
AMP AMPHETAMINE: NORMAL
BAR BARBITURATES: NORMAL
BREATH ALCOHOL COMMENT: 0
BZO BENZODIAZEPINES: NORMAL
COC COCAINE: NORMAL
INT CON NEG: NORMAL
INT CON POS: NORMAL
MDMA ECSTASY: NORMAL
MET METHAMPHETAMINES: NORMAL
MTD METHADONE: NORMAL
OPI OPIATES: NORMAL
OXY OXYCODONE: NORMAL
PCP PHENCYCLIDINE: NORMAL
POC BREATHALIZER: 0 PERCENT (ref 0–0.01)
POC URINE DRUG SCREEN OCDRS: NORMAL
THC: NORMAL

## 2018-11-02 PROCEDURE — 82075 ASSAY OF BREATH ETHANOL: CPT | Performed by: PHYSICIAN ASSISTANT

## 2018-11-02 PROCEDURE — 80305 DRUG TEST PRSMV DIR OPT OBS: CPT | Performed by: PHYSICIAN ASSISTANT

## 2018-11-08 ENCOUNTER — OFFICE VISIT (OUTPATIENT)
Dept: MEDICAL GROUP | Facility: MEDICAL CENTER | Age: 38
End: 2018-11-08
Attending: FAMILY MEDICINE
Payer: COMMERCIAL

## 2018-11-08 VITALS
SYSTOLIC BLOOD PRESSURE: 110 MMHG | RESPIRATION RATE: 16 BRPM | TEMPERATURE: 98.6 F | HEIGHT: 74 IN | BODY MASS INDEX: 40.43 KG/M2 | DIASTOLIC BLOOD PRESSURE: 70 MMHG | OXYGEN SATURATION: 96 % | HEART RATE: 110 BPM | WEIGHT: 315 LBS

## 2018-11-08 DIAGNOSIS — E66.01 MORBID OBESITY (HCC): ICD-10-CM

## 2018-11-08 DIAGNOSIS — Z23 FLU VACCINE NEED: ICD-10-CM

## 2018-11-08 DIAGNOSIS — E66.9 DIABETES MELLITUS TYPE 2 IN OBESE (HCC): ICD-10-CM

## 2018-11-08 DIAGNOSIS — E11.69 DIABETES MELLITUS TYPE 2 IN OBESE (HCC): ICD-10-CM

## 2018-11-08 PROCEDURE — 99212 OFFICE O/P EST SF 10 MIN: CPT | Mod: 25 | Performed by: FAMILY MEDICINE

## 2018-11-08 PROCEDURE — 99213 OFFICE O/P EST LOW 20 MIN: CPT | Performed by: FAMILY MEDICINE

## 2018-11-08 PROCEDURE — 90686 IIV4 VACC NO PRSV 0.5 ML IM: CPT

## 2018-11-08 ASSESSMENT — PAIN SCALES - GENERAL: PAINLEVEL: NO PAIN

## 2018-11-08 NOTE — PROGRESS NOTES
"Subjective:      Luis Alfredo Javed is a 38 y.o. male who presents with Follow-Up            HPI 1.  Morbid obesity-patient reports that he has been eating increased amounts of snack food recently due to increased stress.  He had an accident at work and has been on a one-week suspension finds out whether he can return tomorrow.  Otherwise he will be looking at taking a alternative job with lower pay.    ROS negative for recent hair loss, cold intolerance, rash       Objective:     /70 (BP Location: Left arm, Patient Position: Sitting, BP Cuff Size: Large adult)   Pulse (!) 110   Temp 37 °C (98.6 °F) (Temporal)   Resp 16   Ht 1.88 m (6' 2.02\")   Wt (!) 166 kg (366 lb)   SpO2 96%   BMI 46.97 kg/m²      Physical Exam  Gen.- alert, cooperative, in no acute distress  Neck- midline trachea, thyroid not enlarged or tender,supple, no cervical adenopathy  Chest-clear to auscultation and percussion with normal breath sounds. No retractions. Chest wall nontender  Cardiac- regular rhythm and rate. No murmur, thrill, or heave  Psych-normal affect with good eye contact. Normal grooming. Oriented x4.            Assessment/Plan:     1. Morbid obesity (HCC)    Plan: 1.  Patient is encouraged to return to dietary guidelines that he knows well.  2.  Continue current medications  3.  Revisit in 1 month with A1c prior to that visit  4.  We will not renew phentermine due to lack of effect  "

## 2018-11-12 ENCOUNTER — NON-PROVIDER VISIT (OUTPATIENT)
Dept: URGENT CARE | Facility: PHYSICIAN GROUP | Age: 38
End: 2018-11-12

## 2018-11-12 DIAGNOSIS — Z02.1 PRE-EMPLOYMENT DRUG SCREENING: ICD-10-CM

## 2018-11-12 LAB
AMP AMPHETAMINE: NORMAL
COC COCAINE: NORMAL
INT CON NEG: NEGATIVE
INT CON POS: POSITIVE
MET METHAMPHETAMINES: NORMAL
OPI OPIATES: NORMAL
PCP PHENCYCLIDINE: NORMAL
POC DRUG COMMENT 753798-OCCUPATIONAL HEALTH: NEGATIVE
THC: NORMAL

## 2018-11-12 PROCEDURE — 80305 DRUG TEST PRSMV DIR OPT OBS: CPT | Performed by: PHYSICIAN ASSISTANT

## 2019-01-08 ENCOUNTER — TELEPHONE (OUTPATIENT)
Dept: MEDICAL GROUP | Facility: MEDICAL CENTER | Age: 39
End: 2019-01-08

## 2019-01-08 NOTE — TELEPHONE ENCOUNTER
Left message with patient about no show to appointment today 1/8/19.  Explained that this was his first no show and the no show policy.

## 2019-01-09 ENCOUNTER — HOSPITAL ENCOUNTER (EMERGENCY)
Facility: MEDICAL CENTER | Age: 39
End: 2019-01-09
Attending: EMERGENCY MEDICINE
Payer: COMMERCIAL

## 2019-01-09 VITALS
HEIGHT: 74 IN | OXYGEN SATURATION: 90 % | HEART RATE: 116 BPM | SYSTOLIC BLOOD PRESSURE: 164 MMHG | DIASTOLIC BLOOD PRESSURE: 108 MMHG | TEMPERATURE: 97.2 F | BODY MASS INDEX: 40.43 KG/M2 | RESPIRATION RATE: 20 BRPM | WEIGHT: 315 LBS

## 2019-01-09 DIAGNOSIS — J02.0 STREP PHARYNGITIS: ICD-10-CM

## 2019-01-09 LAB
S PYO AG THROAT QL: ABNORMAL
SIGNIFICANT IND 70042: ABNORMAL
SITE SITE: ABNORMAL
SOURCE SOURCE: ABNORMAL

## 2019-01-09 PROCEDURE — 700102 HCHG RX REV CODE 250 W/ 637 OVERRIDE(OP): Performed by: EMERGENCY MEDICINE

## 2019-01-09 PROCEDURE — 99284 EMERGENCY DEPT VISIT MOD MDM: CPT

## 2019-01-09 PROCEDURE — 87880 STREP A ASSAY W/OPTIC: CPT

## 2019-01-09 PROCEDURE — A9270 NON-COVERED ITEM OR SERVICE: HCPCS | Performed by: EMERGENCY MEDICINE

## 2019-01-09 RX ORDER — AMOXICILLIN 250 MG/1
500 CAPSULE ORAL ONCE
Status: COMPLETED | OUTPATIENT
Start: 2019-01-09 | End: 2019-01-09

## 2019-01-09 RX ORDER — IBUPROFEN 600 MG/1
600 TABLET ORAL ONCE
Status: COMPLETED | OUTPATIENT
Start: 2019-01-09 | End: 2019-01-09

## 2019-01-09 RX ORDER — AMOXICILLIN 500 MG/1
500 CAPSULE ORAL 3 TIMES DAILY
Qty: 21 CAP | Refills: 0 | Status: SHIPPED | OUTPATIENT
Start: 2019-01-09 | End: 2019-01-16

## 2019-01-09 RX ORDER — IBUPROFEN 200 MG
400 TABLET ORAL 3 TIMES DAILY PRN
Status: SHIPPED | COMMUNITY
End: 2019-07-30

## 2019-01-09 RX ORDER — ACETAMINOPHEN 325 MG/1
650 TABLET ORAL ONCE
Status: COMPLETED | OUTPATIENT
Start: 2019-01-09 | End: 2019-01-09

## 2019-01-09 RX ADMIN — ACETAMINOPHEN 650 MG: 325 TABLET, FILM COATED ORAL at 09:20

## 2019-01-09 RX ADMIN — AMOXICILLIN 500 MG: 250 CAPSULE ORAL at 09:34

## 2019-01-09 RX ADMIN — IBUPROFEN 600 MG: 600 TABLET ORAL at 09:21

## 2019-01-09 ASSESSMENT — PAIN SCALES - GENERAL: PAINLEVEL_OUTOF10: 7

## 2019-01-09 ASSESSMENT — PAIN DESCRIPTION - DESCRIPTORS: DESCRIPTORS: ACHING

## 2019-01-09 NOTE — ED NOTES
Med rec complete per pt and pt spouse with list at bedside  Reviewed list with pt and returned to spouse at bedside  Ok per Pt to discuss medications with visitor/s present  Allergies have been verified  No oral ABX within the last 30 days  Pt Home Pharmacy:[facility]

## 2019-01-09 NOTE — ED NOTES
Luiz from Lab called with critical result of Strep A at 0939. Critical lab result read back to Luiz.   Dr. Coelho notified of critical lab result at 0939.  Critical lab result read back by Dr. Coelho.

## 2019-01-09 NOTE — ED PROVIDER NOTES
"ED Provider Note    CHIEF COMPLAINT  Chief Complaint   Patient presents with   • Sore Throat     for the past week, \"hurst to swallow\"    • Ear Pain     L ear, no drainage noted.    • Eye Pain     L eye since this am.    • Cough       HPI  Luis Alfredobib Javed is a 38 y.o. male who presents stating that he has had about 1 week of sore throat and worsening ear pain but only minimal cough.  Says it hurts to swallow.  Denies any sick contacts but has had subjective fevers.  Has had some painful swallow and headaches.  Denies any other complaints    REVIEW OF SYSTEMS  See HPI for further details. All other systems are negative.     PAST MEDICAL HISTORY  Past Medical History:   Diagnosis Date   • Chest pain     x 5 years.   • Hypertension    • Type II or unspecified type diabetes mellitus without mention of complication, not stated as uncontrolled        FAMILY HISTORY  Family History   Problem Relation Age of Onset   • Diabetes Mother    • Stroke Father    • Alcohol/Drug Maternal Grandfather    • Diabetes Paternal Grandfather    • Cancer Paternal Grandfather         lung   • Heart Disease Neg Hx        SOCIAL HISTORY   reports that he has been smoking Cigarettes.  He has a 31.50 pack-year smoking history. He has never used smokeless tobacco. He reports that he does not drink alcohol or use drugs.    SURGICAL HISTORY  Past Surgical History:   Procedure Laterality Date   • HERNIA REPAIR  age 4   • STRABISMUS REPAIR  age 5       CURRENT MEDICATIONS  Home Medications     Reviewed by Viki Pat (Pharmacy Tech) on 01/09/19 at 0921  Med List Status: Complete   Medication Last Dose Status   aspirin EC (ECOTRIN) 81 MG TBEC 1/7/2019 Active   B Complex-C (SUPER B COMPLEX PO) 1/7/2019 Active   gabapentin (NEURONTIN) 300 MG Cap 1/7/2019 Active   ibuprofen (MOTRIN) 200 MG Tab 1/8/2019 Active   lisinopril (PRINIVIL) 10 MG Tab 1/7/2019 Active   metFORMIN (GLUCOPHAGE) 500 MG Tab 1/7/2019 Active   metoprolol SR (TOPROL XL) 50 MG " "TABLET SR 24 HR 1/7/2019 Active   multivitamin (THERAGRAN) Tab 1/7/2019 Active   pioglitazone (ACTOS) 45 MG Tab 1/7/2019 Active   ranitidine (ZANTAC) 300 MG tablet 1/7/2019 Active   sertraline (ZOLOFT) 100 MG Tab 1/7/2019 Active   simvastatin (ZOCOR) 10 MG Tab 1/7/2019 Active                ALLERGIES  No Known Allergies    PHYSICAL EXAM  VITAL SIGNS: BP (!) 164/108   Pulse (!) 108   Temp 36.2 °C (97.2 °F) (Temporal)   Resp 20   Ht 1.88 m (6' 2\")   Wt (!) 167.7 kg (369 lb 11.4 oz)   SpO2 92%   BMI 47.47 kg/m²    Constitutional: Well developed, Well nourished, No acute distress, Non-toxic appearance.   HENT: Normocephalic, Atraumatic, Bilateral external ears normal, Oropharynx without exudate or uvular deviation but there is significant erythema   eyes: Pupils are equal round and reactive, EOMI, Conjunctiva normal, No discharge.   Neck: Normal range of motion, No tenderness, Supple, No stridor. No meningismus.  Lymphatic: Bilateral tender submandibular lymphadenopathy noted.   Cardiovascular: Tachycardic rate and rhythm without murmur rub or gallop.  Thorax & Lungs: Clear breath sounds bilaterally without wheezes, rhonchi or rales. There is no chest wall tenderness.   Abdomen: Soft non-tender non-distended. There is no rebound or guarding. No organomegaly is appreciated. Bowel sounds are normal.  Skin: Normal without rash.   Back: No CVA or spinal tenderness.   Extremities: Intact distal pulses, No edema, No tenderness, No cyanosis, No clubbing. Capillary refill is less than 2 seconds.  Musculoskeletal: Good range of motion in all major joints. No tenderness to palpation or major deformities noted.   Neurologic: Alert & oriented x 3, Normal motor function, Normal sensory function, No focal deficits noted. Reflexes are normal.  Psychiatric: Affect normal, Judgment normal, Mood normal. There is no suicidal ideation or patient reported hallucinations.         COURSE & MEDICAL DECISION MAKING  Pertinent Labs & " Imaging studies reviewed. (See chart for details)  I gave the patient ibuprofen and sent off a rapid strep which is positive for group A beta-hemolytic strep.  Patient is given initial amoxicillin dose in the emergency department and counseled on copious fluids for hydration and to take his full course of medication.  He will use ibuprofen as needed every 6 hours for pain control discharged in stable condition and will return if any significant change in symptoms    Patient's blood pressure was noted be elevated in the emergency department is referred to primary care for further management under the care of Dr. Schulz    FINAL IMPRESSION  1. Strep pharyngitis             Electronically signed by: Ray Coelho, 1/9/2019 10:06 AM

## 2019-01-09 NOTE — ED NOTES
ERP aware of BP, pt has not taken BP medications this morning.  Reviewed discharge instructions and printed prescription x 1 w/ pt and family member, verbalized understanding to information provided including follow up care, denied questions/concerns, declined need for work note.  Pt ambulated from ED w/ family member.

## 2019-01-09 NOTE — ED TRIAGE NOTES
".  .  Chief Complaint   Patient presents with   • Sore Throat     for the past week, \"hurst to swallow\"    • Ear Pain     L ear, no drainage noted.    • Eye Pain     L eye since this am.    • Cough         .Blood pressure (!) 164/108, pulse (!) 118, temperature 36.2 °C (97.2 °F), temperature source Temporal, resp. rate 20, height 1.88 m (6' 2\"), weight (!) 167.7 kg (369 lb 11.4 oz), SpO2 93 %.      "

## 2019-01-10 ENCOUNTER — PATIENT OUTREACH (OUTPATIENT)
Dept: HEALTH INFORMATION MANAGEMENT | Facility: OTHER | Age: 39
End: 2019-01-10

## 2019-01-11 ENCOUNTER — OFFICE VISIT (OUTPATIENT)
Dept: MEDICAL GROUP | Facility: MEDICAL CENTER | Age: 39
End: 2019-01-11
Attending: FAMILY MEDICINE
Payer: COMMERCIAL

## 2019-01-11 VITALS
HEIGHT: 74 IN | HEART RATE: 104 BPM | OXYGEN SATURATION: 96 % | DIASTOLIC BLOOD PRESSURE: 90 MMHG | TEMPERATURE: 97.6 F | WEIGHT: 315 LBS | RESPIRATION RATE: 20 BRPM | SYSTOLIC BLOOD PRESSURE: 130 MMHG | BODY MASS INDEX: 40.43 KG/M2

## 2019-01-11 DIAGNOSIS — E66.9 DIABETES MELLITUS TYPE 2 IN OBESE (HCC): ICD-10-CM

## 2019-01-11 DIAGNOSIS — E11.69 DIABETES MELLITUS TYPE 2 IN OBESE (HCC): ICD-10-CM

## 2019-01-11 DIAGNOSIS — I10 ESSENTIAL HYPERTENSION: ICD-10-CM

## 2019-01-11 PROCEDURE — 99213 OFFICE O/P EST LOW 20 MIN: CPT | Performed by: FAMILY MEDICINE

## 2019-01-11 RX ORDER — LISINOPRIL 10 MG/1
10 TABLET ORAL
Qty: 30 TAB | Refills: 11 | Status: SHIPPED | OUTPATIENT
Start: 2019-01-11 | End: 2022-03-29 | Stop reason: SDUPTHER

## 2019-01-11 ASSESSMENT — PAIN SCALES - GENERAL: PAINLEVEL: NO PAIN

## 2019-02-07 ENCOUNTER — HOSPITAL ENCOUNTER (OUTPATIENT)
Dept: LAB | Facility: MEDICAL CENTER | Age: 39
End: 2019-02-07
Attending: FAMILY MEDICINE
Payer: COMMERCIAL

## 2019-02-07 DIAGNOSIS — E11.69 DIABETES MELLITUS TYPE 2 IN OBESE (HCC): ICD-10-CM

## 2019-02-07 DIAGNOSIS — E66.9 DIABETES MELLITUS TYPE 2 IN OBESE (HCC): ICD-10-CM

## 2019-02-07 LAB
EST. AVERAGE GLUCOSE BLD GHB EST-MCNC: 212 MG/DL
HBA1C MFR BLD: 9 % (ref 0–5.6)

## 2019-02-07 PROCEDURE — 83036 HEMOGLOBIN GLYCOSYLATED A1C: CPT

## 2019-02-07 PROCEDURE — 36415 COLL VENOUS BLD VENIPUNCTURE: CPT

## 2019-02-08 ENCOUNTER — TELEPHONE (OUTPATIENT)
Dept: MEDICAL GROUP | Facility: MEDICAL CENTER | Age: 39
End: 2019-02-08

## 2019-02-08 NOTE — TELEPHONE ENCOUNTER
----- Message from Nicho Tovar M.D. sent at 2/7/2019  6:01 PM PST -----  Diabetes control has significantly lessened with A1c rising up to 9.0 from previous result 6.4 obtained 5 months ago.  Will review at next visit.

## 2019-02-08 NOTE — TELEPHONE ENCOUNTER
Phone Number Called: [phone] (home)       Message: Called and left message to call back for results.     Left Message for patient to call back: yes

## 2019-03-18 ENCOUNTER — TELEPHONE (OUTPATIENT)
Dept: MEDICAL GROUP | Facility: MEDICAL CENTER | Age: 39
End: 2019-03-18

## 2019-03-18 NOTE — TELEPHONE ENCOUNTER
Left message with patient about no show to appointment Friday 3/15/19.  Explained that this was his 2nd no show and the no show policy.

## 2019-04-16 ENCOUNTER — TELEPHONE (OUTPATIENT)
Dept: MEDICAL GROUP | Facility: MEDICAL CENTER | Age: 39
End: 2019-04-16

## 2019-04-16 NOTE — TELEPHONE ENCOUNTER
Phone Number Called: [phone] (home)       Message: Pt. Advised of 3rd no show, advised of policy and letter will be sent. Pt stated verbal understanding.      Left Message for patient to call back: No

## 2019-05-24 DIAGNOSIS — M75.81 ROTATOR CUFF TENDONITIS, RIGHT: ICD-10-CM

## 2019-05-24 RX ORDER — NAPROXEN 500 MG/1
500 TABLET ORAL 2 TIMES DAILY WITH MEALS
Qty: 60 TAB | Refills: 0 | Status: SHIPPED | OUTPATIENT
Start: 2019-05-24 | End: 2019-07-30

## 2019-06-27 RX ORDER — METOPROLOL SUCCINATE 50 MG/1
50 TABLET, EXTENDED RELEASE ORAL
Qty: 30 TAB | Refills: 1 | Status: SHIPPED | OUTPATIENT
Start: 2019-06-27 | End: 2022-03-29 | Stop reason: SDUPTHER

## 2019-07-05 DIAGNOSIS — M75.81 ROTATOR CUFF TENDONITIS, RIGHT: ICD-10-CM

## 2019-07-08 RX ORDER — NAPROXEN 500 MG/1
TABLET ORAL
Refills: 0 | OUTPATIENT
Start: 2019-07-08

## 2019-07-28 ENCOUNTER — APPOINTMENT (OUTPATIENT)
Dept: RADIOLOGY | Facility: IMAGING CENTER | Age: 39
End: 2019-07-28
Attending: NURSE PRACTITIONER
Payer: COMMERCIAL

## 2019-07-28 ENCOUNTER — OFFICE VISIT (OUTPATIENT)
Dept: URGENT CARE | Facility: PHYSICIAN GROUP | Age: 39
End: 2019-07-28
Payer: COMMERCIAL

## 2019-07-28 VITALS
TEMPERATURE: 98.2 F | SYSTOLIC BLOOD PRESSURE: 138 MMHG | DIASTOLIC BLOOD PRESSURE: 100 MMHG | OXYGEN SATURATION: 96 % | WEIGHT: 315 LBS | HEIGHT: 73 IN | RESPIRATION RATE: 16 BRPM | HEART RATE: 95 BPM | BODY MASS INDEX: 41.75 KG/M2

## 2019-07-28 DIAGNOSIS — L97.509 TYPE 2 DIABETES MELLITUS WITH FOOT ULCER, WITHOUT LONG-TERM CURRENT USE OF INSULIN (HCC): ICD-10-CM

## 2019-07-28 DIAGNOSIS — E11.621 TYPE 2 DIABETES MELLITUS WITH FOOT ULCER, WITHOUT LONG-TERM CURRENT USE OF INSULIN (HCC): ICD-10-CM

## 2019-07-28 DIAGNOSIS — H60.501 ACUTE OTITIS EXTERNA OF RIGHT EAR, UNSPECIFIED TYPE: ICD-10-CM

## 2019-07-28 LAB — GLUCOSE BLD-MCNC: 263 MG/DL (ref 70–100)

## 2019-07-28 PROCEDURE — 82962 GLUCOSE BLOOD TEST: CPT | Performed by: NURSE PRACTITIONER

## 2019-07-28 PROCEDURE — 73660 X-RAY EXAM OF TOE(S): CPT | Mod: TC,RT | Performed by: NURSE PRACTITIONER

## 2019-07-28 PROCEDURE — 99204 OFFICE O/P NEW MOD 45 MIN: CPT | Mod: 25 | Performed by: NURSE PRACTITIONER

## 2019-07-28 RX ORDER — OFLOXACIN 3 MG/ML
10 SOLUTION AURICULAR (OTIC) DAILY
Qty: 10 ML | Refills: 0 | Status: SHIPPED | OUTPATIENT
Start: 2019-07-28 | End: 2019-07-30

## 2019-07-28 RX ORDER — AMOXICILLIN AND CLAVULANATE POTASSIUM 875; 125 MG/1; MG/1
1 TABLET, FILM COATED ORAL 2 TIMES DAILY
Qty: 20 TAB | Refills: 0 | Status: SHIPPED | OUTPATIENT
Start: 2019-07-28 | End: 2019-07-30

## 2019-07-28 RX ORDER — DOXYCYCLINE HYCLATE 100 MG
100 TABLET ORAL 2 TIMES DAILY
Qty: 20 TAB | Refills: 0 | Status: SHIPPED | OUTPATIENT
Start: 2019-07-28 | End: 2019-07-30

## 2019-07-28 NOTE — PROGRESS NOTES
Chief Complaint   Patient presents with   • Blister     blister on right foot middle toe        HISTORY OF PRESENT ILLNESS: Patient is a 39 y.o. male who presents to urgent care today with complaints of a foot infection as well as ear pain. First noticed a blister to his toe approximately 3 weeks ago.  Since then, he has noticed an additional blister along with associated swelling and discoloration to the toe.  He is type II diabetic, non-insulin-dependent, notes he takes his medications as directed but forgets to take often. He has not been checking his blood sugars regularly.  He does have a history of neuropathy and does not have feeling in his toes.  Denies any injury to the foot but believes that his initial blister came from his footwear.  He denies associated fever, chills, malaise.  Denies previous history of the same.  Denies much changes to his toe over the past two weeks.  In addition, he complains of right ear pain for the past 3 days.  The pain is primarily external, exacerbated with touch.  He denies any injury or recent swimming but does use Q-tips often.  Denies associated rhinitis, cough, sinus pressure, fever, or headache.    Patient Active Problem List    Diagnosis Date Noted   • Generalized body aches 10/09/2018   • Normocytic anemia 09/13/2018   • Cellulitis of left axilla 07/06/2018   • Rotator cuff tendonitis, right 07/06/2018   • Acute pain of both shoulders 09/07/2017   • Atypical chest pain 06/30/2017   • Alcohol abuse, episodic 01/19/2017   • Acute right flank pain 01/19/2017   • Left sided sciatica 01/03/2017   • Cellulitis of extremity 01/03/2017   • Otalgia of both ears 08/30/2016   • Sleep apnea in adult 06/06/2016   • Anxiety 01/26/2016   • Morbid obesity (HCC) 09/22/2015   • Tobacco abuse counseling 08/27/2015   • GERD (gastroesophageal reflux disease) 04/06/2015   • Hypertriglyceridemia 01/05/2015   • Diabetes mellitus type 2 in obese (HCC) 12/22/2014   • HTN (hypertension)  12/10/2014   • Chest pain 12/10/2014       Allergies:Patient has no known allergies.    Current Outpatient Prescriptions Ordered in [facility]   Medication Sig Dispense Refill   • amoxicillin-clavulanate (AUGMENTIN) 875-125 MG Tab Take 1 Tab by mouth 2 times a day for 10 days. 20 Tab 0   • doxycycline (VIBRAMYCIN) 100 MG Tab Take 1 Tab by mouth 2 times a day for 10 days. 20 Tab 0   • ofloxacin otic sol (FLOXIN OTIC) 0.3 % Solution Place 10 Drops in right ear every day for 7 days. 10 mL 0   • metFORMIN (GLUCOPHAGE) 500 MG Tab TAKE 2 TABS BY MOUTH 2 TIMES A DAY, WITH MEALS. 120 Tab 1   • metoprolol SR (TOPROL XL) 50 MG TABLET SR 24 HR TAKE 1 TAB BY MOUTH EVERY DAY. TAKE 1 TAB BY MOUTH EVERY DAY. 30 Tab 1   • naproxen (NAPROSYN) 500 MG Tab TAKE 1 TAB BY MOUTH 2 TIMES A DAY, WITH MEALS. 60 Tab 0   • Blood Glucose Test Strips Uses True metrix meter. Compatible test strips, use 2 X/day number 100 Lancets use 2 X/day number 100 1 Mutually Defined 11   • lisinopril (PRINIVIL) 10 MG Tab Take 1 Tab by mouth every day. 30 Tab 11   • ibuprofen (MOTRIN) 200 MG Tab Take 400 mg by mouth 3 times a day as needed for Mild Pain.     • pioglitazone (ACTOS) 45 MG Tab TAKE 1 TABLET BY MOUTH EVERY DAY 30 Tab 7   • sertraline (ZOLOFT) 100 MG Tab Take 100 mg by mouth every evening.     • gabapentin (NEURONTIN) 300 MG Cap Take 300 mg by mouth 2 Times a Day.     • simvastatin (ZOCOR) 10 MG Tab Take 1 Tab by mouth every evening. 30 Tab 11   • ranitidine (ZANTAC) 300 MG tablet Take 1 Tab by mouth every day. TAKE 1 TAB BY MOUTH EVERY DAY. 30 Tab 6   • B Complex-C (SUPER B COMPLEX PO) Take 1 Tab by mouth every day.     • multivitamin (THERAGRAN) Tab Take 1 Tab by mouth every day.     • aspirin EC (ECOTRIN) 81 MG TBEC Take 81 mg by mouth every day.       Current Facility-Administered Medications Ordered in Epic   Medication Dose Route Frequency Provider Last Rate Last Dose   • cefTRIAXone (ROCEPHIN) 1 g, lidocaine (XYLOCAINE) 1 % 3.6 mL for IM use  1 g  "Intramuscular Once ESTRADA Rosas           Past Medical History:   Diagnosis Date   • Chest pain     x 5 years.   • Hypertension    • Type II or unspecified type diabetes mellitus without mention of complication, not stated as uncontrolled        Social History   Substance Use Topics   • Smoking status: Current Every Day Smoker     Packs/day: 1.50     Years: 21.00     Types: Cigarettes   • Smokeless tobacco: Never Used   • Alcohol use No       Family Status   Relation Status   • Mo (Not Specified)   • Fa (Not Specified)   • MGFa (Not Specified)   • PGFa (Not Specified)   • Neg Hx (Not Specified)     Family History   Problem Relation Age of Onset   • Diabetes Mother    • Stroke Father    • Alcohol/Drug Maternal Grandfather    • Diabetes Paternal Grandfather    • Cancer Paternal Grandfather         lung   • Heart Disease Neg Hx        ROS:  Review of Systems   Constitutional: Negative for fever, chills, weight loss, malaise, and fatigue.   HENT: Positive for ear pain.  Negative for nosebleeds, congestion, sore throat and neck pain.    Eyes: Negative for vision changes.   Neuro: Negative for headache, sensory changes, weakness, seizure, LOC.   Cardiovascular: Negative for chest pain, palpitations, orthopnea and leg swelling.   Respiratory: Negative for cough, sputum production, shortness of breath and wheezing.   Gastrointestinal: Negative for abdominal pain, nausea, vomiting or diarrhea.   Genitourinary: Negative for dysuria, urgency and frequency.  Musculoskeletal: Negative for falls, neck pain, back pain, joint pain, myalgias.   Skin: Positive for wound.  Negative for rash, diaphoresis.     Exam:  /100   Pulse 95   Temp 36.8 °C (98.2 °F) (Temporal)   Resp 16   Ht 1.854 m (6' 1\")   Wt (!) 158.8 kg (350 lb)   SpO2 96%   General: well-nourished, well-developed male in NAD  Head: normocephalic, atraumatic  Eyes: PERRLA, no conjunctival injection, acuity grossly intact, lids normal.  Ears: normal shape " "and symmetry, no discharge. Left external canal is without any significant edema or erythema. Right external canal is edematous, erythematous, without drainage, tenderness to tragus. Tympanic membranes are without any inflammation, no effusion. Gross auditory acuity is intact.  Nose: symmetrical without tenderness, no discharge.  Mouth/Throat: reasonable hygiene, no erythema, exudates or tonsillar enlargement.  Neck: no masses, range of motion within normal limits, no tracheal deviation. No obvious thyroid enlargement.   Lymph: no cervical adenopathy. No supraclavicular adenopathy.   Neuro: alert and oriented. Cranial nerves 1-12 grossly intact. No sensory deficit.   Cardiovascular: regular rate and rhythm. No edema.  Pulmonary: no distress. Chest is symmetrical with respiration, no wheezes, crackles, or rhonchi.   Musculoskeletal: no clubbing, appropriate muscle tone, gait is stable. There is generalized swelling to left second toe, full ROM, no sensation which is chronic for him. No streaking.   Skin: warm, no clubbing, no cyanosis, no rashes. There is a 1cm circular superficial ulceration to pad of second left toe, no streaking or active drainage.   Psych: appropriate mood, affect, judgement.         Assessment/Plan:  1. Type 2 diabetes mellitus with foot ulcer, without long-term current use of insulin ([facility])  POCT Glucose    cefTRIAXone (ROCEPHIN) 1 g, lidocaine (XYLOCAINE) 1 % 3.6 mL for IM use    REFERRAL TO WOUND CLINIC    amoxicillin-clavulanate (AUGMENTIN) 875-125 MG Tab    doxycycline (VIBRAMYCIN) 100 MG Tab    DX-TOE(S) 2+ RIGHT   2. Acute otitis externa of right ear, unspecified type  ofloxacin otic sol (FLOXIN OTIC) 0.3 % Solution                 DX toe reviewed by myself, radiology reading \"There is no plain film evidence of soft tissue abscess or osteomyelitis.\"      The patient is a non-toxic, well appearing diabetic who presents with a toe ulceration. X-ray is negative for acute process. He is " given Rocephin in office and an urgent referral to wound care. He will also be placed on Augmentin and Doxy. STRICT ER precautions advised. In addition, ear complaints and presentation consistent with otitis externa, ofloxacin as directed. Keep ear clean and dry. Monitor BS closely, diet considerations advised, medications as directed.   Supportive care, differential diagnoses, and indications for immediate follow-up discussed with patient.   Pathogenesis of diagnosis discussed including typical length and natural progression.   Instructed to return to clinic or nearest emergency department for any change in condition, further concerns, or worsening of symptoms.  Patient states understanding of the plan of care and discharge instructions.  Instructed to make an appointment, for follow up, with his primary care provider.        Please note that this dictation was created using voice recognition software. I have made every reasonable attempt to correct obvious errors, but I expect that there are errors of grammar and possibly content that I did not discover before finalizing the note.      FARRAH Rosas.       263

## 2019-07-28 NOTE — LETTER
July 28, 2019         Patient: Luis Alfredo Javed   YOB: 1980   Date of Visit: 7/28/2019           To Whom it May Concern:    Luis Alfredo Javed was seen in my clinic on 7/28/2019. He may be excused from work for his next three shifts.     If you have any questions or concerns, please don't hesitate to call.        Sincerely,           FARRAH Rosas.  Electronically Signed

## 2019-07-30 ENCOUNTER — OCCUPATIONAL MEDICINE (OUTPATIENT)
Dept: URGENT CARE | Facility: PHYSICIAN GROUP | Age: 39
End: 2019-07-30
Payer: COMMERCIAL

## 2019-07-30 ENCOUNTER — HOSPITAL ENCOUNTER (EMERGENCY)
Facility: MEDICAL CENTER | Age: 39
End: 2019-07-30
Attending: EMERGENCY MEDICINE
Payer: COMMERCIAL

## 2019-07-30 VITALS
DIASTOLIC BLOOD PRESSURE: 74 MMHG | HEART RATE: 84 BPM | SYSTOLIC BLOOD PRESSURE: 138 MMHG | TEMPERATURE: 97.5 F | RESPIRATION RATE: 16 BRPM | OXYGEN SATURATION: 99 % | BODY MASS INDEX: 41.75 KG/M2 | WEIGHT: 315 LBS | HEIGHT: 73 IN

## 2019-07-30 VITALS
RESPIRATION RATE: 16 BRPM | HEART RATE: 85 BPM | WEIGHT: 315 LBS | OXYGEN SATURATION: 97 % | SYSTOLIC BLOOD PRESSURE: 123 MMHG | HEIGHT: 73 IN | TEMPERATURE: 97.4 F | DIASTOLIC BLOOD PRESSURE: 87 MMHG | BODY MASS INDEX: 41.75 KG/M2

## 2019-07-30 DIAGNOSIS — L97.519 DIABETIC ULCER OF TOE OF RIGHT FOOT ASSOCIATED WITH TYPE 2 DIABETES MELLITUS, UNSPECIFIED ULCER STAGE (HCC): ICD-10-CM

## 2019-07-30 DIAGNOSIS — E11.621 DIABETIC ULCER OF TOE OF RIGHT FOOT ASSOCIATED WITH TYPE 2 DIABETES MELLITUS, UNSPECIFIED ULCER STAGE (HCC): ICD-10-CM

## 2019-07-30 DIAGNOSIS — L81.9 DISCOLORATION OF SKIN OF TOE: ICD-10-CM

## 2019-07-30 DIAGNOSIS — L03.032 CELLULITIS OF SECOND TOE OF LEFT FOOT: ICD-10-CM

## 2019-07-30 LAB
ALBUMIN SERPL BCP-MCNC: 4.3 G/DL (ref 3.2–4.9)
ALBUMIN/GLOB SERPL: 1.2 G/DL
ALP SERPL-CCNC: 96 U/L (ref 30–99)
ALT SERPL-CCNC: 51 U/L (ref 2–50)
ANION GAP SERPL CALC-SCNC: 8 MMOL/L (ref 0–11.9)
AST SERPL-CCNC: 32 U/L (ref 12–45)
BASOPHILS # BLD AUTO: 0.6 % (ref 0–1.8)
BASOPHILS # BLD: 0.06 K/UL (ref 0–0.12)
BILIRUB SERPL-MCNC: 0.7 MG/DL (ref 0.1–1.5)
BUN SERPL-MCNC: 11 MG/DL (ref 8–22)
CALCIUM SERPL-MCNC: 9.5 MG/DL (ref 8.4–10.2)
CHLORIDE SERPL-SCNC: 100 MMOL/L (ref 96–112)
CO2 SERPL-SCNC: 29 MMOL/L (ref 20–33)
CREAT SERPL-MCNC: 0.77 MG/DL (ref 0.5–1.4)
EOSINOPHIL # BLD AUTO: 0.11 K/UL (ref 0–0.51)
EOSINOPHIL NFR BLD: 1.1 % (ref 0–6.9)
ERYTHROCYTE [DISTWIDTH] IN BLOOD BY AUTOMATED COUNT: 49.1 FL (ref 35.9–50)
GLOBULIN SER CALC-MCNC: 3.7 G/DL (ref 1.9–3.5)
GLUCOSE SERPL-MCNC: 133 MG/DL (ref 65–99)
HCT VFR BLD AUTO: 46.4 % (ref 42–52)
HGB BLD-MCNC: 15.4 G/DL (ref 14–18)
IMM GRANULOCYTES # BLD AUTO: 0.09 K/UL (ref 0–0.11)
IMM GRANULOCYTES NFR BLD AUTO: 0.9 % (ref 0–0.9)
LYMPHOCYTES # BLD AUTO: 3.42 K/UL (ref 1–4.8)
LYMPHOCYTES NFR BLD: 33.1 % (ref 22–41)
MCH RBC QN AUTO: 31.8 PG (ref 27–33)
MCHC RBC AUTO-ENTMCNC: 33.2 G/DL (ref 33.7–35.3)
MCV RBC AUTO: 95.9 FL (ref 81.4–97.8)
MONOCYTES # BLD AUTO: 0.68 K/UL (ref 0–0.85)
MONOCYTES NFR BLD AUTO: 6.6 % (ref 0–13.4)
NEUTROPHILS # BLD AUTO: 5.98 K/UL (ref 1.82–7.42)
NEUTROPHILS NFR BLD: 57.7 % (ref 44–72)
NRBC # BLD AUTO: 0.03 K/UL
NRBC BLD-RTO: 0.3 /100 WBC
PLATELET # BLD AUTO: 225 K/UL (ref 164–446)
PMV BLD AUTO: 10.5 FL (ref 9–12.9)
POTASSIUM SERPL-SCNC: 4.6 MMOL/L (ref 3.6–5.5)
PROT SERPL-MCNC: 8 G/DL (ref 6–8.2)
RBC # BLD AUTO: 4.84 M/UL (ref 4.7–6.1)
SODIUM SERPL-SCNC: 137 MMOL/L (ref 135–145)
WBC # BLD AUTO: 10.3 K/UL (ref 4.8–10.8)

## 2019-07-30 PROCEDURE — 85025 COMPLETE CBC W/AUTO DIFF WBC: CPT

## 2019-07-30 PROCEDURE — 80053 COMPREHEN METABOLIC PANEL: CPT

## 2019-07-30 PROCEDURE — 99283 EMERGENCY DEPT VISIT LOW MDM: CPT

## 2019-07-30 PROCEDURE — 36415 COLL VENOUS BLD VENIPUNCTURE: CPT

## 2019-07-30 PROCEDURE — 99215 OFFICE O/P EST HI 40 MIN: CPT | Performed by: PHYSICIAN ASSISTANT

## 2019-07-30 RX ORDER — AMOXICILLIN AND CLAVULANATE POTASSIUM 875; 125 MG/1; MG/1
1 TABLET, FILM COATED ORAL 2 TIMES DAILY
Status: SHIPPED | COMMUNITY
Start: 2019-07-29 | End: 2019-09-13

## 2019-07-30 RX ORDER — DOXYCYCLINE HYCLATE 100 MG
100 TABLET ORAL 2 TIMES DAILY
Status: SHIPPED | COMMUNITY
End: 2019-09-13

## 2019-07-30 ASSESSMENT — PAIN SCALES - WONG BAKER: WONGBAKER_NUMERICALRESPONSE: DOESN'T HURT AT ALL

## 2019-07-30 NOTE — ED NOTES
Pt D/C to home. IV removed. D/C instructions given. Pt v/u. Pt leaves ED with no acute changes, complaints or concerns.

## 2019-07-30 NOTE — PROGRESS NOTES
"Subjective:      Luis Alfredo Javed is a 39 y.o. 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 Right 2nd toe blister and infection. Pt is a . He developed a blister in the bottom on 2nd digit right foot which has since worsened. Was seen UC, not as WC, 2 days ago. Xray NEG for osteomyelitis. Given Rocephin inj and doxy + Augmentin. Pt type 2 diabetic, sugars have been high. Since then toe has worsen. Toe is turning black. Denies fevers, chills, nausea. Some pain. Previous injuries: None but DM type 2. No 2nd job.     HPI    ROS    Medications, Allergies, and current problem list reviewed today in Epic     Objective:     /74   Pulse 84   Temp 36.4 °C (97.5 °F) (Temporal)   Resp 16   Ht 1.854 m (6' 1\")   Wt (!) 161 kg (355 lb)   SpO2 99%   BMI 46.84 kg/m²      Physical Exam    Diabetic foot ulcer distal plantar right second digit.  Surrounding soft tissue swelling.  Tissue is turning a necrotic color and it is spreading proximally.  Patient has chronic paresthesia with numbness.       Assessment/Plan:     1. Diabetic ulcer of toe of right foot associated with type 2 diabetes mellitus, unspecified ulcer stage (HCC)       Worsening infection of toe despite dual oral antibiotics.  Patient referred to ER for further work-up and management    Please note that this dictation was created using voice recognition software. I have made every reasonable attempt to correct obvious errors, but I expect that there are errors of grammar and possibly content that I did not discover before finalizing the note.      "

## 2019-07-30 NOTE — ED NOTES
Med rec updated and complete  Allergies reviewed  Interviewed pt with wife at bedside with permission from pt.  Pts wife had a list of medications, went over list of medications and returned list of medications back to pts wife.

## 2019-07-30 NOTE — LETTER
"EMPLOYEE’S CLAIM FOR COMPENSATION/ REPORT OF INITIAL TREATMENT  FORM C-4    EMPLOYEE’S CLAIM - PROVIDE ALL INFORMATION REQUESTED   First Name  Luis Alfredo Last Name  Javed Birthdate                    1980                Sex  male Claim Number   Home Address  [address]  Age  39 y.o. 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 (if applicable)  Walking    How did this injury or occupational disease occur? (Be specific an answer in detail. Use additional sheet if necessary)  Got a blister on right toe (second toe). That blister popped, and another blister formed in same spot. Toe became severely swollen and purple over next several days.   If you believe that you have an occupational disease, when did you first have knowledge of the disability and it relationship to your employment?  N/A Witnesses to the Accident  Wife      Nature of Injury or Occupational Disease  Contusion  Part(s) of Body Injured or Affected  Foot (R), ,     I certify that the above is true and correct to the best of my knowledge and that I have provided this information in order to obtain the " benefits of [place]  I hereby authorize any physician, chiropractor, surgeon, practitioner, or other person, any hospital, including [facility] or [facility] hospital, any medical service organization, any insurance company, or other institution or organization to release to each other, any medical or other information, including benefits paid or payable, pertinent to this injury or disease, except information relative to diagnosis, treatment and/or counseling for AIDS, psychological conditions, alcohol or controlled substances, for which I must give specific authorization.  A Photostat of this authorization shall be as valid as the original.     Date   Place   Employee’s Signature   THIS REPORT MUST BE COMPLETED AND MAILED WITHIN 3 WORKING DAYS OF TREATMENT   Place  [facility]  Name of Facility  [facility]   Date  7/30/2019 Diagnosis  (E11.621,  L97.519) Diabetic ulcer of toe of right foot associated with type 2 diabetes mellitus, unspecified ulcer stage (HCC) Is there evidence the injured employee was under the influence of alcohol and/or another controlled substance at the time of accident?   Hour  10:47 AM Description of Injury or Disease  The encounter diagnosis was Diabetic ulcer of toe of right foot associated with type 2 diabetes mellitus, unspecified ulcer stage (HCC). No   Treatment  Worsening diabetic foot ulcer despite aggressive oral antibiotics.  Patient sent to ER for further evaluation  Have you advised the patient to remain off work five days or more? No   X-Ray Findings      If Yes   From Date  To Date      From information given by the employee, together with medical evidence, can you directly connect this injury or occupational disease as job incurred?    Comments:??? Indeterminate If No Full Duty  No Modified Duty  Yes   Is additional medical care by a  "physician indicated?  Yes If Modified Duty, Specify any Limitations / Restrictions  Limited walking   Do you know of any previous injury or disease contributing to this condition or occupational disease?                            No  Comments:DM 2   Date  7/30/2019 Print Doctor’s Name Jairo Montemayor P.A.-C. I certify the employer’s copy of  this form was mailed on:   Address  [address] [place] [address]. [id number] Insurer’s Use Only     [facility]  [address]    Provider’s Tax ID Number  [id number] Telephone  Dept: [phone]        ariel-JAIRO Small P.A.-C.   e-Signature: Dr. Kb Langford, Medical Director Degree  ROCIO        ORIGINAL-TREATING PHYSICIAN OR CHIROPRACTOR    PAGE 2-INSURER/TPA    PAGE 3-EMPLOYER    PAGE 4-EMPLOYEE             Form C-4 (rev10/07)              BRIEF DESCRIPTION OF RIGHTS AND BENEFITS  (Pursuant to [place] 616C.050)    Notice of Injury or Occupational Disease (Incident Report Form C-1): If an injury or occupational disease (OD) arises out of and in the  course of employment, you must provide written notice to your employer as soon as practicable, but no later than 7 days after the accident or  OD. Your employer shall maintain a sufficient supply of the required forms.    Claim for Compensation (Form C-4): If medical treatment is sought, the form C-4 is available at the place of initial treatment. A completed  \"Claim for Compensation\" (Form C-4) must be filed within 90 days after an accident or OD. The treating physician or chiropractor must,  within 3 working days after treatment, complete and mail to the employer, the employer's insurer and third-party , the Claim for  Compensation.    Medical Treatment: If you require medical treatment for your on-the-job injury or OD, you may be required to select a physician or  chiropractor from a list provided by your workers’ compensation insurer, if it has contracted with an Organization for Managed Care (MCO) or  Preferred " Provider Organization (PPO) or providers of health care. If your employer has not entered into a contract with an MCO or PPO, you  may select a physician or chiropractor from the Panel of Physicians and Chiropractors. Any medical costs related to your industrial injury or  OD will be paid by your insurer.    Temporary Total Disability (TTD): If your doctor has certified that you are unable to work for a period of at least 5 consecutive days, or 5  cumulative days in a 20-day period, or places restrictions on you that your employer does not accommodate, you may be entitled to TTD  compensation.    Temporary Partial Disability (TPD): If the wage you receive upon reemployment is less than the compensation for TTD to which you are  entitled, the insurer may be required to pay you TPD compensation to make up the difference. TPD can only be paid for a maximum of 24  months.    Permanent Partial Disability (PPD): When your medical condition is stable and there is an indication of a PPD as a result of your injury or  OD, within 30 days, your insurer must arrange for an evaluation by a rating physician or chiropractor to determine the degree of your PPD. The  amount of your PPD award depends on the date of injury, the results of the PPD evaluation and your age and wage.    Permanent Total Disability (PTD): If you are medically certified by a treating physician or chiropractor as permanently and totally disabled  and have been granted a PTD status by your insurer, you are entitled to receive monthly benefits not to exceed 66 2/3% of your average  monthly wage. The amount of your PTD payments is subject to reduction if you previously received a PPD award.    Vocational Rehabilitation Services: You may be eligible for vocational rehabilitation services if you are unable to return to the job due to a  permanent physical impairment or permanent restrictions as a result of your injury or occupational disease.    Transportation and  Per Ray Reimbursement: You may be eligible for travel expenses and per ray associated with medical treatment.    Reopening: You may be able to reopen your claim if your condition worsens after claim closure.    Appeal Process: If you disagree with a written determination issued by the insurer or the insurer does not respond to your request, you may  appeal to the Department of Administration, , by following the instructions contained in your determination letter. You must  appeal the determination within 70 days from the date of the determination letter at [address], [address], [place], [place]  [address], or [address], [address], [place], [place] [address]. If you disagree with the  decision, you may appeal to the  Department of Administration, . You must file your appeal within 30 days from the date of the  decision  letter at [address], [address], [place], [place] [address], or [address], [place], [place] [address]. If you  disagree with a decision of an , you may file a petition for judicial review with the District Court. You must do so within 30  days of the Appeal Officer’s decision. You may be represented by an  at your own expense or you may contact the [facility] for possible  representation.    [place]  for Injured Workers (NAIW): If you disagree with a  decision, you may request that NAIW represent you  without charge at an  Hearing. For information regarding denial of benefits, you may contact the [facility] at: [address], [address], [place] [address], [phone]) [phone], or [address], [address], [place] [address], [phone]    To File a Complaint with the Division: If you wish to file a complaint with the  of the Division of Industrial Relations (DIR),  please contact the Workers’ Compensation  Section, [address], [address], [place], [place] [address], telephone (750) [phone], or  [address], [address], [place], [place] [address], telephone [phone].    For assistance with Workers’ Compensation Issues: you may contact the Office of the Governor Consumer Health Assistance, [address] [place] [address], [address], [place], [place] [address], Toll Free 1-919.526.1327, Web site: http://govcha.[facility].nv., E-mail  [email or web address].                                                                                                                                                                                                                                   __________________________________________________________________                                                                   _________________                Employee Name / Signature                                                                                                                                                       Date                                                                                                                                                                                                     D-2 (rev. 10/07)

## 2019-07-30 NOTE — ED TRIAGE NOTES
Chief Complaint   Patient presents with   • Toe Injury     Pt reports he developed a blister to 2nd rt toe. Toe now dark in color with distal open wound.    Pt diabetic,  sent by urgent care today.

## 2019-07-30 NOTE — DISCHARGE INSTRUCTIONS
See a doctor for recheck if no improvement in 2 days.  Elevate your foot when possible.  Return for spreading redness, fever, pus draining from the wound, or if worse or for any concerns.  Please continue to take your antibiotics.

## 2019-07-30 NOTE — LETTER
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    Medical Information   Related to Industrial Injury?   ***   Subjective Complaints:      Objective Findings:     Pre-Existing Condition(s):     Assessment:        Status:    Permanent Disability:     Plan:      Diagnostics:      Comments:       Disability Information   Status:      From:     Through:   Restrictions are:     Physical Restrictions   Sitting:    Standing:    Stooping:    Bending:      Squatting:    Walking:    Climbing:    Pushing:      Pulling:    Other:    Reaching Above Shoulder (L):   Reaching Above Shoulder (R):       Reaching Below Shoulder (L):    Reaching Below Shoulder (R):      Not to exceed Weight Limits   Carrying(hrs):   Weight Limit(lb):   Lifting(hrs):   Weight  Limit(lb):     Comments:      Repetitive Actions   Hands: i.e. Fine Manipulations from Grasping:     Feet: i.e. Operating Foot Controls:     Driving / Operate Machinery:     Physician Name: Rubin Avila Physician Signature:   e-Signature:  , Medical Director   Clinic Name / Location: [facility], EMERGENCY DEPT  [address] [place] [id number] [phone]     Clinic Phone Number: Dept: [phone]   Appointment Time:  Visit Start Time:    Check-In Time:  12:09 PM Visit Discharge Time:    Original-Treating Physician or Chiropractor    Page 2-Insurer/TPA    Page 3-Employer    Page 4-Employee

## 2019-07-30 NOTE — LETTER
[facility], EMERGENCY DEPT   [address] [place] [address]  Phone: Dept: [phone] - Fax:        [facility] Progress Report and Disability Certification  Date of Service: 7/30/2019   No Show:  No  Date / Time of Next Visit:     Claim Information   Patient Name: Luis Alfredo Javed  Claim Number:     Employer: [employer]  Date of Injury: 7/17/2019     Insurer / TPA: ESIS ID / SSN:    Occupation: Sales Diagnosis: Diagnoses of Discoloration of skin of toe and Cellulitis of second toe of left foot were pertinent to this visit.    Medical Information   Related to Industrial Injury?   ***   Subjective Complaints:      Objective Findings:     Pre-Existing Condition(s):     Assessment:        Status:    Permanent Disability:     Plan:      Diagnostics:      Comments:       Disability Information   Status:      From:     Through:   Restrictions are:     Physical Restrictions   Sitting:    Standing:    Stooping:    Bending:      Squatting:    Walking:    Climbing:    Pushing:      Pulling:    Other:    Reaching Above Shoulder (L):   Reaching Above Shoulder (R):       Reaching Below Shoulder (L):    Reaching Below Shoulder (R):      Not to exceed Weight Limits   Carrying(hrs):   Weight Limit(lb):   Lifting(hrs):   Weight  Limit(lb):     Comments:      Repetitive Actions   Hands: i.e. Fine Manipulations from Grasping:     Feet: i.e. Operating Foot Controls:     Driving / Operate Machinery:     Physician Name: Rubin Avila Physician Signature:   e-Signature:  , Medical Director   Clinic Name / Location: [facility], EMERGENCY DEPT  [address] [place] [address] [phone]     Clinic Phone Number: Dept: [phone]   Appointment Time:  Visit Start Time:    Check-In Time:  12:09 PM Visit Discharge Time:    Original-Treating Physician or Chiropractor    Page 2-Insurer/TPA     Page 3-Employer    Page 4-Employee

## 2019-07-30 NOTE — LETTER
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    Medical Information   Related to Industrial Injury?   ***   Subjective Complaints:      Objective Findings:     Pre-Existing Condition(s):     Assessment:        Status:    Permanent Disability:     Plan:      Diagnostics:      Comments:       Disability Information   Status:      From:     Through:   Restrictions are:     Physical Restrictions   Sitting:    Standing:    Stooping:    Bending:      Squatting:    Walking:    Climbing:    Pushing:      Pulling:    Other:    Reaching Above Shoulder (L):   Reaching Above Shoulder (R):       Reaching Below Shoulder (L):    Reaching Below Shoulder (R):      Not to exceed Weight Limits   Carrying(hrs):   Weight Limit(lb):   Lifting(hrs):   Weight  Limit(lb):     Comments:      Repetitive Actions   Hands: i.e. Fine Manipulations from Grasping:     Feet: i.e. Operating Foot Controls:     Driving / Operate Machinery:     Physician Name:   Physician Signature:   e-Signature:  , Medical Director   Clinic Name / Location: [facility], EMERGENCY DEPT  [address] [place] [address] [phone]     Clinic Phone Number: Dept: [phone]   Appointment Time:  Visit Start Time:    Check-In Time:  12:09 PM Visit Discharge Time:    Original-Treating Physician or Chiropractor    Page 2-Insurer/TPA    Page 3-Employer    Page 4-Employee

## 2019-07-30 NOTE — ED PROVIDER NOTES
ED Provider Note    CHIEF COMPLAINT   Chief Complaint   Patient presents with   • Toe Injury     Pt reports he developed a blister to 2nd rt toe. Toe now dark in color with distal open wound.        HPI   Luis Alfredo Javed is a 39 y.o. male who presents sent from urgent care for concern over ischemic toe.  Patient has diabetes, states that 3 weeks ago developed a blistering to the tip of the second right toe.  Patient states he has neuropathy in both first and second toe secondary to his diabetes.  He started on doxycycline and Augmentin 2 days ago.  Recheck today at urgent care was concerning for worsening infection or ischemia therefore sent to the ER.  No fever or chills, no chest pain.  Patient states blister is now healing however the toe remains persistently swollen    Patient states x-ray of his right second toe was negative for fracture    REVIEW OF SYSTEMS   Musculoskeletal: Right toe pain  Neurologic: Neuropathy  Skin: Swelling, dusky skin color, blister right second toe      PAST MEDICAL HISTORY   Past Medical History:   Diagnosis Date   • Chest pain     x 5 years.   • Hypertension    • Type II or unspecified type diabetes mellitus without mention of complication, not stated as uncontrolled        FAMILY HISTORY  Family History   Problem Relation Age of Onset   • Diabetes Mother    • Stroke Father    • Alcohol/Drug Maternal Grandfather    • Diabetes Paternal Grandfather    • Cancer Paternal Grandfather         lung   • Heart Disease Neg Hx        SOCIAL HISTORY  Social History     Social History   • Marital status:      Spouse name: N/A   • Number of children: N/A   • Years of education: N/A     Social History Main Topics   • Smoking status: Current Every Day Smoker     Packs/day: 1.50     Years: 21.00     Types: Cigarettes   • Smokeless tobacco: Never Used   • Alcohol use No   • Drug use: No      Comment: 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   • Sexual activity: Not on file     Other Topics Concern   •  "Not on file     Social History Narrative   • No narrative on file       SURGICAL HISTORY  Past Surgical History:   Procedure Laterality Date   • HERNIA REPAIR  age 4   • STRABISMUS REPAIR  age 5       CURRENT MEDICATIONS   Home Medications     Reviewed by Viki De Paz (Pharmacy Tech) on 07/30/19 at 1347  Med List Status: Complete   Medication Last Dose Status   amoxicillin-clavulanate (AUGMENTIN) 875-125 MG Tab 7/30/2019 Active   aspirin EC (ECOTRIN) 81 MG TBEC 7/30/2019 Active   doxycycline (VIBRAMYCIN) 100 MG Tab 7/30/2019 Active   gabapentin (NEURONTIN) 300 MG Cap 7/30/2019 Active   lisinopril (PRINIVIL) 10 MG Tab 7/30/2019 Active   metFORMIN (GLUCOPHAGE) 500 MG Tab 7/30/2019 Active   metoprolol SR (TOPROL XL) 50 MG TABLET SR 24 HR 7/30/2019 Active   multivitamin (THERAGRAN) Tab 7/30/2019 Active   pioglitazone (ACTOS) 45 MG Tab 7/30/2019 Active   ranitidine (ZANTAC) 300 MG tablet 7/30/2019 Active   sertraline (ZOLOFT) 100 MG Tab 7/29/2019 Active   simvastatin (ZOCOR) 10 MG Tab 7/29/2019 Active                ALLERGIES   No Known Allergies    PHYSICAL EXAM  VITAL SIGNS: /87   Pulse 86   Temp 36.3 °C (97.4 °F) (Temporal)   Resp 16   Ht 1.854 m (6' 1\")   Wt (!) 162 kg (357 lb 2.3 oz)   SpO2 92%   BMI 47.12 kg/m²   Skin: Dusky appearance of the right second toe however capillary refill is normal.  He does appear to be subungual hematoma.  There is eschar to the tip of the toe.  No proximal lymphangitis.  Right second toe swollen compared to the other toes.  No warmth, no acute cellulitic change or proximal lymphangitis.  Unable to express pus from under the eschar distal toe.  Vascular: Intact distal capillary refill.  Normal dorsal pedis pulse right foot  Musculoskeletal: Flexion extension of the toes of the right foot intact.  No palpable deformity.  Neurologic: Diminished sensation toes 1 to right foot    Labs  Results for orders placed or performed during the hospital encounter of 07/30/19 "   CBC WITH DIFFERENTIAL   Result Value Ref Range    WBC 10.3 4.8 - 10.8 K/uL    RBC 4.84 4.70 - 6.10 M/uL    Hemoglobin 15.4 14.0 - 18.0 g/dL    Hematocrit 46.4 42.0 - 52.0 %    MCV 95.9 81.4 - 97.8 fL    MCH 31.8 27.0 - 33.0 pg    MCHC 33.2 (L) 33.7 - 35.3 g/dL    RDW 49.1 35.9 - 50.0 fL    Platelet Count 225 164 - 446 K/uL    MPV 10.5 9.0 - 12.9 fL    Neutrophils-Polys 57.70 44.00 - 72.00 %    Lymphocytes 33.10 22.00 - 41.00 %    Monocytes 6.60 0.00 - 13.40 %    Eosinophils 1.10 0.00 - 6.90 %    Basophils 0.60 0.00 - 1.80 %    Immature Granulocytes 0.90 0.00 - 0.90 %    Nucleated RBC 0.30 /100 WBC    Neutrophils (Absolute) 5.98 1.82 - 7.42 K/uL    Lymphs (Absolute) 3.42 1.00 - 4.80 K/uL    Monos (Absolute) 0.68 0.00 - 0.85 K/uL    Eos (Absolute) 0.11 0.00 - 0.51 K/uL    Baso (Absolute) 0.06 0.00 - 0.12 K/uL    Immature Granulocytes (abs) 0.09 0.00 - 0.11 K/uL    NRBC (Absolute) 0.03 K/uL   COMP METABOLIC PANEL   Result Value Ref Range    Sodium 137 135 - 145 mmol/L    Potassium 4.6 3.6 - 5.5 mmol/L    Chloride 100 96 - 112 mmol/L    Co2 29 20 - 33 mmol/L    Anion Gap 8.0 0.0 - 11.9    Glucose 133 (H) 65 - 99 mg/dL    Bun 11 8 - 22 mg/dL    Creatinine 0.77 0.50 - 1.40 mg/dL    Calcium 9.5 8.4 - 10.2 mg/dL    AST(SGOT) 32 12 - 45 U/L    ALT(SGPT) 51 (H) 2 - 50 U/L    Alkaline Phosphatase 96 30 - 99 U/L    Total Bilirubin 0.7 0.1 - 1.5 mg/dL    Albumin 4.3 3.2 - 4.9 g/dL    Total Protein 8.0 6.0 - 8.2 g/dL    Globulin 3.7 (H) 1.9 - 3.5 g/dL    A-G Ratio 1.2 g/dL   ESTIMATED GFR   Result Value Ref Range    GFR If African American >60 >60 mL/min/1.73 m 2    GFR If Non African American >60 >60 mL/min/1.73 m 2         COURSE & MEDICAL DECISION MAKING  Pertinent Labs & Imaging studies reviewed. (See chart for details)  Patient with normal white blood cell count, no evidence of sepsis.  Differential diagnosis includes healing local trauma secondary to the patient's neuropathy and job which requires him to walk for long  distances.  The blistering appears to have scabbed over, unable to express pus from this.  There is no evidence of arterial insufficiency to the right foot given bounding pulse and normal capillary refill.  Toe does not appear gangrenous at this time, there is no foul odor to it.  This may also be swelling from a resolving cellulitis, patient is currently on 2 medications for this.  I discussed with the patient admission to the hospital for observation and ongoing work-up versus going home, returning if things worsen and following up with his primary doctor soon as possible.  Patient expresses interest in going home, given above exam, laboratory evaluation normal vital signs I feel this is reasonable as he is willing to return if worse.    FINAL IMPRESSION     1. Discoloration of skin of toe    2. Cellulitis of second toe of left foot              Electronically signed by: Rubin Avila, 7/30/2019 1:54 PM

## 2019-07-30 NOTE — LETTER
[facility]  [address] [place] [address]. [address] - [place] [address]  Phone:  [phone] - Fax:  [phone]   [facility] Progress Report and Disability Certification  Date of Service: 7/30/2019   No Show:  No  Date / Time of Next Visit:  Sending to ER    Claim Information   Patient Name: Luis Alfredo Javed  Claim Number:     Employer: [employer]  Date of Injury: 7/17/2019     Insurer / TPA:    ID / SSN:     Occupation: Sales  Diagnosis: The encounter diagnosis was Diabetic ulcer of toe of right foot associated with type 2 diabetes mellitus, unspecified ulcer stage (HCC).    Medical Information   Related to Industrial Injury? No  Comments:?? indeterminate    Subjective Complaints:  DOI: 7/17/19. Right 2nd toe blister and infection. Pt is a . He developed a blister in the bottom on 2nd digit right foot which has since worsened. Was seen UC, not as WC, 2 days ago. Xray NEG for osteomyelitis. Given Rocephin inj and doxy + Augmentin. Pt type 2 diabetic, sugars have been high. Since then toe has worsen. Toe is turning black. Denies fevers, chills, nausea. Some pain. Previous injuries: None but DM type 2. No 2nd job.   Objective Findings: Diabetic foot ulcer distal plantar right second digit.  Surrounding soft tissue swelling.  Tissue is turning a necrotic color and it is spreading proximally.  Patient has chronic paresthesia with numbness.   Pre-Existing Condition(s):     Assessment:   Initial Visit    Status: Additional Care Required  Permanent Disability:No    Plan:      Diagnostics:      Comments:       Disability Information   Status:      From:     Through:   Restrictions are:     Physical Restrictions   Sitting:    Standing:    Stooping:    Bending:      Squatting:    Walking:    Climbing:    Pushing:      Pulling:    Other:    Reaching Above Shoulder (L):   Reaching Above Shoulder (R):       Reaching Below Shoulder (L):    Reaching Below  Shoulder (R):      Not to exceed Weight Limits   Carrying(hrs):   Weight Limit(lb):   Lifting(hrs):   Weight  Limit(lb):     Comments:      Repetitive Actions   Hands: i.e. Fine Manipulations from Grasping:     Feet: i.e. Operating Foot Controls:     Driving / Operate Machinery:     Physician Name: Jairo Montemayor P.A.-C. Physician Signature: JAIRO Hidalgo P.A.-C. e-Signature: Dr. Kb Langford, Medical Director   Clinic Name / Location: [address]  [place] [address] Clinic Phone Number: Dept: [phone]   Appointment Time: 10:20 Am Visit Start Time: 10:47 AM   Check-In Time:  10:32 Am Visit Discharge Time: 11:14 AM   Original-Treating Physician or Chiropractor    Page 2-Insurer/TPA    Page 3-Employer    Page 4-Employee

## 2019-07-30 NOTE — LETTER
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    Medical Information   Related to Industrial Injury?   ***   Subjective Complaints:      Objective Findings:     Pre-Existing Condition(s):     Assessment:        Status:    Permanent Disability:     Plan:      Diagnostics:      Comments:       Disability Information   Status:      From:     Through:   Restrictions are:     Physical Restrictions   Sitting:    Standing:    Stooping:    Bending:      Squatting:    Walking:    Climbing:    Pushing:      Pulling:    Other:    Reaching Above Shoulder (L):   Reaching Above Shoulder (R):       Reaching Below Shoulder (L):    Reaching Below Shoulder (R):      Not to exceed Weight Limits   Carrying(hrs):   Weight Limit(lb):   Lifting(hrs):   Weight  Limit(lb):     Comments:      Repetitive Actions   Hands: i.e. Fine Manipulations from Grasping:     Feet: i.e. Operating Foot Controls:     Driving / Operate Machinery:     Physician Name: Rubin Avila Physician Signature:   e-Signature:  , Medical Director   Clinic Name / Location: [facility], EMERGENCY DEPT  [address] [place] [id number] [phone]     Clinic Phone Number: Dept: [phone]   Appointment Time:  Visit Start Time:    Check-In Time:  12:09 PM Visit Discharge Time:    Original-Treating Physician or Chiropractor    Page 2-Insurer/TPA    Page 3-Employer    Page 4-Employee

## 2019-08-05 RX ORDER — SERTRALINE HYDROCHLORIDE 100 MG/1
TABLET, FILM COATED ORAL
Qty: 30 TAB | Refills: 1 | Status: SHIPPED | OUTPATIENT
Start: 2019-08-05 | End: 2020-07-11

## 2019-08-05 RX ORDER — PIOGLITAZONEHYDROCHLORIDE 45 MG/1
TABLET ORAL
Qty: 30 TAB | Refills: 1 | Status: SHIPPED | OUTPATIENT
Start: 2019-08-05 | End: 2021-10-06

## 2019-09-13 ENCOUNTER — HOSPITAL ENCOUNTER (EMERGENCY)
Facility: MEDICAL CENTER | Age: 39
End: 2019-09-13
Attending: EMERGENCY MEDICINE
Payer: MEDICAID

## 2019-09-13 ENCOUNTER — APPOINTMENT (OUTPATIENT)
Dept: RADIOLOGY | Facility: MEDICAL CENTER | Age: 39
End: 2019-09-13
Attending: EMERGENCY MEDICINE
Payer: MEDICAID

## 2019-09-13 VITALS
HEART RATE: 85 BPM | RESPIRATION RATE: 16 BRPM | HEIGHT: 73 IN | DIASTOLIC BLOOD PRESSURE: 92 MMHG | TEMPERATURE: 98.6 F | BODY MASS INDEX: 41.75 KG/M2 | WEIGHT: 315 LBS | SYSTOLIC BLOOD PRESSURE: 137 MMHG | OXYGEN SATURATION: 96 %

## 2019-09-13 DIAGNOSIS — L08.9 TOE INFECTION: ICD-10-CM

## 2019-09-13 LAB
ALBUMIN SERPL BCP-MCNC: 4.3 G/DL (ref 3.2–4.9)
ALBUMIN/GLOB SERPL: 1.4 G/DL
ALP SERPL-CCNC: 95 U/L (ref 30–99)
ALT SERPL-CCNC: 41 U/L (ref 2–50)
ANION GAP SERPL CALC-SCNC: 9 MMOL/L (ref 0–11.9)
AST SERPL-CCNC: 18 U/L (ref 12–45)
BASOPHILS # BLD AUTO: 0.6 % (ref 0–1.8)
BASOPHILS # BLD: 0.05 K/UL (ref 0–0.12)
BILIRUB SERPL-MCNC: 0.3 MG/DL (ref 0.1–1.5)
BUN SERPL-MCNC: 10 MG/DL (ref 8–22)
CALCIUM SERPL-MCNC: 9.7 MG/DL (ref 8.4–10.2)
CHLORIDE SERPL-SCNC: 103 MMOL/L (ref 96–112)
CO2 SERPL-SCNC: 27 MMOL/L (ref 20–33)
CREAT SERPL-MCNC: 0.7 MG/DL (ref 0.5–1.4)
CRP SERPL HS-MCNC: 2.6 MG/L (ref 0–7.5)
EOSINOPHIL # BLD AUTO: 0.08 K/UL (ref 0–0.51)
EOSINOPHIL NFR BLD: 1 % (ref 0–6.9)
ERYTHROCYTE [DISTWIDTH] IN BLOOD BY AUTOMATED COUNT: 46.4 FL (ref 35.9–50)
ERYTHROCYTE [SEDIMENTATION RATE] IN BLOOD BY WESTERGREN METHOD: 12 MM/HOUR (ref 0–15)
GLOBULIN SER CALC-MCNC: 3.1 G/DL (ref 1.9–3.5)
GLUCOSE SERPL-MCNC: 146 MG/DL (ref 65–99)
HCT VFR BLD AUTO: 45.4 % (ref 42–52)
HGB BLD-MCNC: 15.4 G/DL (ref 14–18)
IMM GRANULOCYTES # BLD AUTO: 0.04 K/UL (ref 0–0.11)
IMM GRANULOCYTES NFR BLD AUTO: 0.5 % (ref 0–0.9)
LYMPHOCYTES # BLD AUTO: 2.53 K/UL (ref 1–4.8)
LYMPHOCYTES NFR BLD: 31.2 % (ref 22–41)
MCH RBC QN AUTO: 32.4 PG (ref 27–33)
MCHC RBC AUTO-ENTMCNC: 33.9 G/DL (ref 33.7–35.3)
MCV RBC AUTO: 95.4 FL (ref 81.4–97.8)
MONOCYTES # BLD AUTO: 0.57 K/UL (ref 0–0.85)
MONOCYTES NFR BLD AUTO: 7 % (ref 0–13.4)
NEUTROPHILS # BLD AUTO: 4.83 K/UL (ref 1.82–7.42)
NEUTROPHILS NFR BLD: 59.7 % (ref 44–72)
NRBC # BLD AUTO: 0 K/UL
NRBC BLD-RTO: 0 /100 WBC
PLATELET # BLD AUTO: 202 K/UL (ref 164–446)
PMV BLD AUTO: 10.2 FL (ref 9–12.9)
POTASSIUM SERPL-SCNC: 4.6 MMOL/L (ref 3.6–5.5)
PROT SERPL-MCNC: 7.4 G/DL (ref 6–8.2)
RBC # BLD AUTO: 4.76 M/UL (ref 4.7–6.1)
SODIUM SERPL-SCNC: 139 MMOL/L (ref 135–145)
WBC # BLD AUTO: 8.1 K/UL (ref 4.8–10.8)

## 2019-09-13 PROCEDURE — 85025 COMPLETE CBC W/AUTO DIFF WBC: CPT

## 2019-09-13 PROCEDURE — 87077 CULTURE AEROBIC IDENTIFY: CPT

## 2019-09-13 PROCEDURE — 85652 RBC SED RATE AUTOMATED: CPT

## 2019-09-13 PROCEDURE — 86141 C-REACTIVE PROTEIN HS: CPT

## 2019-09-13 PROCEDURE — 87205 SMEAR GRAM STAIN: CPT

## 2019-09-13 PROCEDURE — 80053 COMPREHEN METABOLIC PANEL: CPT

## 2019-09-13 PROCEDURE — 87070 CULTURE OTHR SPECIMN AEROBIC: CPT

## 2019-09-13 PROCEDURE — 73620 X-RAY EXAM OF FOOT: CPT | Mod: RT

## 2019-09-13 PROCEDURE — 36415 COLL VENOUS BLD VENIPUNCTURE: CPT

## 2019-09-13 PROCEDURE — 87186 SC STD MICRODIL/AGAR DIL: CPT

## 2019-09-13 PROCEDURE — 99284 EMERGENCY DEPT VISIT MOD MDM: CPT

## 2019-09-13 RX ORDER — SULFAMETHOXAZOLE AND TRIMETHOPRIM 800; 160 MG/1; MG/1
1 TABLET ORAL 2 TIMES DAILY
Qty: 14 TAB | Refills: 0 | Status: SHIPPED | OUTPATIENT
Start: 2019-09-13 | End: 2019-09-20

## 2019-09-13 RX ORDER — AMOXICILLIN AND CLAVULANATE POTASSIUM 875; 125 MG/1; MG/1
1 TABLET, FILM COATED ORAL 2 TIMES DAILY
Qty: 14 TAB | Refills: 0 | Status: SHIPPED | OUTPATIENT
Start: 2019-09-13 | End: 2019-09-20

## 2019-09-13 NOTE — ED PROVIDER NOTES
ED Provider Note    CHIEF COMPLAINT  Chief Complaint   Patient presents with   • Digit Pain     R foot 3rd toe wound  hx of diabetes       HPI  Luis Alfredo Javed is a 39 y.o. male who presents to the Emergency Department recent trauma and healing of right second toe and now since yesterday redness and swelling of right 3rd digit.  No evidence of new trauma.  He does have DM which is not requiring insulin.  He denies any lack of sensation in his toes.   No fever or chills.  No other complaints      REVIEW OF SYSTEMS  Positive for toe pain, Negative for fever chills or vomiting.  As above all other systems are negative.    PAST MEDICAL HISTORY   has a past medical history of Chest pain, Hypertension, and Type II or unspecified type diabetes mellitus without mention of complication, not stated as uncontrolled.    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    CURRENT MEDICATIONS  Reviewed.  See Encounter Summary.  No current facility-administered medications for this encounter.     Current Outpatient Medications:    Disp: 14 Tab, Rfl: 0  •  pioglitazone (ACTOS) 45 MG Tab, TAKE 1 TABLET BY MOUTH EVERY DAY, Disp: 30 Tab, Rfl: 1  •  sertraline (ZOLOFT) 100 MG Tab, TAKE 1 TABLET BY MOUTH EVERY DAY, Disp: 30 Tab, Rfl: 1  •   "metFORMIN (GLUCOPHAGE) 500 MG Tab, TAKE 2 TABS BY MOUTH 2 TIMES A DAY, WITH MEALS., Disp: 120 Tab, Rfl: 1  •  metoprolol SR (TOPROL XL) 50 MG TABLET SR 24 HR, TAKE 1 TAB BY MOUTH EVERY DAY. TAKE 1 TAB BY MOUTH EVERY DAY., Disp: 30 Tab, Rfl: 1  •  lisinopril (PRINIVIL) 10 MG Tab, Take 1 Tab by mouth every day., Disp: 30 Tab, Rfl: 11  •  gabapentin (NEURONTIN) 300 MG Cap, Take 300 mg by mouth 2 Times a Day., Disp: , Rfl:   •  simvastatin (ZOCOR) 10 MG Tab, Take 1 Tab by mouth every evening., Disp: 30 Tab, Rfl: 11  •  ranitidine (ZANTAC) 300 MG tablet, Take 1 Tab by mouth every day. TAKE 1 TAB BY MOUTH EVERY DAY., Disp: 30 Tab, Rfl: 6  •  multivitamin (THERAGRAN) Tab, Take 1 Tab by mouth every day., Disp: , Rfl:   •  aspirin EC (ECOTRIN) 81 MG TBEC, Take 81 mg by mouth every day., Disp: , Rfl:       ALLERGIES  No Known Allergies    PHYSICAL EXAM  VITAL SIGNS: /92   Pulse 85   Temp 37 °C (98.6 °F) (Temporal)   Resp 16   Ht 1.854 m (6' 1\")   Wt (!) 159.8 kg (352 lb 4.7 oz)   SpO2 96%   BMI 46.48 kg/m²   Constitutional:  Alert , able to answer questions  HENT: Nose is normal in appearance, external ears are normal,  moist mucous membranes  Eyes: Anicteric,  pupils are equal round and reactive, there is no conjunctival drainage or pallor   Neck: The trachea is midline, there is no obvious mass or meningeal signs  Cardiovascular: Good perfusion,  regular rate and rhythm without murmurs gallops or rubs  Thorax & Lungs: Respiratory rate and effort are normal. There is normal chest excursion with respiration.  No wheezes rhonchi or rales noted.  Abdomen: Abdomen is normal in appearance, no gross peritoneal signs  normal bowel sounds, no pain with cough  :  No CVA tenderness to palpation  Musculoskeletal: No deformities noted in all 4 extremities. Left second toe id red swollen on the dorsal surface, there is approximately .5 cm of purultnt blister noted, no food or plantar erythema or swelling noted.  Skin: " Visualized skin is warm without rash.  Neurologic:  Cranial nerves II through XII are intact there is no focal abnormality noted.  Psychiatric: Normal mood and mentation    RADIOLOGY/PROCEDURES  Imaging Studies:    DX-FOOT-2- RIGHT   Final Result      No evidence of fracture or bony destructive change.            Pertinent Labs   Results for orders placed or performed during the hospital encounter of 09/13/19   CBC WITH DIFFERENTIAL   Result Value Ref Range    WBC 8.1 4.8 - 10.8 K/uL    RBC 4.76 4.70 - 6.10 M/uL    Hemoglobin 15.4 14.0 - 18.0 g/dL    Hematocrit 45.4 42.0 - 52.0 %    MCV 95.4 81.4 - 97.8 fL    MCH 32.4 27.0 - 33.0 pg    MCHC 33.9 33.7 - 35.3 g/dL    RDW 46.4 35.9 - 50.0 fL    Platelet Count 202 164 - 446 K/uL    MPV 10.2 9.0 - 12.9 fL    Neutrophils-Polys 59.70 44.00 - 72.00 %    Lymphocytes 31.20 22.00 - 41.00 %    Monocytes 7.00 0.00 - 13.40 %    Eosinophils 1.00 0.00 - 6.90 %    Basophils 0.60 0.00 - 1.80 %    Immature Granulocytes 0.50 0.00 - 0.90 %    Nucleated RBC 0.00 /100 WBC    Neutrophils (Absolute) 4.83 1.82 - 7.42 K/uL    Lymphs (Absolute) 2.53 1.00 - 4.80 K/uL    Monos (Absolute) 0.57 0.00 - 0.85 K/uL    Eos (Absolute) 0.08 0.00 - 0.51 K/uL    Baso (Absolute) 0.05 0.00 - 0.12 K/uL    Immature Granulocytes (abs) 0.04 0.00 - 0.11 K/uL    NRBC (Absolute) 0.00 K/uL   Comp Metabolic Panel   Result Value Ref Range    Sodium 139 135 - 145 mmol/L    Potassium 4.6 3.6 - 5.5 mmol/L    Chloride 103 96 - 112 mmol/L    Co2 27 20 - 33 mmol/L    Anion Gap 9.0 0.0 - 11.9    Glucose 146 (H) 65 - 99 mg/dL    Bun 10 8 - 22 mg/dL    Creatinine 0.70 0.50 - 1.40 mg/dL    Calcium 9.7 8.4 - 10.2 mg/dL    AST(SGOT) 18 12 - 45 U/L    ALT(SGPT) 41 2 - 50 U/L    Alkaline Phosphatase 95 30 - 99 U/L    Total Bilirubin 0.3 0.1 - 1.5 mg/dL    Albumin 4.3 3.2 - 4.9 g/dL    Total Protein 7.4 6.0 - 8.2 g/dL    Globulin 3.1 1.9 - 3.5 g/dL    A-G Ratio 1.4 g/dL   [facility] SED RATE   Result Value Ref Range    Sed Rate  "Westergren 12 0 - 15 mm/hour   CRP HIGH SENSITIVE (CARDIAC)   Result Value Ref Range    C Reactive Protein High Sensitive 2.6 0.0 - 7.5 mg/L   ESTIMATED GFR   Result Value Ref Range    GFR If African American >60 >60 mL/min/1.73 m 2    GFR If Non African American >60 >60 mL/min/1.73 m 2       \    COURSE & MEDICAL DECISION MAKING  Nursing notes and vital signs were reviewed. (See chart for details)  The patients  records were reviewed, history was obtained from the patient ;     The patient presents with toe swelling and erythema, and the differential diagnosis includes but is not limited to toe infection, patient is diabetic, deeper ulcer or osteo a possibility although less likely with only one day of pain.       Initial orders in the Emergency Department included cbc, crp, cmp, sed and initial treatment in the Emergency Department included opening lesion, sending for culture and dressing    ED testing reveals no elevated WBC, CRP, xray shows no bone involvement.    On repeat evaluation of the patient, there was a  good  response to medications, /92   Pulse 85   Temp 37 °C (98.6 °F) (Temporal)   Resp 16   Ht 1.854 m (6' 1\")   Wt (!) 159.8 kg (352 lb 4.7 oz)   SpO2 96%   BMI 46.48 kg/m²     Plan at this time is culture, treatment with Augmentin and Bactrim he is agreeable to have wound checked on Monday at [facility] and sooner here if increased redness, fever or worsening.        FINAL IMPRESSION  1. Toe infection  2.        DISPOSITION  Home in stable condition        FOLLOW UP:    wound check in 2-3 days, sooner if increased redness or fever          OUTPATIENT MEDICATIONS:  Discharge Medication List as of 9/13/2019 10:38 AM      START taking these medications    Details   amoxicillin-clavulanate (AUGMENTIN) 875-125 MG Tab Take 1 Tab by mouth 2 times a day for 7 days., Disp-14 Tab, R-0, Print Rx Paper      sulfamethoxazole-trimethoprim (BACTRIM DS) 800-160 MG tablet Take 1 Tab by mouth 2 times a day for 7 " days., Disp-14 Tab, R-0, Print Rx Paper               The patient was discharged home with an information sheet on toe infection and told to return immediately for any signs or symptoms listed, but specifically if fever, increased redness, or any worsening at all.  The patient verbally agreed to the discharge precautions and follow-up plan which is documented in EPIC.    Electronically signed by: Kathi Gomez, 9/13/2019 9:47 AM

## 2019-09-13 NOTE — ED NOTES
PIV dc'd and pt released with 2 RX and all follow-up, pt stated he feels better after the wound was I and d. Verbalized understanding instructions and is looking for a PCP.

## 2019-09-13 NOTE — LETTER
9/17/2019               Luis Alfredo Javed  [address] [place] [address]        Dear Luis Alfredo (MR#[id number])    This letter is sent in regards to your, recent visit to the [facility] Emergency Department on 9/13/2019.  During the visit, tests were performed to assist the physician in a medical diagnosis.  A review of those tests requires that we notify you of the following:    Your wound culture was POSITIVE for a bacteria called Methicillin Resistant Staphylococcus aureus (MRSA). The antibiotic prescribed for you Bactrim (sulfamethoxazole-trimethoprim) should be active to treat this bacteria. Continue taking bactrim. Stop taking Augmentin (amoxicillin/clavulanic acid). IT IS IMPORTANT THAT YOU CONTINUE TAKING BACTRIM (sulfamethoxazle/trimethoprim) YOUR ANTIBIOTIC UNTIL IT IS FINISHED.      Please feel free to contact me at the number below if you have any questions or concerns. Thank you for your cooperation in the matter.    Sincerely,  ED Culture Follow-Up Staff  Dorota Hurst, PharmD    [facility], Emergency Department  [address] [place] [address]  [phone] (ED Culture Line)  [phone]

## 2019-09-14 LAB
GRAM STN SPEC: NORMAL
SIGNIFICANT IND 70042: NORMAL
SITE SITE: NORMAL
SOURCE SOURCE: NORMAL

## 2019-09-15 LAB
BACTERIA WND AEROBE CULT: ABNORMAL
BACTERIA WND AEROBE CULT: ABNORMAL
GRAM STN SPEC: ABNORMAL
SIGNIFICANT IND 70042: ABNORMAL
SITE SITE: ABNORMAL
SOURCE SOURCE: ABNORMAL

## 2019-09-17 NOTE — ED NOTES
"ED Positive Culture Follow-up/Notification Note:    Date: 9/17/19     Patient seen in the ED on 9/13/2019 for right foot 3rd toe wound with redness, swelling  1. Toe infection       Discharge Medication List as of 9/13/2019 10:38 AM      START taking these medications    Details   amoxicillin-clavulanate (AUGMENTIN) 875-125 MG Tab Take 1 Tab by mouth 2 times a day for 7 days., Disp-14 Tab, R-0, Print Rx Paper      sulfamethoxazole-trimethoprim (BACTRIM DS) 800-160 MG tablet Take 1 Tab by mouth 2 times a day for 7 days., Disp-14 Tab, R-0, Print Rx Paper             Allergies: Patient has no known allergies.     Vitals:    09/13/19 0853 09/13/19 1042   BP: 141/97 137/92   Pulse: 98 85   Resp: 18 16   Temp: 36.4 °C (97.6 °F) 37 °C (98.6 °F)   TempSrc: Temporal Temporal   SpO2: 96%    Weight: (!) 159.8 kg (352 lb 4.7 oz)    Height: 1.854 m (6' 1\")        Final cultures:   Results     Procedure Component Value Units Date/Time    CULTURE WOUND W/ GRAM STAIN [[id number]]  (Abnormal)  (Susceptibility) Collected:  09/13/19 1033    Order Status:  Completed Specimen:  Wound from Right Foot Updated:  09/15/19 1034     Significant Indicator POS     Source WND     Site RIGHT FOOT     Culture Result -     Gram Stain Result Rare WBCs.  Rare Gram positive cocci.       Culture Result Methicillin Resistant Staphylococcus aureus  Light growth      Narrative:       CALL  [place]  [facility] tel. [phone],  CALLED  [facility] tel. [phone] 09/15/2019, 10:34, RB PERF. RESULTS CALLED  TO:[id number] LM    Susceptibility     Methicillin resistant staphylococcus aureus (1)     Antibiotic Interpretation Microscan Method Status    Clindamycin Sensitive <=0.5 mcg/mL NAYANA Final    Daptomycin Sensitive <=0.5 mcg/mL NAYANA Final    Erythromycin Resistant >4 mcg/mL NAYANA Final    Moxifloxacin Sensitive 2 mcg/mL NAYANA Final    Ampicillin/sulbactam Resistant <=8/4 mcg/mL NAYANA Final    Oxacillin Resistant >2 mcg/mL NAYANA Final    Vancomycin Sensitive 1 mcg/mL NAYANA Final    " Penicillin Resistant >8 mcg/mL NAYANA Final    Trimeth/Sulfa Sensitive <=0.5/9.5 mcg/mL NAYANA Final    Tetracycline Resistant >8 mcg/mL NAYANA Final                   GRAM STAIN [[id number]] Collected:  09/13/19 1033    Order Status:  Completed Specimen:  Wound Updated:  09/14/19 0129     Significant Indicator .     Source WND     Site RIGHT FOOT     Gram Stain Result Rare WBCs.  Rare Gram positive cocci.            Plan:   Called patient on 9/17/19 at 1425 he picked up the phone and confirmed name and birthday.  Two minutes into the conversation after introducing myself and asking him how his toe swelling and redness was the phone call was disconnected (or he hung up on me). Attempted to call the patient back but it went to voice mail and the voice mailbox was full so I was unable to leave a message.    Would culture MRSA sensitive to bactrim   Sent letter to patient to notify of positive culture result and instructed him to stop taking Augmentin and continue taking bactrim and encourage compliance with prescribed antibiotics.     Dorota Hurst, Pharm.D., BCPS

## 2020-07-08 ENCOUNTER — HOSPITAL ENCOUNTER (EMERGENCY)
Facility: MEDICAL CENTER | Age: 40
End: 2020-07-08
Attending: EMERGENCY MEDICINE | Admitting: EMERGENCY MEDICINE
Payer: MEDICAID

## 2020-07-08 ENCOUNTER — APPOINTMENT (OUTPATIENT)
Dept: RADIOLOGY | Facility: MEDICAL CENTER | Age: 40
End: 2020-07-08
Attending: EMERGENCY MEDICINE
Payer: MEDICAID

## 2020-07-08 VITALS
SYSTOLIC BLOOD PRESSURE: 124 MMHG | RESPIRATION RATE: 16 BRPM | OXYGEN SATURATION: 98 % | DIASTOLIC BLOOD PRESSURE: 65 MMHG | HEIGHT: 74 IN | BODY MASS INDEX: 40.43 KG/M2 | TEMPERATURE: 98 F | WEIGHT: 315 LBS | HEART RATE: 93 BPM

## 2020-07-08 PROCEDURE — 700102 HCHG RX REV CODE 250 W/ 637 OVERRIDE(OP): Performed by: EMERGENCY MEDICINE

## 2020-07-08 PROCEDURE — 302449 STATCHG TRIAGE ONLY (STATISTIC)

## 2020-07-08 PROCEDURE — A9270 NON-COVERED ITEM OR SERVICE: HCPCS | Performed by: EMERGENCY MEDICINE

## 2020-07-08 RX ORDER — OXYCODONE HYDROCHLORIDE AND ACETAMINOPHEN 5; 325 MG/1; MG/1
1 TABLET ORAL ONCE
Status: COMPLETED | OUTPATIENT
Start: 2020-07-08 | End: 2020-07-08

## 2020-07-08 RX ORDER — IBUPROFEN 600 MG/1
600 TABLET ORAL ONCE
Status: COMPLETED | OUTPATIENT
Start: 2020-07-08 | End: 2020-07-08

## 2020-07-08 RX ADMIN — IBUPROFEN 600 MG: 600 TABLET ORAL at 12:39

## 2020-07-08 RX ADMIN — OXYCODONE HYDROCHLORIDE AND ACETAMINOPHEN 1 TABLET: 5; 325 TABLET ORAL at 12:39

## 2020-07-08 ASSESSMENT — FIBROSIS 4 INDEX: FIB4 SCORE: 0.58

## 2020-07-08 NOTE — ED NOTES
Pt not in room when x-ray arrived. After speaking with staff, triage RN saw pt walk out of the building.
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
## 2020-07-11 ENCOUNTER — APPOINTMENT (OUTPATIENT)
Dept: RADIOLOGY | Facility: MEDICAL CENTER | Age: 40
End: 2020-07-11
Attending: EMERGENCY MEDICINE
Payer: MEDICAID

## 2020-07-11 ENCOUNTER — HOSPITAL ENCOUNTER (EMERGENCY)
Facility: MEDICAL CENTER | Age: 40
End: 2020-07-11
Attending: EMERGENCY MEDICINE
Payer: MEDICAID

## 2020-07-11 VITALS
BODY MASS INDEX: 40.43 KG/M2 | OXYGEN SATURATION: 96 % | DIASTOLIC BLOOD PRESSURE: 56 MMHG | WEIGHT: 315 LBS | RESPIRATION RATE: 20 BRPM | SYSTOLIC BLOOD PRESSURE: 104 MMHG | HEIGHT: 74 IN | HEART RATE: 86 BPM | TEMPERATURE: 96.9 F

## 2020-07-11 DIAGNOSIS — S22.42XA CLOSED FRACTURE OF MULTIPLE RIBS OF LEFT SIDE, INITIAL ENCOUNTER: ICD-10-CM

## 2020-07-11 DIAGNOSIS — S20.212A CONTUSION OF LEFT CHEST WALL, INITIAL ENCOUNTER: ICD-10-CM

## 2020-07-11 LAB
ALBUMIN SERPL BCP-MCNC: 4.1 G/DL (ref 3.2–4.9)
ALBUMIN/GLOB SERPL: 1.4 G/DL
ALP SERPL-CCNC: 116 U/L (ref 30–99)
ALT SERPL-CCNC: 92 U/L (ref 2–50)
ANION GAP SERPL CALC-SCNC: 10 MMOL/L (ref 7–16)
APTT PPP: 25.4 SEC (ref 24.7–36)
AST SERPL-CCNC: 43 U/L (ref 12–45)
BASOPHILS # BLD AUTO: 0.8 % (ref 0–1.8)
BASOPHILS # BLD: 0.06 K/UL (ref 0–0.12)
BILIRUB SERPL-MCNC: 0.3 MG/DL (ref 0.1–1.5)
BUN SERPL-MCNC: 14 MG/DL (ref 8–22)
CALCIUM SERPL-MCNC: 9.7 MG/DL (ref 8.4–10.2)
CHLORIDE SERPL-SCNC: 99 MMOL/L (ref 96–112)
CO2 SERPL-SCNC: 23 MMOL/L (ref 20–33)
CREAT SERPL-MCNC: 0.83 MG/DL (ref 0.5–1.4)
D DIMER PPP IA.FEU-MCNC: 1.12 UG/ML (FEU) (ref 0–0.5)
EKG IMPRESSION: NORMAL
EOSINOPHIL # BLD AUTO: 0.02 K/UL (ref 0–0.51)
EOSINOPHIL NFR BLD: 0.3 % (ref 0–6.9)
ERYTHROCYTE [DISTWIDTH] IN BLOOD BY AUTOMATED COUNT: 43.9 FL (ref 35.9–50)
GLOBULIN SER CALC-MCNC: 3 G/DL (ref 1.9–3.5)
GLUCOSE SERPL-MCNC: 272 MG/DL (ref 65–99)
HCT VFR BLD AUTO: 45.6 % (ref 42–52)
HGB BLD-MCNC: 15.2 G/DL (ref 14–18)
IMM GRANULOCYTES # BLD AUTO: 0.04 K/UL (ref 0–0.11)
IMM GRANULOCYTES NFR BLD AUTO: 0.5 % (ref 0–0.9)
INR PPP: 0.96 (ref 0.87–1.13)
LYMPHOCYTES # BLD AUTO: 2.31 K/UL (ref 1–4.8)
LYMPHOCYTES NFR BLD: 29.2 % (ref 22–41)
MCH RBC QN AUTO: 31.3 PG (ref 27–33)
MCHC RBC AUTO-ENTMCNC: 33.3 G/DL (ref 33.7–35.3)
MCV RBC AUTO: 94 FL (ref 81.4–97.8)
MONOCYTES # BLD AUTO: 0.56 K/UL (ref 0–0.85)
MONOCYTES NFR BLD AUTO: 7.1 % (ref 0–13.4)
NEUTROPHILS # BLD AUTO: 4.91 K/UL (ref 1.82–7.42)
NEUTROPHILS NFR BLD: 62.1 % (ref 44–72)
NRBC # BLD AUTO: 0 K/UL
NRBC BLD-RTO: 0 /100 WBC
PLATELET # BLD AUTO: 184 K/UL (ref 164–446)
PMV BLD AUTO: 10.6 FL (ref 9–12.9)
POTASSIUM SERPL-SCNC: 4.9 MMOL/L (ref 3.6–5.5)
PROT SERPL-MCNC: 7.1 G/DL (ref 6–8.2)
PROTHROMBIN TIME: 12.9 SEC (ref 12–14.6)
RBC # BLD AUTO: 4.85 M/UL (ref 4.7–6.1)
SODIUM SERPL-SCNC: 132 MMOL/L (ref 135–145)
TROPONIN T SERPL-MCNC: 10 NG/L (ref 6–19)
WBC # BLD AUTO: 7.9 K/UL (ref 4.8–10.8)

## 2020-07-11 PROCEDURE — 80053 COMPREHEN METABOLIC PANEL: CPT

## 2020-07-11 PROCEDURE — 85025 COMPLETE CBC W/AUTO DIFF WBC: CPT

## 2020-07-11 PROCEDURE — 85379 FIBRIN DEGRADATION QUANT: CPT

## 2020-07-11 PROCEDURE — 99284 EMERGENCY DEPT VISIT MOD MDM: CPT

## 2020-07-11 PROCEDURE — 71101 X-RAY EXAM UNILAT RIBS/CHEST: CPT | Mod: LT

## 2020-07-11 PROCEDURE — 93005 ELECTROCARDIOGRAM TRACING: CPT | Performed by: EMERGENCY MEDICINE

## 2020-07-11 PROCEDURE — 94760 N-INVAS EAR/PLS OXIMETRY 1: CPT

## 2020-07-11 PROCEDURE — 96374 THER/PROPH/DIAG INJ IV PUSH: CPT | Mod: XU

## 2020-07-11 PROCEDURE — 700111 HCHG RX REV CODE 636 W/ 250 OVERRIDE (IP): Performed by: EMERGENCY MEDICINE

## 2020-07-11 PROCEDURE — 71275 CT ANGIOGRAPHY CHEST: CPT

## 2020-07-11 PROCEDURE — 700117 HCHG RX CONTRAST REV CODE 255: Performed by: EMERGENCY MEDICINE

## 2020-07-11 PROCEDURE — 84484 ASSAY OF TROPONIN QUANT: CPT

## 2020-07-11 PROCEDURE — 85730 THROMBOPLASTIN TIME PARTIAL: CPT

## 2020-07-11 PROCEDURE — 36415 COLL VENOUS BLD VENIPUNCTURE: CPT

## 2020-07-11 PROCEDURE — 85610 PROTHROMBIN TIME: CPT

## 2020-07-11 RX ORDER — SULFAMETHOXAZOLE AND TRIMETHOPRIM 800; 160 MG/1; MG/1
1 TABLET ORAL 2 TIMES DAILY
Status: SHIPPED | COMMUNITY
End: 2021-10-06

## 2020-07-11 RX ORDER — LIRAGLUTIDE 6 MG/ML
0.6 INJECTION SUBCUTANEOUS DAILY
Status: SHIPPED | COMMUNITY
End: 2022-03-29 | Stop reason: SDUPTHER

## 2020-07-11 RX ORDER — METHOCARBAMOL 500 MG/1
500 TABLET, FILM COATED ORAL EVERY 6 HOURS PRN
Qty: 10 TAB | Refills: 3 | Status: SHIPPED | OUTPATIENT
Start: 2020-07-11 | End: 2020-07-16

## 2020-07-11 RX ORDER — KETOROLAC TROMETHAMINE 30 MG/ML
30 INJECTION, SOLUTION INTRAMUSCULAR; INTRAVENOUS ONCE
Status: COMPLETED | OUTPATIENT
Start: 2020-07-11 | End: 2020-07-11

## 2020-07-11 RX ORDER — IBUPROFEN 600 MG/1
600 TABLET ORAL EVERY 6 HOURS PRN
Qty: 20 TAB | Refills: 0 | Status: SHIPPED | OUTPATIENT
Start: 2020-07-11 | End: 2021-10-06

## 2020-07-11 RX ORDER — OMEPRAZOLE 20 MG/1
20 CAPSULE, DELAYED RELEASE ORAL DAILY
Status: SHIPPED | COMMUNITY
Start: 2020-05-22 | End: 2021-10-06

## 2020-07-11 RX ORDER — AMOXICILLIN AND CLAVULANATE POTASSIUM 875; 125 MG/1; MG/1
1 TABLET, FILM COATED ORAL 2 TIMES DAILY
Status: SHIPPED | COMMUNITY
End: 2021-10-06

## 2020-07-11 RX ADMIN — IOHEXOL 100 ML: 350 INJECTION, SOLUTION INTRAVENOUS at 11:08

## 2020-07-11 RX ADMIN — KETOROLAC TROMETHAMINE 30 MG: 30 INJECTION, SOLUTION INTRAMUSCULAR at 09:05

## 2020-07-11 ASSESSMENT — FIBROSIS 4 INDEX: FIB4 SCORE: 0.58

## 2020-07-11 NOTE — ED NOTES
IS given to pt and instructed on use.  Discharge instructions and Rx (x2) given to pt with verbalized understanding.  Pt ambulatory out of the ER at this time.
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
## 2020-07-11 NOTE — ED NOTES
Med rec completed per pt   Allergies reviewed    Pt was started on a 10 day course of Augmentin and Bactrim DS that was completed this morning, but pt's doctor added another 5 days to both of these antibiotics (15 day course total)     Pt takes Victoza and is currently using 0.6 mg daily, pt states that he is suppose to up his doses to 1.2 mg tomorrow 7/12/2020

## 2020-07-11 NOTE — DISCHARGE INSTRUCTIONS
Follow-up with primary care next week for reevaluation, to establish care, for medication management and close blood pressure monitoring.    CT scan did demonstrate left fourth and fifth lateral rib fractures.    Motrin every 6 hours as needed for discomfort.  Robaxin every 6 hours and as needed for discomfort or spasm.    Incentive spirometry, 10 times per hour while awake, as directed.    Activity as tolerated otherwise.    Return the emergency department for persistent worsening chest pain, shortness of breath, syncope, cough, fever or other new concerns.

## 2020-07-11 NOTE — ED PROVIDER NOTES
ED Provider Note    CHIEF COMPLAINT  Chief Complaint   Patient presents with   • Rib Injury   • Chest Wall Pain       HPI  Luis Alfredo Casimiro Javed is a 40 y.o. male who ambulates to the emergency department through triage for chest pain.  Patient states he was going down a water slide on Monday when he went around a corner and felt a pull in his chest.  Said persistent left lateral chest pain since that time, however now with increasing chest pain, radiating to the upper left chest and substernal region.  Pain is not worse when rolling over or with arm movement, it is worse with deep inspiration.  Patient has difficulty reproducing the pain with palpation but he believes it is a muscle.  No shortness of breath, palpitations or syncope.  No fever chills.  No cough, sputum production or congestion.    Tylenol without relief.  Marijuana with some improvement.    Hypertension, diabetes, compliant with home medications.  Patient continues to smoke tobacco.  No previous cardiac evaluation.     Patient states he came a few days ago for evaluation, however did not wait for x-rays as recommended due to weight times and crowding.    REVIEW OF SYSTEMS  See HPI for further details. All other systems are negative.     PAST MEDICAL HISTORY   has a past medical history of Chest pain, Hypertension, and Type II or unspecified type diabetes mellitus without mention of complication, not stated as uncontrolled.    SOCIAL HISTORY  Social History     Tobacco Use   • Smoking status: Current Every Day Smoker     Packs/day: 1.50     Years: 21.00     Pack years: 31.50     Types: Cigarettes   • Smokeless tobacco: Never Used   Substance and Sexual Activity   • Alcohol use: No     Alcohol/week: 0.0 oz   • Drug use: No     Comment: 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   • Sexual activity: Not on file       SURGICAL HISTORY   has a past surgical history that includes hernia repair (age 4) and strabismus repair (age 5).    CURRENT MEDICATIONS  Home  "Medications     Reviewed by Keshia Suazo PhT (Pharmacy Tech) on 07/11/20 at 0831  Med List Status: Complete   Medication Last Dose Status   amoxicillin-clavulanate (AUGMENTIN) 875-125 MG Tab 7/11/2020 Active   aspirin EC (ECOTRIN) 81 MG TBEC > 1 WEEK AGO Active   gabapentin (NEURONTIN) 300 MG Cap 7/11/2020 Active   liraglutide (VICTOZA) 18 MG/3ML Solution Pen-injector 7/11/2020 Active   lisinopril (PRINIVIL) 10 MG Tab 7/11/2020 Active   metFORMIN (GLUCOPHAGE) 500 MG Tab 7/11/2020 Active   metoprolol SR (TOPROL XL) 50 MG TABLET SR 24 HR 7/11/2020 Active   omeprazole (PRILOSEC) 20 MG delayed-release capsule 7/11/2020 Active   pioglitazone (ACTOS) 45 MG Tab 7/10/2020 Active   sulfamethoxazole-trimethoprim (BACTRIM DS) 800-160 MG tablet 7/11/2020 Active                ALLERGIES  No Known Allergies    PHYSICAL EXAM  VITAL SIGNS: /56   Pulse 86   Temp 36.1 °C (96.9 °F) (Temporal)   Resp 20   Ht 1.88 m (6' 2\")   Wt (!) 152.3 kg (335 lb 12.2 oz)   SpO2 96%   BMI 43.11 kg/m²   Pulse ox interpretation: I interpret this pulse ox as normal.  Constitutional: Alert in no apparent distress.  Obese.  HENT: Normocephalic, atraumatic. Bilateral external ears normal, Nose normal. Moist mucous membranes.    Eyes: Pupils are equal and reactive, Conjunctiva normal.   Neck: Normal range of motion without pain or resistance, Supple.  No JVD..   Lymphatic: No lymphadenopathy noted.   Cardiovascular: Regular rate and rhythm, no murmurs. Distal pulses intact.  No peripheral edema.  Thorax & Lungs: Normal breath sounds.  No wheezing/rales/ronchi. No increased work of breathing, clipped speech or retractions.  No reproducible discomfort with palpation at rib angles or intercostal regions.  No chest wall or cutaneous hematoma, ecchymosis or crepitus.  Abdomen: Obese, soft, non-distended, non-tender to palpation. No palpable or pulsatile masses. No peritoneal signs.   Skin: Warm, Dry, No erythema, No rash.   Musculoskeletal: Good " range of motion in all major joints.   Neurologic: Alert and oriented x4.  Ambulates independently.  Psychiatric: Affect normal, Judgment normal, Mood normal.       DIAGNOSTIC STUDIES / PROCEDURES    LABS  Results for orders placed or performed during the hospital encounter of 07/11/20   CBC w/ Differential   Result Value Ref Range    WBC 7.9 4.8 - 10.8 K/uL    RBC 4.85 4.70 - 6.10 M/uL    Hemoglobin 15.2 14.0 - 18.0 g/dL    Hematocrit 45.6 42.0 - 52.0 %    MCV 94.0 81.4 - 97.8 fL    MCH 31.3 27.0 - 33.0 pg    MCHC 33.3 (L) 33.7 - 35.3 g/dL    RDW 43.9 35.9 - 50.0 fL    Platelet Count 184 164 - 446 K/uL    MPV 10.6 9.0 - 12.9 fL    Neutrophils-Polys 62.10 44.00 - 72.00 %    Lymphocytes 29.20 22.00 - 41.00 %    Monocytes 7.10 0.00 - 13.40 %    Eosinophils 0.30 0.00 - 6.90 %    Basophils 0.80 0.00 - 1.80 %    Immature Granulocytes 0.50 0.00 - 0.90 %    Nucleated RBC 0.00 /100 WBC    Neutrophils (Absolute) 4.91 1.82 - 7.42 K/uL    Lymphs (Absolute) 2.31 1.00 - 4.80 K/uL    Monos (Absolute) 0.56 0.00 - 0.85 K/uL    Eos (Absolute) 0.02 0.00 - 0.51 K/uL    Baso (Absolute) 0.06 0.00 - 0.12 K/uL    Immature Granulocytes (abs) 0.04 0.00 - 0.11 K/uL    NRBC (Absolute) 0.00 K/uL   Complete Metabolic Panel (CMP)   Result Value Ref Range    Sodium 132 (L) 135 - 145 mmol/L    Potassium 4.9 3.6 - 5.5 mmol/L    Chloride 99 96 - 112 mmol/L    Co2 23 20 - 33 mmol/L    Anion Gap 10.0 7.0 - 16.0    Glucose 272 (H) 65 - 99 mg/dL    Bun 14 8 - 22 mg/dL    Creatinine 0.83 0.50 - 1.40 mg/dL    Calcium 9.7 8.4 - 10.2 mg/dL    AST(SGOT) 43 12 - 45 U/L    ALT(SGPT) 92 (H) 2 - 50 U/L    Alkaline Phosphatase 116 (H) 30 - 99 U/L    Total Bilirubin 0.3 0.1 - 1.5 mg/dL    Albumin 4.1 3.2 - 4.9 g/dL    Total Protein 7.1 6.0 - 8.2 g/dL    Globulin 3.0 1.9 - 3.5 g/dL    A-G Ratio 1.4 g/dL   Troponin STAT   Result Value Ref Range    Troponin T 10 6 - 19 ng/L   D-Dimer (only helpful in low pre-test probability wells critieria. Do not order if patient  ruled out by PERC criteria. See Weblinks at top of Labs section)   Result Value Ref Range    D-Dimer Screen 1.12 (H) 0.00 - 0.50 ug/mL (FEU)   APTT   Result Value Ref Range    APTT 25.4 24.7 - 36.0 sec   PT/INR   Result Value Ref Range    PT 12.9 12.0 - 14.6 sec    INR 0.96 0.87 - 1.13   ESTIMATED GFR   Result Value Ref Range    GFR If African American >60 >60 mL/min/1.73 m 2    GFR If Non African American >60 >60 mL/min/1.73 m 2   EKG   Result Value Ref Range    Report       [facility] Emergency Dept.    Test Date:  2020  Pt Name:    VERN DOUGLASS                    Department: [facility]  MRN:        [id number]                      [facility]  Gender:     Male                         Technician: DIANA  :        1980                   Requested By:LUCIA BARBOSA  Order #:    [id number]                    Reading MD: LUCIA BARBOSA DO    Measurements  Intervals                                Axis  Rate:       86                           P:          14  OH:         160                          QRS:        92  QRSD:       102                          T:          35  QT:         340  QTc:        407    Interpretive Statements  SINUS RHYTHM  BORDERLINE RIGHT AXIS DEVIATION  BORDERLINE INFERIOR Q WAVES  Compared to ECG 2017 09:59:36  No significant changes  Electronically Signed On 2020 13:30:36 PDT by LUCIA BARBOSA DO       RADIOLOGY  CT-CTA CHEST PULMONARY ARTERY W/ RECONS   Final Result      1.  Left 5th and 6th lateral rib fracture      2.  Negative for pulmonary embolism      3.  Hepatic steatosis, severe            [id number] (WITH 1-VIEW CXR) LEFT   Final Result      1.  No rib fracture identified      2.  No pneumothorax          COURSE & MEDICAL DECISION MAKING  Nursing notes and vital signs were reviewed. (See chart for details)  The patients records were reviewed, history was obtained from the patient;     Seen and evaluated at bedside.  He  describes history of strain pattern or twisting going down a water slide causing left-sided chest pain.  However pain is persistent, worse with inspiration and not reproducible.  Will do rib x-ray, add cardiac studies, d-dimer as well given risk factors.  Heart score 3  but with history of hypertension, diabetes, obesity and tobacco use.    No radiographic evidence for fracture, pneumothorax.  No other acute pathology.  EKG within normal limits, no STEMI or ischemia.  Continues telemetry without ectopy or arrhythmia.  Labs are quite unrevealing, troponin normal after multiple days of chest pain, however d-dimer is elevated.  Will add CT chest to exclude PE.    CT chest without evidence for PE, however patient does have left fifth and sixth lateral rib fractures, this is somewhat consistent with his story and pain pattern.  Discomfort well controlled with Toradol in the emergency department.  Hemodynamically stable without tachycardia, hypotension or hypoxia.    Patient is stable for discharge at this time, anticipatory guidance provided, Motrin and Robaxin for discomfort, close follow-up is encouraged, and strict ED return instructions have been detailed. Patient is agreeable to the disposition and plan.    Patient's blood pressure was elevated in the emergency department, and has been referred to primary care for close monitoring.    FINAL IMPRESSION  (S22.42XA) Closed fracture of multiple ribs of left side, initial encounter  (S20.212A) Contusion of left chest wall, initial encounter      Electronically signed by: Nadine Rothman D.O., 7/11/2020 8:50 AM      This dictation was created using voice recognition software. The accuracy of the dictation is limited to the abilities of the software. I expect there may be some errors of grammar and possibly content. The nursing notes were reviewed and certain aspects of this information were incorporated into this note.

## 2020-08-01 RX ORDER — LISINOPRIL 10 MG/1
TABLET ORAL
Qty: 30 TAB | Refills: 11 | OUTPATIENT
Start: 2020-08-01

## 2021-02-23 ENCOUNTER — HOSPITAL ENCOUNTER (EMERGENCY)
Facility: MEDICAL CENTER | Age: 41
End: 2021-02-23
Attending: EMERGENCY MEDICINE
Payer: MEDICAID

## 2021-02-23 ENCOUNTER — APPOINTMENT (OUTPATIENT)
Dept: RADIOLOGY | Facility: MEDICAL CENTER | Age: 41
End: 2021-02-23
Attending: EMERGENCY MEDICINE
Payer: MEDICAID

## 2021-02-23 VITALS
HEART RATE: 87 BPM | OXYGEN SATURATION: 95 % | HEIGHT: 74 IN | WEIGHT: 315 LBS | BODY MASS INDEX: 40.43 KG/M2 | SYSTOLIC BLOOD PRESSURE: 118 MMHG | RESPIRATION RATE: 18 BRPM | TEMPERATURE: 97.6 F | DIASTOLIC BLOOD PRESSURE: 84 MMHG

## 2021-02-23 DIAGNOSIS — L03.116 CELLULITIS OF LEFT LOWER EXTREMITY: ICD-10-CM

## 2021-02-23 DIAGNOSIS — E11.621 DIABETIC ULCER OF TOE OF LEFT FOOT ASSOCIATED WITH TYPE 2 DIABETES MELLITUS, LIMITED TO BREAKDOWN OF SKIN (HCC): ICD-10-CM

## 2021-02-23 DIAGNOSIS — L97.521 DIABETIC ULCER OF TOE OF LEFT FOOT ASSOCIATED WITH TYPE 2 DIABETES MELLITUS, LIMITED TO BREAKDOWN OF SKIN (HCC): ICD-10-CM

## 2021-02-23 DIAGNOSIS — I10 ESSENTIAL HYPERTENSION: ICD-10-CM

## 2021-02-23 DIAGNOSIS — E66.01 MORBID OBESITY (HCC): ICD-10-CM

## 2021-02-23 DIAGNOSIS — E11.65 TYPE 2 DIABETES MELLITUS WITH HYPERGLYCEMIA, WITHOUT LONG-TERM CURRENT USE OF INSULIN (HCC): ICD-10-CM

## 2021-02-23 LAB
ALBUMIN SERPL BCP-MCNC: 4.4 G/DL (ref 3.2–4.9)
ALBUMIN/GLOB SERPL: 1.5 G/DL
ALP SERPL-CCNC: 132 U/L (ref 30–99)
ALT SERPL-CCNC: 47 U/L (ref 2–50)
ANION GAP SERPL CALC-SCNC: 12 MMOL/L (ref 7–16)
AST SERPL-CCNC: 24 U/L (ref 12–45)
BASOPHILS # BLD AUTO: 0.6 % (ref 0–1.8)
BASOPHILS # BLD: 0.06 K/UL (ref 0–0.12)
BILIRUB SERPL-MCNC: 0.5 MG/DL (ref 0.1–1.5)
BUN SERPL-MCNC: 14 MG/DL (ref 8–22)
CALCIUM SERPL-MCNC: 9.5 MG/DL (ref 8.4–10.2)
CHLORIDE SERPL-SCNC: 102 MMOL/L (ref 96–112)
CO2 SERPL-SCNC: 22 MMOL/L (ref 20–33)
CREAT SERPL-MCNC: 0.78 MG/DL (ref 0.5–1.4)
CRP SERPL HS-MCNC: 0.36 MG/DL (ref 0–0.75)
EOSINOPHIL # BLD AUTO: 0 K/UL (ref 0–0.51)
EOSINOPHIL NFR BLD: 0 % (ref 0–6.9)
ERYTHROCYTE [DISTWIDTH] IN BLOOD BY AUTOMATED COUNT: 42.9 FL (ref 35.9–50)
ERYTHROCYTE [SEDIMENTATION RATE] IN BLOOD BY WESTERGREN METHOD: 5 MM/HOUR (ref 0–15)
GLOBULIN SER CALC-MCNC: 3 G/DL (ref 1.9–3.5)
GLUCOSE SERPL-MCNC: 182 MG/DL (ref 65–99)
HCT VFR BLD AUTO: 47 % (ref 42–52)
HGB BLD-MCNC: 16.2 G/DL (ref 14–18)
IMM GRANULOCYTES # BLD AUTO: 0.05 K/UL (ref 0–0.11)
IMM GRANULOCYTES NFR BLD AUTO: 0.5 % (ref 0–0.9)
INR PPP: 0.95 (ref 0.87–1.13)
LACTATE BLD-SCNC: 2.5 MMOL/L (ref 0.5–2)
LACTATE BLD-SCNC: 2.6 MMOL/L (ref 0.5–2)
LYMPHOCYTES # BLD AUTO: 2.97 K/UL (ref 1–4.8)
LYMPHOCYTES NFR BLD: 28.4 % (ref 22–41)
MCH RBC QN AUTO: 31.8 PG (ref 27–33)
MCHC RBC AUTO-ENTMCNC: 34.5 G/DL (ref 33.7–35.3)
MCV RBC AUTO: 92.3 FL (ref 81.4–97.8)
MONOCYTES # BLD AUTO: 0.68 K/UL (ref 0–0.85)
MONOCYTES NFR BLD AUTO: 6.5 % (ref 0–13.4)
NEUTROPHILS # BLD AUTO: 6.68 K/UL (ref 1.82–7.42)
NEUTROPHILS NFR BLD: 64 % (ref 44–72)
NRBC # BLD AUTO: 0 K/UL
NRBC BLD-RTO: 0 /100 WBC
PLATELET # BLD AUTO: 190 K/UL (ref 164–446)
PMV BLD AUTO: 10.6 FL (ref 9–12.9)
POTASSIUM SERPL-SCNC: 4.4 MMOL/L (ref 3.6–5.5)
PROT SERPL-MCNC: 7.4 G/DL (ref 6–8.2)
PROTHROMBIN TIME: 12.4 SEC (ref 12–14.6)
RBC # BLD AUTO: 5.09 M/UL (ref 4.7–6.1)
SODIUM SERPL-SCNC: 136 MMOL/L (ref 135–145)
WBC # BLD AUTO: 10.4 K/UL (ref 4.8–10.8)

## 2021-02-23 PROCEDURE — 71045 X-RAY EXAM CHEST 1 VIEW: CPT

## 2021-02-23 PROCEDURE — 83605 ASSAY OF LACTIC ACID: CPT | Mod: 91

## 2021-02-23 PROCEDURE — 36415 COLL VENOUS BLD VENIPUNCTURE: CPT

## 2021-02-23 PROCEDURE — 99284 EMERGENCY DEPT VISIT MOD MDM: CPT

## 2021-02-23 PROCEDURE — 86140 C-REACTIVE PROTEIN: CPT

## 2021-02-23 PROCEDURE — 700105 HCHG RX REV CODE 258: Performed by: EMERGENCY MEDICINE

## 2021-02-23 PROCEDURE — 87040 BLOOD CULTURE FOR BACTERIA: CPT

## 2021-02-23 PROCEDURE — 73630 X-RAY EXAM OF FOOT: CPT | Mod: LT

## 2021-02-23 PROCEDURE — 700102 HCHG RX REV CODE 250 W/ 637 OVERRIDE(OP): Performed by: EMERGENCY MEDICINE

## 2021-02-23 PROCEDURE — 80053 COMPREHEN METABOLIC PANEL: CPT

## 2021-02-23 PROCEDURE — 85652 RBC SED RATE AUTOMATED: CPT

## 2021-02-23 PROCEDURE — 85025 COMPLETE CBC W/AUTO DIFF WBC: CPT

## 2021-02-23 PROCEDURE — A9270 NON-COVERED ITEM OR SERVICE: HCPCS | Performed by: EMERGENCY MEDICINE

## 2021-02-23 PROCEDURE — 85610 PROTHROMBIN TIME: CPT

## 2021-02-23 RX ORDER — ACETAMINOPHEN 500 MG
1000 TABLET ORAL ONCE
Status: DISCONTINUED | OUTPATIENT
Start: 2021-02-23 | End: 2021-02-23 | Stop reason: HOSPADM

## 2021-02-23 RX ORDER — AMOXICILLIN 500 MG/1
1000 CAPSULE ORAL 2 TIMES DAILY
Qty: 28 CAPSULE | Refills: 0 | Status: SHIPPED | OUTPATIENT
Start: 2021-02-23 | End: 2021-03-02

## 2021-02-23 RX ORDER — DOXYCYCLINE 100 MG/1
100 CAPSULE ORAL 2 TIMES DAILY
Qty: 14 CAPSULE | Refills: 0 | Status: SHIPPED | OUTPATIENT
Start: 2021-02-23 | End: 2021-03-02

## 2021-02-23 RX ORDER — DOXYCYCLINE 100 MG/1
100 TABLET ORAL ONCE
Status: COMPLETED | OUTPATIENT
Start: 2021-02-23 | End: 2021-02-23

## 2021-02-23 RX ORDER — AMOXICILLIN 250 MG/1
1000 CAPSULE ORAL ONCE
Status: COMPLETED | OUTPATIENT
Start: 2021-02-23 | End: 2021-02-23

## 2021-02-23 RX ORDER — SODIUM CHLORIDE 9 MG/ML
1000 INJECTION, SOLUTION INTRAVENOUS ONCE
Status: COMPLETED | OUTPATIENT
Start: 2021-02-23 | End: 2021-02-23

## 2021-02-23 RX ADMIN — AMOXICILLIN 1000 MG: 250 CAPSULE ORAL at 17:51

## 2021-02-23 RX ADMIN — DOXYCYCLINE 100 MG: 100 TABLET, FILM COATED ORAL at 17:51

## 2021-02-23 RX ADMIN — SODIUM CHLORIDE 1000 ML: 9 INJECTION, SOLUTION INTRAVENOUS at 16:39

## 2021-02-23 ASSESSMENT — FIBROSIS 4 INDEX: FIB4 SCORE: 1.02

## 2021-02-23 NOTE — ED NOTES
Pt has a sore on his L upper thigh x 3 days.  Pt states it has had some drainage, but it is noted to by dry and have a scab now.  Skin red.  Pt also has a sore to his L big toe.  Pt denies pain, but states he has neuropathy.  Pt has a hx of diabetes.

## 2021-02-23 NOTE — ED PROVIDER NOTES
ED Provider Note    CHIEF COMPLAINT  Chief Complaint   Patient presents with   • Wound Check     x 3 days, LLE, small abscess noted to Left Thigh       HPI    Primary care provider: [facility]  Means of arrival: POV/Walk-in  History obtained from: Patient  History limited by: Nothing    Luis Alfredo Casimiro Javed is a 40 y.o. male who presents with concerns for 2 wounds to his left lower extremity.  3 days ago he noticed a small area of erythema to his left mid anterior thigh.  Complains of mild achy pain at that site nonradiating.  2 days ago it started draining spontaneously when he applies pressure he gets a small amount of cloudy fluid out of it.  No falls or injuries or trauma.  Many prior episodes.  The patient is a known type II diabetic with many prior skin and soft tissue infections.  Denies any fevers or chills or cough or chest pain or dyspnea.  He also notes an atraumatic wound to his left great toe that has been present for nearly a month.  Poorly controlled diabetic with known lower extremity neuropathy.  No alleviating measures noted.  He has no pain to his toe but his thigh wound has mild pain that is worsened when he squeezes on it, has not tried any medications to feel better.  Symptoms have steadily worsened over the last several days prompting him to come in for emergent evaluation he is requesting antibiotics.  Denies cough or dyspnea or known close Covid contact.    REVIEW OF SYSTEMS  Constitutional: Negative for fever or chills.   HENT: Negative for rhinorrhea or sore throat.  Respiratory: Negative for cough or shortness of breath.    Cardiovascular: Negative for chest pain or palpitations.   Gastrointestinal: Negative for nausea, vomiting, or abdominal pain.   Genitourinary: Negative for dysuria or flank pain.   Musculoskeletal: Negative for back pain or joint pain.   Skin: Positive for wound to left great toe and left thigh.  Neurological: Negative for sensory or motor changes.   See HPI for further  "details. All other systems are negative.     PAST MEDICAL HISTORY   has a past medical history of Chest pain, Hypertension, and Type II or unspecified type diabetes mellitus without mention of complication, not stated as uncontrolled.    PAST FAMILY HISTORY  Family History   Problem Relation Age of Onset   • Diabetes Mother    • Stroke Father    • Alcohol/Drug Maternal Grandfather    • Diabetes Paternal Grandfather    • Cancer Paternal Grandfather         lung   • Heart Disease Neg Hx        SOCIAL HISTORY  Social History     Tobacco Use   • Smoking status: Current Every Day Smoker     Packs/day: 1.50     Years: 21.00     Pack years: 31.50     Types: Cigarettes   • Smokeless tobacco: Never Used   Substance and Sexual Activity   • Alcohol use: No     Alcohol/week: 0.0 oz   • Drug use: Not on file     Comment: Marijuana   • Sexual activity: Not on file       SURGICAL HISTORY   has a past surgical history that includes hernia repair (age 4) and strabismus repair (age 5).    CURRENT MEDICATIONS  See MAR.    ALLERGIES  No Known Allergies    PHYSICAL EXAM  VITAL SIGNS: /84   Pulse 87   Temp 36.4 °C (97.6 °F) (Temporal)   Resp 18   Ht 1.88 m (6' 2\")   Wt (!) 149 kg (327 lb 13.2 oz)   SpO2 95%   BMI 42.09 kg/m²    Pulse ox interpretation: On room air, I interpret this pulse ox as normal.  Constitutional: Sitting up in mild distress.  HEENT: Normocephalic, atraumatic. Posterior pharynx clear, mucous membranes slightly dry.  Eyes:  EOMI. Normal sclerae.  Neck: Supple, nontender.  Chest/Pulmonary: Slightly diminished to ausculation bilaterally, no wheezes or rhonchi.  Cardiovascular: Regular rate and rhythm, no murmur.   Abdomen: Soft, nontender; no rebound, guarding, or masses.  Back: No CVA or midline tenderness.   Musculoskeletal: No deformity or edema.  See skin examination for toe evaluation.  Neuro: Clear speech, normal coordination, cranial nerves II-XII grossly intact, no focal asymmetry or sensory " deficits.   Psych: Normal mood and affect.  Skin: Skin is warm and dry, there is a 2 x 2 centimeter area of erythema with central scabbing and trace drainage with pressure to the left anterior upper thigh.  No streaking down the leg.  There is also an ulcerative wound to the medial left great toe with no prodding down to bone or drainage or spreading erythema from the toe wound.      DIAGNOSTIC STUDIES / PROCEDURES    LABS & EKG  Results for orders placed or performed during the hospital encounter of 02/23/21   CBC WITH DIFFERENTIAL   Result Value Ref Range    WBC 10.4 4.8 - 10.8 K/uL    RBC 5.09 4.70 - 6.10 M/uL    Hemoglobin 16.2 14.0 - 18.0 g/dL    Hematocrit 47.0 42.0 - 52.0 %    MCV 92.3 81.4 - 97.8 fL    MCH 31.8 27.0 - 33.0 pg    MCHC 34.5 33.7 - 35.3 g/dL    RDW 42.9 35.9 - 50.0 fL    Platelet Count 190 164 - 446 K/uL    MPV 10.6 9.0 - 12.9 fL    Neutrophils-Polys 64.00 44.00 - 72.00 %    Lymphocytes 28.40 22.00 - 41.00 %    Monocytes 6.50 0.00 - 13.40 %    Eosinophils 0.00 0.00 - 6.90 %    Basophils 0.60 0.00 - 1.80 %    Immature Granulocytes 0.50 0.00 - 0.90 %    Nucleated RBC 0.00 /100 WBC    Neutrophils (Absolute) 6.68 1.82 - 7.42 K/uL    Lymphs (Absolute) 2.97 1.00 - 4.80 K/uL    Monos (Absolute) 0.68 0.00 - 0.85 K/uL    Eos (Absolute) 0.00 0.00 - 0.51 K/uL    Baso (Absolute) 0.06 0.00 - 0.12 K/uL    Immature Granulocytes (abs) 0.05 0.00 - 0.11 K/uL    NRBC (Absolute) 0.00 K/uL   COMP METABOLIC PANEL   Result Value Ref Range    Sodium 136 135 - 145 mmol/L    Potassium 4.4 3.6 - 5.5 mmol/L    Chloride 102 96 - 112 mmol/L    Co2 22 20 - 33 mmol/L    Anion Gap 12.0 7.0 - 16.0    Glucose 182 (H) 65 - 99 mg/dL    Bun 14 8 - 22 mg/dL    Creatinine 0.78 0.50 - 1.40 mg/dL    Calcium 9.5 8.4 - 10.2 mg/dL    AST(SGOT) 24 12 - 45 U/L    ALT(SGPT) 47 2 - 50 U/L    Alkaline Phosphatase 132 (H) 30 - 99 U/L    Total Bilirubin 0.5 0.1 - 1.5 mg/dL    Albumin 4.4 3.2 - 4.9 g/dL    Total Protein 7.4 6.0 - 8.2 g/dL     Globulin 3.0 1.9 - 3.5 g/dL    A-G Ratio 1.5 g/dL   LACTIC ACID   Result Value Ref Range    Lactic Acid 2.5 (H) 0.5 - 2.0 mmol/L   PROTHROMBIN TIME   Result Value Ref Range    PT 12.4 12.0 - 14.6 sec    INR 0.95 0.87 - 1.13   Sed Rate   Result Value Ref Range    Sed Rate Westergren 5 0 - 15 mm/hour   CRP QUANTITIVE (NON-CARDIAC)   Result Value Ref Range    Stat C-Reactive Protein 0.36 0.00 - 0.75 mg/dL   LACTIC ACID   Result Value Ref Range    Lactic Acid 2.6 (H) 0.5 - 2.0 mmol/L   ESTIMATED GFR   Result Value Ref Range    GFR If African American >60 >60 mL/min/1.73 m 2    GFR If Non African American >60 >60 mL/min/1.73 m 2       RADIOLOGY  DX-FOOT-COMPLETE 3+ LEFT   Final Result      Great toe skin ulceration with no radiographic evidence of osteomyelitis. If more sensitive analysis is required, MRI could be performed      DX-CHEST-PORTABLE (1 VIEW)   Final Result      No radiographic evidence of acute cardiopulmonary process.          COURSE & MEDICAL DECISION MAKING    This is a 40 y.o. male who presents with left thigh redness and left toe ulcerative wound.  Known diabetic.    Differential Diagnosis includes but is not limited to:  Sepsis, diabetic foot ulcer, osteomyelitis, cellulitis, abscess    ED Course:  This is a 40-year-old diabetic male coming in with 2 skin lesions known diabetic.  Heart rate greater than 90 suspected skin source of infection patient could be septic, so protocol initiated.  Patient drove himself here we will give acetaminophen for pain control.  Looks slightly dehydrated I will keep him n.p.o. otherwise so I will give him a crystalloid bolus.    Mild elevation in lactate but not critical, blood pressure stable doubt severe sepsis or septic shock.  White count normal.  As such I do not think the patient has sepsis, lactic acid level is downtrending with fluids thus I feel he has had a positive response to parenteral rehydration.  Labs are otherwise reassuring no obvious osteo on x-ray,  given normal white count and reassuring inflammatory markers I think the patient is safe for discharge with oral antibiotics.  Discussed with pharmacist we will initiate amoxicillin and doxycycline, weeklong course prescribed, follow-up with primary care clinic and take photo of his toe wound once daily.  His thigh wound looks like just cellulitis there is may be trace abscess but nothing worth incision and drainage at this time.  I trust the patient will comply with antibiotics, be rechecked with PCP, he understands that if this toe infection gets any worse he is at risk for amputation, bacteremia, and severe morbidity mortality.  If he has any new or worsening symptoms or skin changes he will return to the ER immediately.    Medications   acetaminophen (TYLENOL) tablet 1,000 mg (1,000 mg Oral Refused 2/23/21 1629)   NS infusion 1,000 mL (0 mL Intravenous Stopped 2/23/21 1755)   doxycycline monohydrate (ADOXA) tablet 100 mg (100 mg Oral Given 2/23/21 1751)   amoxicillin (AMOXIL) capsule 1,000 mg (1,000 mg Oral Given 2/23/21 1751)       FINAL IMPRESSION  1. Cellulitis of left lower extremity    2. Diabetic ulcer of toe of left foot associated with type 2 diabetes mellitus, limited to breakdown of skin (HCC)    3. Type 2 diabetes mellitus with hyperglycemia, without long-term current use of insulin ([facility])    4. Essential hypertension    5. Morbid obesity ([facility])        PRESCRIPTIONS  Discharge Medication List as of 2/23/2021  6:16 PM      START taking these medications    Details   amoxicillin (AMOXIL) 500 MG Cap Take 2 Capsules by mouth 2 times a day for 7 days., Disp-28 capsule, R-0, Normal      doxycycline (MONODOX) 100 MG capsule Take 1 capsule by mouth 2 times a day for 7 days., Disp-14 capsule, R-0, Normal             FOLLOW UP  [facility], Emergency Dept  [address]  [place] [id number] [phone]  Today  If you have ANY new or worse symptoms!    [facility]  [address] [address]  [phone]  Go on 2/26/2021      -DISCHARGE-       Results, exam findings, clinical impression, presumed diagnosis, treatment options, and strict return precautions were discussed with the patient, and they verbalized understanding, agreed with, and appreciated the plan of care.    Pertinent Labs & Imaging studies reviewed and verified by myself, as well as nursing notes and the patient's past medical, family, and social histories (See chart for details).    Portions of this record were made with voice recognition software.  Despite my review, spelling/grammar/context errors may still remain.  Interpretation of this chart should be taken in this context.    Electronically signed by Milton Dowling M.D. on 2/23/2021 at 7:19 PM.

## 2021-02-23 NOTE — ED TRIAGE NOTES
"Chief Complaint   Patient presents with   • Wound Check     x 3 days, LLE, small abscess noted to Left Thigh     /84   Pulse 99   Temp 36.4 °C (97.6 °F) (Temporal)   Resp 18   Ht 1.88 m (6' 2\")   Wt (!) 149 kg (327 lb 13.2 oz)   SpO2 95%   BMI 42.09 kg/m²      Covid Screen Negative.    "

## 2021-02-24 NOTE — DISCHARGE INSTRUCTIONS
You were seen and evaluated in the Emergency Department at [facility] for:     Wound to your left thigh and left toe.    You had the following tests and studies:    Thankfully work-up today is reassuring and stable, we do not see any serious bony infection, but this could be an early bone infection, we will treat this with antibiotics but if you do not get any better and especially to get any worse to be seen again right away.    You received the following medications:    Acetaminophen, doxycycline, amoxicillin.    You received the following prescriptions:    Doxycycline and amoxicillin, take as prescribed.  ----------------------------    Please make sure to follow up with:    Primary care provider for recheck and routine care, but if you have any worsening pain or redness or swelling or fevers or vomiting or any other new or worsening symptoms return to the ER immediately.    Good luck, we hope you get better soon!  ----------------------------    We always encourage patients to return IMMEDIATELY if they have:  Increased or changing pain, passing out, fevers over 100.4 (taken in your mouth or rectally) for more than 2 days, redness or swelling of skin or tissues, feeling like your heart is beating fast, chest pain that is new or worsening, trouble breathing, feeling like your throat is closing up and can not breath, inability to walk, weakness of any part of your body, new dizziness, severe bleeding that won't stop from any part of your body, if you can't eat or drink, or if you have any other concerns.   If you feel worse, please know that you can always return with any questions, concerns, worse symptoms, or you are feeling unsafe. We certainly cannot say for sure that we have ruled out every illness or dangerous disease, but we feel that at this specific time, your exam, tests, and vital signs like heart rate and blood pressure are safe for discharge.

## 2021-05-13 ENCOUNTER — NON-PROVIDER VISIT (OUTPATIENT)
Dept: URGENT CARE | Facility: PHYSICIAN GROUP | Age: 41
End: 2021-05-13

## 2021-05-13 DIAGNOSIS — Z02.1 PRE-EMPLOYMENT DRUG SCREENING: ICD-10-CM

## 2021-05-13 LAB
AMP AMPHETAMINE: NORMAL
COC COCAINE: NORMAL
INT CON NEG: NORMAL
INT CON POS: NORMAL
MET METHAMPHETAMINES: NORMAL
OPI OPIATES: NORMAL
PCP PHENCYCLIDINE: NORMAL
POC DRUG COMMENT 753798-OCCUPATIONAL HEALTH: NEGATIVE
THC: NORMAL

## 2021-05-13 PROCEDURE — 80305 DRUG TEST PRSMV DIR OPT OBS: CPT | Performed by: PHYSICIAN ASSISTANT

## 2021-10-06 ENCOUNTER — OFFICE VISIT (OUTPATIENT)
Dept: MEDICAL GROUP | Facility: CLINIC | Age: 41
End: 2021-10-06
Payer: MEDICAID

## 2021-10-06 VITALS
TEMPERATURE: 97.6 F | SYSTOLIC BLOOD PRESSURE: 120 MMHG | HEART RATE: 84 BPM | RESPIRATION RATE: 22 BRPM | OXYGEN SATURATION: 95 % | DIASTOLIC BLOOD PRESSURE: 70 MMHG | WEIGHT: 315 LBS | BODY MASS INDEX: 41.6 KG/M2

## 2021-10-06 DIAGNOSIS — E66.9 DIABETES MELLITUS TYPE 2 IN OBESE: ICD-10-CM

## 2021-10-06 DIAGNOSIS — E11.69 DIABETES MELLITUS TYPE 2 IN OBESE: ICD-10-CM

## 2021-10-06 DIAGNOSIS — Z71.6 TOBACCO ABUSE COUNSELING: ICD-10-CM

## 2021-10-06 DIAGNOSIS — I10 PRIMARY HYPERTENSION: ICD-10-CM

## 2021-10-06 PROCEDURE — 99214 OFFICE O/P EST MOD 30 MIN: CPT | Mod: GC | Performed by: FAMILY MEDICINE

## 2021-10-06 RX ORDER — ATORVASTATIN CALCIUM 40 MG/1
40 TABLET, FILM COATED ORAL NIGHTLY
COMMUNITY
End: 2022-03-29 | Stop reason: SDUPTHER

## 2021-10-06 ASSESSMENT — FIBROSIS 4 INDEX: FIB4 SCORE: 0.79

## 2021-10-06 NOTE — ASSESSMENT & PLAN NOTE
- Counseled on tobacco use  - Not interested on other medicines at this time   - F/u in 3 months regarding this

## 2021-10-06 NOTE — PROGRESS NOTES
[facility] MEDICINE     PATIENT ID:  NAME:  Luis Alfredo Javed  MRN:               [id number]  YOB: 1980    Date: 10:46 AM      Resident: Jones Wilkes DO    CC: F/u DM2      HPI: Luis Alfredo Javed is a 41 y.o. male who presented for f/u     Problem   Tobacco Abuse Counseling    - Hx of tobacco use, smoked for >20 years  - Smoke about 1 pack per day  - Tried chantix w/o benefit as well as Wellbutrin   - Has patches at home - would like to quit      Diabetes mellitus type 2 in obese (HCC)    Diagnosed Jan 2014  - A1C was 9.2 in Jan 2021  - Currently on Metformin 1000mg BID,  liraglutide 0.6mg daily, and empagliflozin 10mg daily   - Last monofilament check 2021  - Optometry appointment 11/10/21  - Last A1C 6/1/21 was 7.6         Htn (Hypertension)    Diagnosed in 2014, started on metoprolol 25 mg twice a day and lisinopril 10mg  - /70 today           REVIEW OF SYSTEMS:   Ten systems reviewed and were negative except as noted in the HPI.                PROBLEM LIST  Patient Active Problem List   Diagnosis   • HTN (hypertension)   • Chest pain   • Diabetes mellitus type 2 in obese (HCC)   • Hypertriglyceridemia   • GERD (gastroesophageal reflux disease)   • Tobacco abuse counseling   • Morbid obesity (HCC)   • Anxiety   • Sleep apnea in adult   • Otalgia of both ears   • Left sided sciatica   • Cellulitis of extremity   • Alcohol abuse, episodic   • Acute right flank pain   • Atypical chest pain   • Acute pain of both shoulders   • Cellulitis of left axilla   • Rotator cuff tendonitis, right   • Normocytic anemia   • Generalized body aches        PAST SURGICAL HISTORY:  Past Surgical History:   Procedure Laterality Date   • HERNIA REPAIR  age 4   • STRABISMUS REPAIR  age 5       FAMILY HISTORY:  Family History   Problem Relation Age of Onset   • Diabetes Mother    • Stroke Father    • Alcohol/Drug Maternal Grandfather    • Diabetes Paternal Grandfather    • Cancer Paternal Grandfather         lung   • Heart Disease  Neg Hx        SOCIAL HISTORY:   Social History     Tobacco Use   • Smoking status: Current Every Day Smoker     Packs/day: 1.50     Years: 21.00     Pack years: 31.50     Types: Cigarettes   • Smokeless tobacco: Never Used   Substance Use Topics   • Alcohol use: No     Alcohol/week: 0.0 oz       ALLERGIES:  No Known Allergies    OUTPATIENT MEDICATIONS:    Current Outpatient Medications:   •  atorvastatin (LIPITOR) 40 MG Tab, Take 40 mg by mouth every evening., Disp: , Rfl:   •  Empagliflozin 10 MG Tab, Take 10 mg by mouth. Once daily, Disp: , Rfl:   •  liraglutide (VICTOZA) 18 MG/3ML Solution Pen-injector, Inject 0.6 mg as instructed every day., Disp: , Rfl:   •  metFORMIN (GLUCOPHAGE) 500 MG Tab, TAKE 2 TABS BY MOUTH 2 TIMES A DAY, WITH MEALS., Disp: 120 Tab, Rfl: 1  •  metoprolol SR (TOPROL XL) 50 MG TABLET SR 24 HR, TAKE 1 TAB BY MOUTH EVERY DAY. TAKE 1 TAB BY MOUTH EVERY DAY., Disp: 30 Tab, Rfl: 1  •  lisinopril (PRINIVIL) 10 MG Tab, Take 1 Tab by mouth every day., Disp: 30 Tab, Rfl: 11    PHYSICAL EXAM:  Vitals:    10/06/21 0946   BP: 120/70   Pulse: 84   Resp: (!) 22   Temp: 36.4 °C (97.6 °F)   SpO2: 95%   Weight: (!) 147 kg (324 lb)       General: Pt resting in NAD, cooperative   Skin:  Pink, warm and dry.  HEENT: NC/AT. EOMI.  Lungs:  Symmetrical.  CTAB, good air movement   Cardiovascular:  S1/S2 RRR   Abdomen:  Abdomen is soft, nontender  Extremities:  Full range of motion.  CNS:  Muscle tone is normal. No gross focal neurologic deficits      ASSESSMENT/PLAN:   41 y.o. male     Problem List Items Addressed This Visit     HTN (hypertension)    Relevant Medications    atorvastatin (LIPITOR) 40 MG Tab    Diabetes mellitus type 2 in obese (HCC)    Relevant Medications    Empagliflozin 10 MG Tab    Other Relevant Orders    Diabetic Monofilament Lower Extremity Exam (Completed)    AMB REFERRAL TO PEDIATRIC OPHTHALMOLOGY    Comp Metabolic Panel    Hemoglobin A1c    Lipid Profile    Microalbumin Creat Ratio Urine -  Lab Collect    REFERRAL TO DIABETIC EDUCATION    REFERRAL TO OPHTHALMOLOGY    Tobacco abuse counseling     - Counseled on tobacco use  - Not interested on other medicines at this time   - F/u in 3 months regarding this                Jones Wilkes DO  PGY-3  [facility]

## 2021-10-20 RX ORDER — CALCIUM CITRATE/VITAMIN D3 200MG-6.25
TABLET ORAL
COMMUNITY
Start: 2021-09-09 | End: 2021-10-20 | Stop reason: SDUPTHER

## 2021-10-22 RX ORDER — CALCIUM CITRATE/VITAMIN D3 200MG-6.25
TABLET ORAL
Qty: 100 STRIP | Refills: 0 | Status: SHIPPED | OUTPATIENT
Start: 2021-10-22 | End: 2022-08-30

## 2021-11-02 ENCOUNTER — TELEPHONE (OUTPATIENT)
Dept: MEDICAL GROUP | Facility: CLINIC | Age: 41
End: 2021-11-02

## 2021-11-02 NOTE — TELEPHONE ENCOUNTER
MEDICATION PRIOR AUTHORIZATION NEEDED:    1. Name of Medication: True Metrix Blood Glucose Test In Vitro Strip    2. Requested By (Name of Pharmacy):   [id number] - [place], [place] - [address]  [address] [address]  Phone: [phone] Fax: [phone]     3. Is insurance on file current? Yes    4. What is the name & phone number of the 3rd party payor? [employer] - [id number]

## 2021-12-13 NOTE — TELEPHONE ENCOUNTER
I called the pharmacy and spoke to Jas. They stated that patient picked up RX on 11/22/2021 with no issues and no PA needed.

## 2022-03-29 ENCOUNTER — OFFICE VISIT (OUTPATIENT)
Dept: MEDICAL GROUP | Facility: CLINIC | Age: 42
End: 2022-03-29
Payer: COMMERCIAL

## 2022-03-29 VITALS
RESPIRATION RATE: 18 BRPM | TEMPERATURE: 97.4 F | HEART RATE: 92 BPM | BODY MASS INDEX: 40.43 KG/M2 | OXYGEN SATURATION: 96 % | DIASTOLIC BLOOD PRESSURE: 80 MMHG | HEIGHT: 74 IN | SYSTOLIC BLOOD PRESSURE: 130 MMHG | WEIGHT: 315 LBS

## 2022-03-29 DIAGNOSIS — E66.9 DIABETES MELLITUS TYPE 2 IN OBESE: ICD-10-CM

## 2022-03-29 DIAGNOSIS — Z76.0 MEDICATION REFILL: ICD-10-CM

## 2022-03-29 DIAGNOSIS — L84 CALLUS OF FOOT: ICD-10-CM

## 2022-03-29 DIAGNOSIS — E11.69 DIABETES MELLITUS TYPE 2 IN OBESE: ICD-10-CM

## 2022-03-29 LAB
HBA1C MFR BLD: 7.6 % (ref 0–5.6)
INT CON NEG: ABNORMAL
INT CON POS: ABNORMAL

## 2022-03-29 PROCEDURE — 99213 OFFICE O/P EST LOW 20 MIN: CPT | Mod: GE

## 2022-03-29 PROCEDURE — 83036 HEMOGLOBIN GLYCOSYLATED A1C: CPT

## 2022-03-29 RX ORDER — METOPROLOL SUCCINATE 50 MG/1
50 TABLET, EXTENDED RELEASE ORAL
Qty: 184 TABLET | Refills: 0 | Status: SHIPPED | OUTPATIENT
Start: 2022-03-29 | End: 2022-06-27

## 2022-03-29 RX ORDER — ATORVASTATIN CALCIUM 40 MG/1
40 TABLET, FILM COATED ORAL NIGHTLY
Qty: 184 TABLET | Refills: 0 | Status: SHIPPED | OUTPATIENT
Start: 2022-03-29 | End: 2022-09-29

## 2022-03-29 RX ORDER — LISINOPRIL 10 MG/1
10 TABLET ORAL
Qty: 184 TABLET | Refills: 0 | Status: SHIPPED | OUTPATIENT
Start: 2022-03-29 | End: 2022-09-29

## 2022-03-29 RX ORDER — LIRAGLUTIDE 6 MG/ML
0.6 INJECTION SUBCUTANEOUS DAILY
Qty: 18.4 ML | Refills: 0 | Status: SHIPPED | OUTPATIENT
Start: 2022-03-29 | End: 2022-06-28

## 2022-03-29 ASSESSMENT — FIBROSIS 4 INDEX: FIB4 SCORE: 0.84

## 2022-03-29 NOTE — PROGRESS NOTES
[facility]     PATIENT ID:  NAME:  Luis Alfredo Javed  MRN:               [id number]  YOB: 1980    Date: 4:39 PM      Resident: Hugo Bell MD     CC:  Medication refill      HPI: Luis Alfredo Javed is a 41 y.o. male who presents to the clinic requesting medication refill of his diabetic medications.  He also states that he has not had an A1c performed in the past 4 months and would like this repeated.  Patient states that he has been trying to make changes to his diet but is continue drinking soda on the weekends and feels this is the biggest dietary issue.  Patient states that he is working and has limited time to exercise.  He notes that he had to wear new work boots with a composite toe for recent job and did develop a blister to his left great toe.  He states that it initially presented as a blood blister in  has since callused over.  He denies any pain to bilateral feet states that his chronic neuropathy has not worsened.  He denies any increased urination or change his dietary habits.  He reports no additional complaints or concerns at this time.      REVIEW OF SYSTEMS:   Ten systems reviewed and were negative except as noted in the HPI.                PROBLEM LIST  Patient Active Problem List   Diagnosis   • HTN (hypertension)   • Chest pain   • Diabetes mellitus type 2 in obese (HCC)   • Hypertriglyceridemia   • GERD (gastroesophageal reflux disease)   • Tobacco abuse counseling   • Morbid obesity (HCC)   • Anxiety   • Sleep apnea in adult   • Otalgia of both ears   • Left sided sciatica   • Cellulitis of extremity   • Alcohol abuse, episodic   • Acute right flank pain   • Atypical chest pain   • Acute pain of both shoulders   • Cellulitis of left axilla   • Rotator cuff tendonitis, right   • Normocytic anemia   • Generalized body aches        PAST SURGICAL HISTORY:  Past Surgical History:   Procedure Laterality Date   • HERNIA REPAIR  age 4   • STRABISMUS REPAIR  age 5       FAMILY  "HISTORY:  Family History   Problem Relation Age of Onset   • Diabetes Mother    • Stroke Father    • Alcohol/Drug Maternal Grandfather    • Diabetes Paternal Grandfather    • Cancer Paternal Grandfather         lung   • Heart Disease Neg Hx        SOCIAL HISTORY:   Social History     Tobacco Use   • Smoking status: Current Every Day Smoker     Packs/day: 1.50     Years: 21.00     Pack years: 31.50     Types: Cigarettes   • Smokeless tobacco: Never Used   Substance Use Topics   • Alcohol use: No     Alcohol/week: 0.0 oz       ALLERGIES:  No Known Allergies    OUTPATIENT MEDICATIONS:    Current Outpatient Medications:   •  liraglutide (VICTOZA) 18 MG/3ML Solution Pen-injector, Inject 0.6 mg under the skin every day for 184 days., Disp: 18.4 mL, Rfl: 0  •  Empagliflozin 10 MG Tab, Take 10 mg by mouth every day for 184 days. Once daily, Disp: 184 Tablet, Rfl: 0  •  atorvastatin (LIPITOR) 40 MG Tab, Take 1 Tablet by mouth every evening for 184 days., Disp: 184 Tablet, Rfl: 0  •  lisinopril (PRINIVIL) 10 MG Tab, Take 1 Tablet by mouth every day for 184 days., Disp: 184 Tablet, Rfl: 0  •  metFORMIN (GLUCOPHAGE) 500 MG Tab, Take 2 Tablets by mouth 2 times a day with meals for 184 days., Disp: 736 Tablet, Rfl: 0  •  metoprolol SR (TOPROL XL) 50 MG TABLET SR 24 HR, Take 1 Tablet by mouth every day for 184 days. TAKE 1 TAB BY MOUTH EVERY DAY., Disp: 184 Tablet, Rfl: 0  •  TRUE METRIX BLOOD GLUCOSE TEST strip, AS INSTRUCTED, Disp: 100 Strip, Rfl: 0    PHYSICAL EXAM:  Vitals:    03/29/22 1602   BP: 130/80   BP Location: Right arm   Patient Position: Sitting   BP Cuff Size: Adult   Pulse: 92   Resp: 18   Temp: 36.3 °C (97.4 °F)   SpO2: 96%   Weight: (!) 146 kg (322 lb 3.2 oz)   Height: 1.88 m (6' 2\")       General: Pt resting in NAD, pleasant and cooperative   Skin:  Pink, warm and dry.  HEENT: NC/AT. EOMI. PERRLA, neck supple, no lymphadenopathy  Lungs:  Symmetrical.  CTAB, good air movement, no respiratory " distress  Cardiovascular:  S1/S2 RRR, no murmurs rubs or gallops, warm and well-perfused  Abdomen:  Abdomen is soft, nontender  Extremities:  Full range of motion.  There is a 2 cm ecchymotic callus to the medial aspect of the left hallux and a 3 mm region of ecchymosis to the medial aspect of the distal second toe.  There is no evidence of erythema around either of these sites.  There is no fluctuance or drainage or swelling.  CNS:  Muscle tone is normal. No gross focal neurologic deficits      ASSESSMENT/PLAN:   41 y.o. male presents to the clinic reporting that he needs a refill of his diabetic medications.  Patient states the last A1c was 7.9 mg/dL approximately 4 months ago.  Repeat A1c in the clinic today was again 7.9 mg/dL.  We discussed the patient's dietary habits and advised him to continue trying to make strides and healthier eating.  The patient understands that he should be doing and states that he is just having difficulty on weekends when he is trying to relax.  I will provide the patient with referral to podiatry at this time for nail care and evaluation/possible unroofing of thick callus to his left hallux.  Patient states he understands and agrees with this.  He states he has been to podiatry before in the past and is requesting referral to a different clinic.  I advised him to discuss this with our referral center to make sure that he finds an office that accepts his insurance and that he is happy with.  Patient states he understands and agrees and will follow this course.  Patient will follow up in clinic in 3 months.    Problem List Items Addressed This Visit     Diabetes mellitus type 2 in obese (HCC)    Relevant Medications    liraglutide (VICTOZA) 18 MG/3ML Solution Pen-injector    Empagliflozin 10 MG Tab    metFORMIN (GLUCOPHAGE) 500 MG Tab    Other Relevant Orders    POCT  A1C    Referral to Podiatry      Other Visit Diagnoses     Callus of foot        Relevant Orders    Referral to  Podiatry          Hugo Bell MD   PGY-1  [facility]      AS per patient "Bladder feels full>" All other VSS, a&ox4, no c/o pain or discomfort, otherwise asymptomatic All other VSS, a&ox4, no other c/o, otherwise asymptomatic, resting comfortably Pt c/o mild nausea and feeling "lowsy" as per patient. However, denies any c/o pain at this time. All other VSS. Pt c/o to RN that she felt like her heart was racing after ambulating to bathroom with RN. When returned to bed HR was elevated and ranging from 114-138 on pulse ox. Blood pressure elevated and patient dyspneic on exertion with oxygen saturation of 85% on room air. Placed on 2L nc and sat increased to 91%. Pt states she used to have a-fib but no longer has it. Pt in bed, complaining of general discomfort and nausea. All other VSS. Pt states she is unable to fully void. Pt voided 300 cc, post void bladder scan showed 999 cc of  residual urine. Pt states she is unable to pee without her pessary in place. Pt has been straight cath'd two times prior. alert and oriented x4 pt  was only able to void 100cc of urine. Pt denies the urge to void any further. tachycardiac, Anxious, Troponin sent VSS except mildly hypotensive, a&ox4, otherwise asymptomatic. Pt having mult. episodes of diarrhea, the last two were dark brown/red in color.

## 2022-05-03 ENCOUNTER — TELEPHONE (OUTPATIENT)
Dept: MEDICAL GROUP | Facility: CLINIC | Age: 42
End: 2022-05-03
Payer: COMMERCIAL

## 2022-05-03 NOTE — TELEPHONE ENCOUNTER
[facility] called and stated that the insurance isn't covering Victoza anymore and would cover Trulicity or Ozempic for his insulin? Please advise and send a new prescription to [facility] please.

## 2022-05-12 ENCOUNTER — TELEPHONE (OUTPATIENT)
Dept: PHYSICAL THERAPY | Facility: REHABILITATION | Age: 42
End: 2022-05-12
Payer: COMMERCIAL

## 2022-05-12 NOTE — OP THERAPY DISCHARGE SUMMARY
Outpatient Physical Therapy  DISCHARGE SUMMARY NOTE      [facility] [address], [address]  [place] [address]  Phone:  [phone]    Date of Visit: 05/12/2022    Patient: Luis Alfredo Javed  YOB: 1980  MRN: [id number]     Referring Provider:  Nicho Tovar M.D.     Referring Diagnosis  Right shoulder pain, unspecified chronicity            Functional Assessment Used        Your patient is being discharged from Physical Therapy with the following comments:   · Patient has failed to schedule or reschedule follow-up visits    Comments:  Please see daily treatment notes for details     Limitations Remaining:      Recommendations:  Discharge from PT    Fatou Sharp PT, MSPT    Date: 5/12/2022

## 2022-06-28 ENCOUNTER — OFFICE VISIT (OUTPATIENT)
Dept: MEDICAL GROUP | Facility: CLINIC | Age: 42
End: 2022-06-28
Payer: COMMERCIAL

## 2022-06-28 VITALS
HEIGHT: 74 IN | TEMPERATURE: 98 F | RESPIRATION RATE: 20 BRPM | DIASTOLIC BLOOD PRESSURE: 87 MMHG | BODY MASS INDEX: 40.43 KG/M2 | SYSTOLIC BLOOD PRESSURE: 126 MMHG | OXYGEN SATURATION: 92 % | WEIGHT: 315 LBS | HEART RATE: 101 BPM

## 2022-06-28 DIAGNOSIS — R53.83 FATIGUE, UNSPECIFIED TYPE: ICD-10-CM

## 2022-06-28 DIAGNOSIS — E11.69 DIABETES MELLITUS TYPE 2 IN OBESE: ICD-10-CM

## 2022-06-28 DIAGNOSIS — E66.9 DIABETES MELLITUS TYPE 2 IN OBESE: ICD-10-CM

## 2022-06-28 LAB
HBA1C MFR BLD: 8.9 % (ref 0–5.6)
INT CON NEG: ABNORMAL
INT CON POS: ABNORMAL

## 2022-06-28 PROCEDURE — 83036 HEMOGLOBIN GLYCOSYLATED A1C: CPT | Mod: GC | Performed by: STUDENT IN AN ORGANIZED HEALTH CARE EDUCATION/TRAINING PROGRAM

## 2022-06-28 PROCEDURE — 99214 OFFICE O/P EST MOD 30 MIN: CPT | Mod: GC | Performed by: STUDENT IN AN ORGANIZED HEALTH CARE EDUCATION/TRAINING PROGRAM

## 2022-06-28 RX ORDER — DAPAGLIFLOZIN 10 MG/1
10 TABLET, FILM COATED ORAL DAILY
Qty: 30 TABLET | Refills: 4 | Status: SHIPPED | OUTPATIENT
Start: 2022-06-28 | End: 2022-06-28

## 2022-06-28 RX ORDER — SEMAGLUTIDE 1.34 MG/ML
0.25 INJECTION, SOLUTION SUBCUTANEOUS
Qty: 1.5 ML | Refills: 5 | Status: SHIPPED | OUTPATIENT
Start: 2022-06-28

## 2022-06-28 RX ORDER — DAPAGLIFLOZIN 10 MG/1
10 TABLET, FILM COATED ORAL DAILY
Qty: 30 TABLET | Refills: 4 | Status: SHIPPED | OUTPATIENT
Start: 2022-06-28 | End: 2024-03-22

## 2022-06-28 RX ORDER — METOPROLOL SUCCINATE 50 MG/1
TABLET, EXTENDED RELEASE ORAL
Qty: 90 TABLET | Refills: 3 | Status: SHIPPED | OUTPATIENT
Start: 2022-06-28

## 2022-06-28 ASSESSMENT — FIBROSIS 4 INDEX: FIB4 SCORE: 0.86

## 2022-06-28 NOTE — PROGRESS NOTES
[facility] MEDICINE     PATIENT ID:  NAME:  Luis Alfredo Javed  MRN:               [id number]  YOB: 1980    Resident: Jones Wilkes DO    CC:  F/u       HPI: Luis Alfredo Javed is a 42 y.o. male who presented for f/u     Problem   Fatigue    - Patient w/ generalized fatigue x1 day  - Reports myalgias and bone achiness   - Very mild cough and congestion  - No ear pain, throat pain, no fevers, no chills  - Tested for COVID yesterday and was negative  - Didn't get a flu shot this year  - Wife with similar symptoms      Diabetes mellitus type 2 in obese (HCC)    - Hx of DM2 - diagnosed Jan 2014  - A1C was 8.9 in June 2022  - Regiment:   Currently on Metformin 1000mg BID,   Liraglutide 0.6mg daily   Empagliflozin 10mg daily      Currently on Metformin but hasn't been on liraglutide or empagliflozin in 2 months because insurance won't cover it    - Last monofilament check 2021  - Optometry appointment 11/10/21  - Last A1C 6/1/21 was 7.6             REVIEW OF SYSTEMS:   Ten systems reviewed and were negative except as noted in the HPI.                PROBLEM LIST  Patient Active Problem List   Diagnosis   • HTN (hypertension)   • Chest pain   • Diabetes mellitus type 2 in obese (HCC)   • Hypertriglyceridemia   • GERD (gastroesophageal reflux disease)   • Tobacco abuse counseling   • Morbid obesity (HCC)   • Anxiety   • Sleep apnea in adult   • Otalgia of both ears   • Left sided sciatica   • Cellulitis of extremity   • Alcohol abuse, episodic   • Acute right flank pain   • Atypical chest pain   • Acute pain of both shoulders   • Cellulitis of left axilla   • Rotator cuff tendonitis, right   • Normocytic anemia   • Generalized body aches   • Fatigue        PAST SURGICAL HISTORY:  Past Surgical History:   Procedure Laterality Date   • HERNIA REPAIR  age 4   • STRABISMUS REPAIR  age 5       FAMILY HISTORY:  Family History   Problem Relation Age of Onset   • Diabetes Mother    • Stroke Father    • Alcohol/Drug Maternal  "Grandfather    • Diabetes Paternal Grandfather    • Cancer Paternal Grandfather         lung   • Heart Disease Neg Hx        SOCIAL HISTORY:   Social History     Tobacco Use   • Smoking status: Current Every Day Smoker     Packs/day: 1.50     Years: 21.00     Pack years: 31.50     Types: Cigarettes   • Smokeless tobacco: Never Used   Substance Use Topics   • Alcohol use: No     Alcohol/week: 0.0 oz       ALLERGIES:  No Known Allergies    OUTPATIENT MEDICATIONS:    Current Outpatient Medications:   •  Semaglutide,0.25 or 0.5MG/DOS, (OZEMPIC, 0.25 OR 0.5 MG/DOSE,) 2 MG/1.5ML Solution Pen-injector, Inject 0.25 mg under the skin every 7 days., Disp: 1.5 mL, Rfl: 5  •  dapagliflozin propanediol (FARXIGA) 10 MG Tab, Take 1 Tablet by mouth every day., Disp: 30 Tablet, Rfl: 4  •  metoprolol SR (TOPROL XL) 50 MG TABLET SR 24 HR, TAKE ONE TABLET BY MOUTH DAILY, Disp: 90 Tablet, Rfl: 3  •  atorvastatin (LIPITOR) 40 MG Tab, Take 1 Tablet by mouth every evening for 184 days., Disp: 184 Tablet, Rfl: 0  •  lisinopril (PRINIVIL) 10 MG Tab, Take 1 Tablet by mouth every day for 184 days., Disp: 184 Tablet, Rfl: 0  •  metFORMIN (GLUCOPHAGE) 500 MG Tab, Take 2 Tablets by mouth 2 times a day with meals for 184 days., Disp: 736 Tablet, Rfl: 0  •  TRUE METRIX BLOOD GLUCOSE TEST strip, AS INSTRUCTED, Disp: 100 Strip, Rfl: 0    PHYSICAL EXAM:  Vitals:    06/28/22 1411   BP: 126/87   BP Location: Left arm   Patient Position: Sitting   BP Cuff Size: Large adult   Pulse: (!) 101   Resp: 20   Temp: 36.7 °C (98 °F)   TempSrc: Temporal   SpO2: 92%   Weight: (!) 144 kg (318 lb)   Height: 1.88 m (6' 2\")       General: Pt resting in NAD, cooperative   Skin:  Pink, warm and dry.  HEENT: NC/AT. EOMI.  Lungs:  Symmetrical.  CTAB, good air movement   Cardiovascular:  S1/S2 RRR   Abdomen:  Abdomen is soft, nontender  Extremities:  Full range of motion.  CNS:  Muscle tone is normal. No gross focal neurologic deficits      ASSESSMENT/PLAN:   42 y.o. male "     Problem List Items Addressed This Visit     Diabetes mellitus type 2 in obese (HCC)     - Hx of DM2 - diagnosed Jan 2014  - A1C was 8.9 in June 2022  - Regiment:   Currently on Metformin 1000mg BID,   Liraglutide 0.6mg daily   Empagliflozin 10mg daily      Currently on Metformin but hasn't been on liraglutide or empagliflozin in 2 months because insurance won't cover it    - Last monofilament check 2021  - Optometry appointment 11/10/21  - Last A1C 6/1/21 was 7.6      A/P  - A1C going up >1 point in 3 months  - Patient off GLP1 and SGLT-2 for 2 months because insurance wont' cover it  - Prescribing Ozempic 0.25mg weekly  - Prescribing Farxiga 10mg daily   - F/u in ~1-2 weeks to make sure patient's meds were approved by insurance           Relevant Medications    Semaglutide,0.25 or 0.5MG/DOS, (OZEMPIC, 0.25 OR 0.5 MG/DOSE,) 2 MG/1.5ML Solution Pen-injector    dapagliflozin propanediol (FARXIGA) 10 MG Tab    Other Relevant Orders    POCT Hemoglobin A1C (Completed)    Fatigue     - Patient w/ generalized fatigue x1 day  - Reports myalgias and bone achiness   - Very mild cough and congestion  - No ear pain, throat pain, no fevers, no chills  - Tested for COVID yesterday and was negative  - Didn't get a flu shot this year  - Wife with similar symptoms     A/P  - Normal exam, flu negative here today  - COVID negative yesterday  - Recommend hydration, check blood sugars periodically  - Consider rechecking COVID in 2-3 days  - F/u in ~1 week                 Jones Wilkes, DO  PGY-3  [facility]

## 2022-06-28 NOTE — ASSESSMENT & PLAN NOTE
- Patient w/ generalized fatigue x1 day  - Reports myalgias and bone achiness   - Very mild cough and congestion  - No ear pain, throat pain, no fevers, no chills  - Tested for COVID yesterday and was negative  - Didn't get a flu shot this year  - Wife with similar symptoms     A/P  - Normal exam, flu negative here today  - COVID negative yesterday  - Recommend hydration, check blood sugars periodically  - Consider rechecking COVID in 2-3 days  - F/u in ~1 week

## 2022-06-28 NOTE — ASSESSMENT & PLAN NOTE
- Hx of DM2 - diagnosed Jan 2014  - A1C was 8.9 in June 2022  - Regiment:   Currently on Metformin 1000mg BID,   Liraglutide 0.6mg daily   Empagliflozin 10mg daily      Currently on Metformin but hasn't been on liraglutide or empagliflozin in 2 months because insurance won't cover it    - Last monofilament check 2021  - Optometry appointment 11/10/21  - Last A1C 6/1/21 was 7.6      A/P  - A1C going up >1 point in 3 months  - Patient off GLP1 and SGLT-2 for 2 months because insurance wont' cover it  - Prescribing Ozempic 0.25mg weekly  - Prescribing Farxiga 10mg daily   - F/u in ~1-2 weeks to make sure patient's meds were approved by insurance

## 2022-07-08 ENCOUNTER — APPOINTMENT (OUTPATIENT)
Dept: MEDICAL GROUP | Facility: CLINIC | Age: 42
End: 2022-07-08
Payer: COMMERCIAL

## 2022-08-18 ENCOUNTER — OCCUPATIONAL MEDICINE (OUTPATIENT)
Dept: URGENT CARE | Facility: CLINIC | Age: 42
End: 2022-08-18
Payer: COMMERCIAL

## 2022-08-18 VITALS
SYSTOLIC BLOOD PRESSURE: 110 MMHG | WEIGHT: 315 LBS | HEART RATE: 92 BPM | RESPIRATION RATE: 16 BRPM | BODY MASS INDEX: 40.43 KG/M2 | TEMPERATURE: 98.6 F | OXYGEN SATURATION: 97 % | DIASTOLIC BLOOD PRESSURE: 70 MMHG | HEIGHT: 74 IN

## 2022-08-18 DIAGNOSIS — M77.8 RIGHT ELBOW TENDONITIS: ICD-10-CM
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
## 2022-08-18 PROCEDURE — 99203 OFFICE O/P NEW LOW 30 MIN: CPT | Performed by: PHYSICIAN ASSISTANT

## 2022-08-18 ASSESSMENT — FIBROSIS 4 INDEX: FIB4 SCORE: 0.86

## 2022-08-18 NOTE — PROGRESS NOTES
"Subjective:     Luis Alfredo Javed is a 42 y.o. male who presents for Other (NEW  DOI: 8/01/22 (R) arm pain )      Date of injury 8/1/2022: Patient presents for insidious onset right lateral elbow and tricep pain worsening throughout his work weeks.  No acute trauma or injury however patient first noted symptoms at stated date and they seem to be progressing.  Symptoms worsen throughout his workweek and seem to be mildly relieved on the weekend.  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 He has not noted any numbness or tingling.  He has tried gentle stretching and some behavior modification.  No contributory comorbidities and no second job    PMH:   No pertinent past medical history to this problem  MEDS:  Medications were reviewed in EMR  ALLERGIES:  Allergies were reviewed in EMR  SOCH[employer]:  Works as a   FH:   No pertinent family history to this problem       Objective:     /70 (BP Location: Left arm, Patient Position: Sitting, BP Cuff Size: Large adult)   Pulse 92   Temp 37 °C (98.6 °F) (Temporal)   Resp 16   Ht 1.88 m (6' 2\")   Wt (!) 146 kg (320 lb 12.8 oz)   SpO2 97%   BMI 41.19 kg/m²     Alert nontoxic male in no acute distress.  Right elbow nontender to palpation of the bony prominences, trace tenderness over wrist extensors.  With elbow at 70 degrees and wrist in extension reproduces the patient's pain.  Negative Tinel's and Phalen's.  5 out of 5  strength.  Neurovascularly intact.  10 pound weight limit for right arm.    Assessment/Plan:       1. Right elbow tendonitis    2. Repetitive use syndrome    Released to Restricted Duty FROM 8/18/2022 TO 8/25/2022  10 pound weight limit for right arm.  Stretching and exercises discussed.  Icing and elbow compression sleeve discussed.  Patient to rest elbow, may take OTC " anti-inflammatories and follow-up in 7 days       Differential diagnosis, natural history, supportive care, and indications for immediate follow-up discussed.

## 2022-08-18 NOTE — LETTER
"EMPLOYEE’S CLAIM FOR COMPENSATION/ REPORT OF INITIAL TREATMENT  FORM C-4    EMPLOYEE’S CLAIM - PROVIDE ALL INFORMATION REQUESTED   First Name  Luis Alfredo Last Name  Javed Birthdate                    1980                Sex  male Claim Number (Insurer’s Use Only)    Home Address  [address] Age  42 y.o. 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 (if applicable)  n/a    How did this injury or occupational disease occur? (Be specific an answer in detail. Use additional sheet if necessary)  lifting bottles   If you believe that you have an occupational disease, when did you first have knowledge of the disability and it relationship to your employment?  repeated lifting Witnesses to the Accident  Prem      Nature of Injury or Occupational Disease  Strain  Part(s) of Body Injured or Affected  Elbow (R), Upper Arm (R),     I certify that the above is true and correct to the best of my knowledge and that I have provided this information in order to obtain the benefits of [place] " inclusive [place]).  I hereby authorize any physician, chiropractor, surgeon, practitioner, or other person, any hospital, including [facility] hospital, any medical service organization, any insurance company, or other institution or organization to release to each other, any medical or other information, including benefits paid or payable, pertinent to this injury or disease, except information relative to diagnosis, treatment and/or counseling for AIDS, psychological conditions, alcohol or controlled substances, for which I must give specific authorization.  A Photostat of this authorization shall be as valid as the original.     Date   Place Employee’s Original or  *Electronic Signature   THIS REPORT MUST BE COMPLETED AND MAILED WITHIN 3 WORKING DAYS OF TREATMENT   Place  [facility]  Name of Facility  [facility]   Date  8/18/2022 Diagnosis and Description of Injury or Occupational Disease  (M77.8) Right elbow tendonitis  (X50.3XXA) Repetitive use syndrome Is there evidence the injured employee was under the influence of alcohol and/or another controlled substance at the time of accident?  ? No ? Yes (if yes, please explain)    Hour  9:05 AM   Diagnoses of Right elbow tendonitis and Repetitive use syndrome were pertinent to this visit. No   Treatment  Evaluation, pathophysiology discussion, exercises, supportive care see associated paperwork  Have you advised the patient to remain off work five days or     more?    X-Ray Findings      ? Yes Indicate dates:   From   To      From information given by the employee, together with medical evidence, can        you directly connect this injury or occupational disease as job incurred?  Yes ? No If no, is the injured employee capable of:  ? full duty  No ? modified duty  Yes   Is additional medical care by a physician indicated?  Yes If Modified Duty, Specify any Limitations / Restrictions  See associated paperwork   Do  "you know of any previous injury or disease contributing to this condition or occupational disease?  ? Yes ? No (Explain if yes)                          No   Date  8/18/2022 Print Health Care Provider's   Meño Butts P.A.-C. I certify the employer’s copy of  this form was mailed on:   Address  [address] [address] Insurer’s Use Only     [facility] Zip  [address]    Provider’s Tax ID Number  [id number] Telephone  Dept: [phone]             Health Care Provider’s Original or Electronic Signature  e-SignMEÑO BUTTS P.A.-C. Degree (MD,DO, DC,ROCIO,APRN)   ROCIO      * Complete and attach Release of Information (Form C-4A) when injured employee signs C-4 Form electronically  ORIGINAL - TREATING HEALTHCARE PROVIDER PAGE 2 - INSURER/TPA PAGE 3 - EMPLOYER PAGE 4 - EMPLOYEE             Form C-4 (rev.08/21)           BRIEF DESCRIPTION OF RIGHTS AND BENEFITS  (Pursuant to [place] 616C.050)    Notice of Injury or Occupational Disease (Incident Report Form C-1): If an injury or occupational disease (OD) arises out of and in the course of employment, you must provide written notice to your employer as soon as practicable, but no later than 7 days after the accident or OD. Your employer shall maintain a sufficient supply of the required forms.    Claim for Compensation (Form C-4): If medical treatment is sought, the form C-4 is available at the place of initial treatment. A completed \"Claim for Compensation\" (Form C-4) must be filed within 90 days after an accident or OD. The treating physician or chiropractor must, within 3 working days after treatment, complete and mail to the employer, the employer's insurer and third-party , the Claim for Compensation.    Medical Treatment: If you require medical treatment for your on-the-job injury or OD, you may be required to select a physician or chiropractor from a list provided by your workers’ compensation insurer, if it has contracted with an " Organization for Managed Care (MCO) or Preferred Provider Organization (PPO) or providers of health care. If your employer has not entered into a contract with an MCO or PPO, you may select a physician or chiropractor from the Panel of Physicians and Chiropractors. Any medical costs related to your industrial injury or OD will be paid by your insurer.    Temporary Total Disability (TTD): If your doctor has certified that you are unable to work for a period of at least 5 consecutive days, or 5 cumulative days in a 20-day period, or places restrictions on you that your employer does not accommodate, you may be entitled to TTD compensation.    Temporary Partial Disability (TPD): If the wage you receive upon reemployment is less than the compensation for TTD to which you are entitled, the insurer may be required to pay you TPD compensation to make up the difference. TPD can only be paid for a maximum of 24 months.    Permanent Partial Disability (PPD): When your medical condition is stable and there is an indication of a PPD as a result of your injury or OD, within 30 days, your insurer must arrange for an evaluation by a rating physician or chiropractor to determine the degree of your PPD. The amount of your PPD award depends on the date of injury, the results of the PPD evaluation, your age and wage.    Permanent Total Disability (PTD): If you are medically certified by a treating physician or chiropractor as permanently and totally disabled and have been granted a PTD status by your insurer, you are entitled to receive monthly benefits not to exceed 66 2/3% of your average monthly wage. The amount of your PTD payments is subject to reduction if you previously received a lump-sum PPD award.    Vocational Rehabilitation Services: You may be eligible for vocational rehabilitation services if you are unable to return to the job due to a permanent physical impairment or permanent restrictions as a result of your injury or  occupational disease.    Transportation and Per Ray Reimbursement: You may be eligible for travel expenses and per ray associated with medical treatment.    Reopening: You may be able to reopen your claim if your condition worsens after claim closure.     Appeal Process: If you disagree with a written determination issued by the insurer or the insurer does not respond to your request, you may appeal to the Department of Administration, , by following the instructions contained in your determination letter. You must appeal the determination within 70 days from the date of the determination letter at [address], [address], [place], [place] [address], or [address], [address], [place], [place] [address]. If you disagree with the  decision, you may appeal to the Department of Administration, . You must file your appeal within 30 days from the date of the  decision letter at [address], [address], [place], [place] [address], or [address], [place], [place] [address]. If you disagree with a decision of an , you may file a petition for judicial review with the District Court. You must do so within 30 days of the Appeal Officer’s decision. You may be represented by an  at your own expense or you may contact the [facility] for possible representation.    [place]  for Injured Workers (NAIW): If you disagree with a  decision, you may request that NAIW represent you without charge at an  Hearing. For information regarding denial of benefits, you may contact the [facility] at: [address], [address], [place] [address], [phone], or [address], [place] [address], [phone]    To File a Complaint with the Division: If you wish to file a complaint with the  of the Division of Industrial Relations (DIR),  please  contact the Workers’ Compensation Section, [address], [address], [place], [place] [address], telephone [phone]) [phone], or [address], [place], [place] [address], telephone [phone].    For assistance with Workers’ Compensation Issues: You may contact the [facility] Office for Consumer Health Assistance, [address], [place], [place] [address], Toll Free 1-465.639.8275, Web site: http://[facility].[place].gov/Programs/ROBERTO E-mail: roberto@[facility].nv.gov              __________________________________________________________________                                    _________________            Employee Name / Signature                                                                                                                            Date                                                                                                                                                                                                                              D-2 (rev. 10/20)

## 2022-08-18 NOTE — LETTER
[facility] [id number] [facility] [facility] [address] [address]  Phone:  [phone] - Fax:  [phone]   [facility] Progress Report and Disability Certification  Date of Service: 8/18/2022   No Show:  No  Date / Time of Next Visit: 8/25/2022   Claim Information   Patient Name: Luis Alfredo Javed  Claim Number:     Employer:    Date of Injury: 8/1/2022     Insurer / TPA: [employer]  ID / SSN:     Occupation:   Diagnosis: Diagnoses of Right elbow tendonitis and Repetitive use syndrome were pertinent to this visit.    Medical Information   Related to Industrial Injury? Yes    Subjective Complaints:  Date of injury 8/1/2022: Patient presents for insidious onset right lateral elbow and tricep pain worsening throughout his work weeks.  No acute trauma or injury however patient first noted symptoms at stated date and they seem to be progressing.  Symptoms worsen throughout his workweek and seem to be mildly relieved on the weekend.  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 He has not noted any numbness or tingling.  He has tried gentle stretching and some behavior modification.  No contributory comorbidities and no second job   Objective Findings: Alert nontoxic male in no acute distress.  Right elbow nontender to palpation of the bony prominences, trace tenderness over wrist extensors.  With elbow at 70 degrees and wrist in extension reproduces the patient's pain.  Negative Tinel's and Phalen's.  5 out of 5  strength.  Neurovascularly intact.  10 pound weight limit for right arm.   Pre-Existing Condition(s):     Assessment:   Initial Visit    Status: Additional Care Required  Permanent Disability:No    Plan:      Diagnostics:      Comments:       Disability Information   Status: Released to Restricted  Duty    From:  8/18/2022  Through: 8/25/2022 Restrictions are: Temporary   Physical Restrictions   Sitting:    Standing:    Stooping:    Bending:      Squatting:    Walking:    Climbing:    Pushing:      Pulling:    Other:    Reaching Above Shoulder (L):   Reaching Above Shoulder (R):       Reaching Below Shoulder (L):    Reaching Below Shoulder (R):      Not to exceed Weight Limits   Carrying(hrs):   Weight Limit(lb): < or = to 10 pounds Lifting(hrs):   Weight  Limit(lb):     Comments: 10 pound weight limit for right arm.  Stretching and exercises discussed.  Icing and elbow compression sleeve discussed.  Patient to rest elbow, may take OTC anti-inflammatories and follow-up in 7 days    Repetitive Actions   Hands: i.e. Fine Manipulations from Grasping:     Feet: i.e. Operating Foot Controls:     Driving / Operate Machinery:     Health Care Provider’s Original or Electronic Signature  Meño Park P.A.-C. Health Care Provider’s Original or Electronic Signature    Timbo Kelly MD         Clinic Name / Location: [address] [address] Clinic Phone Number: Dept: [phone]   Appointment Time: 8:45 Am Visit Start Time: 9:05 AM   Check-In Time:  8:47 Am Visit Discharge Time:  9:27AM   Original-Treating Physician or Chiropractor    Page 2-Insurer/TPA    Page 3-Employer    Page 4-Employee

## 2022-08-25 ENCOUNTER — OCCUPATIONAL MEDICINE (OUTPATIENT)
Dept: URGENT CARE | Facility: CLINIC | Age: 42
End: 2022-08-25
Payer: COMMERCIAL

## 2022-08-25 VITALS
DIASTOLIC BLOOD PRESSURE: 70 MMHG | WEIGHT: 315 LBS | OXYGEN SATURATION: 96 % | HEART RATE: 104 BPM | SYSTOLIC BLOOD PRESSURE: 104 MMHG | RESPIRATION RATE: 16 BRPM | HEIGHT: 74 IN | TEMPERATURE: 97.3 F | BODY MASS INDEX: 40.43 KG/M2

## 2022-08-25 DIAGNOSIS — M77.8 RIGHT ELBOW TENDONITIS: ICD-10-CM
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
## 2022-08-25 PROCEDURE — 99213 OFFICE O/P EST LOW 20 MIN: CPT | Performed by: PHYSICIAN ASSISTANT

## 2022-08-25 ASSESSMENT — FIBROSIS 4 INDEX: FIB4 SCORE: 0.86

## 2022-08-25 NOTE — LETTER
"   [facility]  [address] [address]  Phone:  [phone] - Fax:  [phone]   [facility] Progress Report and Disability Certification  Date of Service: 8/25/2022   No Show:  No  Date / Time of Next Visit:     Claim Information   Patient Name: Luis Alfredo Javed  Claim Number:     Employer:   [employer] Date of Injury: 8/1/2022     Insurer / TPA: [employer]  ID / SSN:     Occupation:   Diagnosis: Diagnoses of Right elbow tendonitis and Repetitive use syndrome were pertinent to this visit.    Medical Information   Related to Industrial Injury? Yes    Subjective Complaints:  Initial \"Date of injury 8/1/2022: Patient presents for insidious onset right lateral elbow and tricep pain worsening throughout his work weeks.  No acute trauma or injury however patient first noted symptoms at stated date and they seem to be progressing.  Symptoms worsen throughout his workweek and seem to be mildly relieved on the weekend.  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 He has not noted any numbness or tingling.  He has tried gentle stretching and some behavior modification.  No contributory comorbidities and no second job\"    2nd visit 8/25/22  Patient states he has been able to accommodate by using his left hand when lifting the approximately 14 to 15 pound bottles out of his truck and that has allowed for his right arm/elbow to improved.  He has been wearing a compression sleeve to the right elbow that has also helped.  He would like to be discharged to full duty at this time.   Objective Findings: No significant tenderness to palpation to the elbow no bony tenderness to palpation.  Neurovascular intact distally.  5/5  strength in the upper extremity on the right side.  Patient does have " some pain with gripping.  Patient has full flexion and extension of the right arm.  No significant swelling or deformity of the right elbow.  No erythema or bruising or swelling.   Pre-Existing Condition(s):     Assessment:   Condition Improved    Status: Discharged /  MMI  Permanent Disability:No    Plan:      Diagnostics:      Comments:       Disability Information   Status: Released to Full Duty    From:     Through:   Restrictions are:     Physical Restrictions   Sitting:    Standing:    Stooping:    Bending:      Squatting:    Walking:    Climbing:    Pushing:      Pulling:    Other:    Reaching Above Shoulder (L):   Reaching Above Shoulder (R):       Reaching Below Shoulder (L):    Reaching Below Shoulder (R):      Not to exceed Weight Limits   Carrying(hrs):   Weight Limit(lb):   Lifting(hrs):   Weight  Limit(lb):     Comments: Patient symptoms have improved and he has been tolerating working with mostly using his left upper extremity to perform his duties and this has allowed the right upper extremity to heal with rest.  He will follow-up if needed but can return to work at full duty as he has been.  Discussed with patient to use over-the-counter ibuprofen or Tylenol per 's instructions as well as ice and rest as needed.    Repetitive Actions   Hands: i.e. Fine Manipulations from Grasping:     Feet: i.e. Operating Foot Controls:     Driving / Operate Machinery:     Health Care Provider’s Original or Electronic Signature  Bakari Mota P.A.-C. Health Care Provider’s Original or Electronic Signature    Timbo Kelly MD         Clinic Name / Location: [address] [address] Clinic Phone Number: Dept: [phone]   Appointment Time: 4:00 Pm Visit Start Time: 3:07 PM   Check-In Time:  2:59 Pm Visit Discharge Time:     Original-Treating Physician or Chiropractor    Page 2-Insurer/TPA    Page 3-Employer    Page 4-Employee

## 2022-08-25 NOTE — PROGRESS NOTES
"Subjective     Luis Alfredo Casimiro Javed is a 42 y.o. male who presents with Other (WC FV DOI: 8/1/22 (R) elbow feels a little better, still mild pain )      Initial \"Date of injury 8/1/2022: Patient presents for insidious onset right lateral elbow and tricep pain worsening throughout his work weeks.  No acute trauma or injury however patient first noted symptoms at stated date and they seem to be progressing.  Symptoms worsen throughout his workweek and seem to be mildly relieved on the weekend.  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 He has not noted any numbness or tingling.  He has tried gentle stretching and some behavior modification.  No contributory comorbidities and no second job\"    2nd visit 8/25/22  Patient states he has been able to accommodate by using his left hand when lifting the approximately 14 to 15 pound bottles out of his truck and that has allowed for his right arm/elbow to improved.  He has been wearing a compression sleeve to the right elbow that has also helped.  He would like to be discharged to full duty at this time.     HPI  Patient presents today for follow-up of work-related injury as described above.  Review of Systems   Musculoskeletal:         Right elbow pain improving     PMH: No pertinent past medical history to this problem  MEDS: Medications were reviewed in Epic  ALLERGIES: Allergies were reviewed in Epic  SOCHX: Works as a   FH: No pertinent family history to this problem         Objective     /70 (BP Location: Left arm, Patient Position: Sitting, BP Cuff Size: Large adult)   Pulse (!) 104   Temp 36.3 °C (97.3 °F) (Temporal)   Resp 16   Ht 1.88 m (6' 2\")   Wt (!) 145 kg (319 lb 12.8 oz)   SpO2 96%   BMI 41.06 kg/m²      Physical Exam  Vitals and nursing note reviewed.   Constitutional:       General: " He is not in acute distress.     Appearance: Normal appearance. He is well-developed. He is not ill-appearing or toxic-appearing.   HENT:      Head: Normocephalic and atraumatic.      Right Ear: Hearing normal.      Left Ear: Hearing normal.   Cardiovascular:      Rate and Rhythm: Normal rate.   Pulmonary:      Effort: Pulmonary effort is normal.   Musculoskeletal:      Comments: As described below   Skin:     General: Skin is warm and dry.   Neurological:      Mental Status: He is alert.      Coordination: Coordination normal.   Psychiatric:         Mood and Affect: Mood normal.       No significant tenderness to palpation to the elbow no bony tenderness to palpation.  Neurovascular intact distally.  5/5  strength in the upper extremity on the right side.  Patient does have some pain with gripping.  Patient has full flexion and extension of the right arm.  No significant swelling or deformity of the right elbow.            Assessment & Plan   1. Right elbow tendonitis    2. Repetitive use syndrome  Patient symptoms have improved and he has been tolerating working with mostly using his left upper extremity to perform his duties and this has allowed the right upper extremity to heal with rest.  He will follow-up if needed but can return to work at full duty as he has been.  Discussed with patient to use over-the-counter ibuprofen or Tylenol per 's instructions as well as ice and rest as needed.     Please note that this dictation was created using voice recognition software. I have made every reasonable attempt to correct obvious errors, but I expect that there may be errors of grammar and possibly content that I did not discover before finalizing the note.

## 2022-08-30 ENCOUNTER — OCCUPATIONAL MEDICINE (OUTPATIENT)
Dept: URGENT CARE | Facility: CLINIC | Age: 42
End: 2022-08-30
Payer: COMMERCIAL

## 2022-08-30 VITALS
HEIGHT: 74 IN | BODY MASS INDEX: 40.3 KG/M2 | HEART RATE: 91 BPM | TEMPERATURE: 98.6 F | OXYGEN SATURATION: 99 % | RESPIRATION RATE: 24 BRPM | SYSTOLIC BLOOD PRESSURE: 142 MMHG | DIASTOLIC BLOOD PRESSURE: 80 MMHG | WEIGHT: 314 LBS

## 2022-08-30 DIAGNOSIS — M77.11 EPICONDYLITIS, LATERAL, RIGHT: ICD-10-CM

## 2022-08-30 DIAGNOSIS — M77.01: ICD-10-CM

## 2022-08-30 PROCEDURE — 99214 OFFICE O/P EST MOD 30 MIN: CPT | Performed by: NURSE PRACTITIONER

## 2022-08-30 RX ORDER — NAPROXEN 500 MG/1
TABLET ORAL
Qty: 40 TABLET | Refills: 0 | Status: SHIPPED | OUTPATIENT
Start: 2022-08-30 | End: 2024-02-15

## 2022-08-30 ASSESSMENT — ENCOUNTER SYMPTOMS
SENSORY CHANGE: 0
TINGLING: 0

## 2022-08-30 ASSESSMENT — FIBROSIS 4 INDEX: FIB4 SCORE: 0.86

## 2022-08-30 NOTE — PROGRESS NOTES
"Subjective     Luis Alfredo Casimiro Javed is a 42 y.o. male who presents with Follow-Up (WC FU DOI 8/1/22 R forearm/elbow still causing pain)        Reviewed past medical, surgical and family history. Reviewed prescription and OTC medications with patient in electronic health record today  Allergies: Patient has no known allergies.          HPI  DOI 08/01/22   He lifts bottles with repetitive movements. This weekend he did not feel any improvement in his pain. Yesterday when he was working he was using wire cutters and lifting at work.  Any movement where he has to  something with is right hand increases his pain. He had increased pain in his right elbow. Pain 8-9/10 yesterday. Improved today.  Wears forearm brace.    He was discharged [facility] on 08/25/22 but feels that he has not improved.  He thought he could 'deal' with the pain but yesterday's pain was really bad.   He does not have any additional employers.   No previous right elbow injuries/ pain.   He is right hand dominant    Review of Systems   Musculoskeletal:  Positive for joint pain.   Neurological:  Negative for tingling and sensory change.            Objective     BP (!) 142/80 (BP Location: Left arm, Patient Position: Sitting, BP Cuff Size: Large adult long)   Pulse 91   Temp 37 °C (98.6 °F) (Temporal)   Resp (!) 24   Ht 1.88 m (6' 2\")   Wt (!) 142 kg (314 lb)   SpO2 99%   BMI 40.32 kg/m²      Physical Exam  Vitals and nursing note reviewed.   Constitutional:       Appearance: Normal appearance. He is well-developed and well-groomed.   Cardiovascular:      Rate and Rhythm: Normal rate and regular rhythm.      Pulses: Normal pulses.   Pulmonary:      Effort: Pulmonary effort is normal.   Musculoskeletal:      Right upper arm: Tenderness present. No swelling, edema or bony tenderness.      Right elbow: No swelling, deformity, effusion or lacerations. Normal range of motion. Tenderness present in medial epicondyle and lateral epicondyle.      Right " forearm: Tenderness present. No swelling, edema or bony tenderness.      Comments: Strong equal  strength bilaterally   Neg finkelstein test     Skin:     General: Skin is warm and dry.      Capillary Refill: Capillary refill takes less than 2 seconds.   Neurological:      Mental Status: He is alert and oriented to person, place, and time.      Cranial Nerves: No cranial nerve deficit.      Coordination: Coordination normal.      Deep Tendon Reflexes: Reflexes are normal and symmetric.      Reflex Scores:       Bicep reflexes are 2+ on the right side.       Brachioradialis reflexes are 2+ on the right side.  Psychiatric:         Mood and Affect: Mood normal.         Speech: Speech normal.         Behavior: Behavior normal. Behavior is cooperative.         Thought Content: Thought content normal.                           Assessment & Plan        1. Epicondylitis, medial humeral, right    - naproxen (NAPROSYN) 500 MG Tab; Take 1 tab PO BID x 7 -10 days with food. Then 1 tablet PO BID prn pain  Dispense: 40 Tablet; Refill: 0  - Referral to Occupational Medicine  - Referral to Sports Medicine    2. Epicondylitis, lateral, right    - naproxen (NAPROSYN) 500 MG Tab; Take 1 tab PO BID x 7 -10 days with food. Then 1 tablet PO BID prn pain  Dispense: 40 Tablet; Refill: 0  - Referral to Occupational Medicine  - Referral to Sports Medicine     See [place] D39 form  REOPENED his previous work comp injury from recent discharge [facility] on 08/5/22. This is not a new injury.      Educated in proper administration of medication(s) ordered today including safety, possible SE, risks, benefits, rationale and alternatives to therapy.        I have spent  32 minutes on the care of Luis Alfredo Javed for this occupational health visit.   This includes preparing for the urgent care visit. This time includes review of previous visits/ documents, obtaining HPI, examination and evaluation of patient, ordering and interpretation of labs, imaging,  tests, medical management, counseling, education and documentation.

## 2022-08-30 NOTE — LETTER
[facility] [place] [address] [address] [address]  Phone:  [phone] - Fax:  [phone]   [facility] Progress Report and Disability Certification  Date of Service: 8/30/2022   No Show:  No  Date / Time of Next Visit: 9/9/2022 TRANSFER CARE to occupational medicine and sports medicine.    Claim Information   Patient Name: Luis Alfredo Javed  Claim Number:     Employer:    Date of Injury: 8/1/2022     Insurer / TPA: [employer]  ID / SSN:     Occupation:   Diagnosis: Diagnoses of Epicondylitis, medial humeral, right and Epicondylitis, lateral, right were pertinent to this visit.    Medical Information   Related to Industrial Injury?      Subjective Complaints:  VINNIE 08/01/22   He lifts bottles with repetitive movements. This weekend he did not feel any improvement in his pain. Yesterday when he was working he was using wire cutters and lifting at work.  Any movement where he has to  something with is right hand increases his pain. He had increased pain in his right elbow. Pain 8-9/10 yesterday. Improved today.  Wears forearm brace.    He was discharged [facility] on 08/25/22 but feels that he has not improved.  He thought he could 'deal' with the pain but yesterday's pain was really bad.   He does not have any additional employers.   No previous right elbow injuries/ pain.   He is right hand dominant   Objective Findings: Physical Exam  Vitals and nursing note reviewed.   Constitutional:       Appearance: Normal appearance. He is well-developed and well-groomed.   Cardiovascular:      Rate and Rhythm: Normal rate and regular rhythm.      Pulses: Normal pulses.   Pulmonary:      Effort: Pulmonary effort is normal.   Musculoskeletal:      Right upper arm: Tenderness present. No swelling, edema or bony tenderness.      Right elbow: No swelling, deformity, effusion or lacerations. Normal range of motion. Tenderness present in medial epicondyle and  lateral epicondyle.      Right forearm: Tenderness present. No swelling, edema or bony tenderness.      Comments: Strong equal  strength bilaterally   Neg finkelstein test     Skin:     General: Skin is warm and dry.      Capillary Refill: Capillary refill takes less than 2 seconds.   Neurological:      Mental Status: He is alert and oriented to person, place, and time.      Cranial Nerves: No cranial nerve deficit.      Coordination: Coordination normal.      Deep Tendon Reflexes: Reflexes are normal and symmetric.      Reflex Scores:       Bicep reflexes are 2+ on the right side.       Brachioradialis reflexes are 2+ on the right side.  Psychiatric:         Mood and Affect: Mood normal.         Speech: Speech normal.         Behavior: Behavior normal. Behavior is cooperative.         Thought Content: Thought content normal.      Pre-Existing Condition(s):     Assessment:   Condition Worsened    Status: Additional Care Required  Permanent Disability:No    Plan: MedicationTransfer Care  Comments:Referral sports medicine, Referral to occupational medicine    Diagnostics:      Comments:       Disability Information   Status: Released to Restricted Duty    From:  8/30/2022  Through: 9/9/2022 Restrictions are: Temporary   Physical Restrictions   Sitting:    Standing:    Stooping:    Bending:      Squatting:    Walking:    Climbing:    Pushing:      Pulling:    Other:    Reaching Above Shoulder (L):   Reaching Above Shoulder (R):       Reaching Below Shoulder (L):    Reaching Below Shoulder (R):      Not to exceed Weight Limits   Carrying(hrs):   Weight Limit(lb): < or = to 10 pounds  Comments:right hand/ forearm/ elbow Lifting(hrs):   Weight  Limit(lb): < or = to 10 pounds  Comments:right hand/ forearm/ elbow   Comments: No repetitive movements with right elbow/ forearm.   Start Naprosyn - RX sent to pharmacy- take as directed.   Tennis elbow brace prn pain   Ice/ heat packs prn pain  OTC analgesic gel/ cream prn  pain     Repetitive Actions   Hands: i.e. Fine Manipulations from Grasping: < or = to 2 hrs/day  Comments:right hand / forearm/ elbow   Feet: i.e. Operating Foot Controls:     Driving / Operate Machinery:     Health Care Provider’s Original or Electronic Signature  SUSAN MartinPGUZMAN. Health Care Provider’s Original or Electronic Signature    Timbo Kelly MD         Clinic Name / Location: [place] [address]  [place] [address] Clinic Phone Number: Dept: [phone]   Appointment Time: 10:00 Am Visit Start Time: 9:55 AM   Check-In Time:  9:50 Am Visit Discharge Time:  10:33AM   Original-Treating Physician or Chiropractor    Page 2-Insurer/TPA    Page 3-Employer    Page 4-Employee

## 2022-09-09 ENCOUNTER — APPOINTMENT (OUTPATIENT)
Dept: URGENT CARE | Facility: CLINIC | Age: 42
End: 2022-09-09

## 2022-09-09 DIAGNOSIS — M77.01: ICD-10-CM

## 2022-09-09 DIAGNOSIS — M77.11 EPICONDYLITIS, LATERAL, RIGHT: ICD-10-CM

## 2022-11-01 RX ORDER — ATORVASTATIN CALCIUM 40 MG/1
TABLET, FILM COATED ORAL
Qty: 30 TABLET | Refills: 4 | Status: SHIPPED | OUTPATIENT
Start: 2022-11-01

## 2022-12-09 NOTE — TELEPHONE ENCOUNTER
Was the patient seen in the last year in this department? Yes     Does patient have an active prescription for medications requested? No     Received Request Via: Pharmacy  
Single

## 2023-06-06 ENCOUNTER — OCCUPATIONAL MEDICINE (OUTPATIENT)
Dept: URGENT CARE | Facility: CLINIC | Age: 43
End: 2023-06-06
Payer: COMMERCIAL

## 2023-06-06 VITALS
DIASTOLIC BLOOD PRESSURE: 76 MMHG | OXYGEN SATURATION: 94 % | WEIGHT: 305 LBS | SYSTOLIC BLOOD PRESSURE: 126 MMHG | RESPIRATION RATE: 15 BRPM | BODY MASS INDEX: 39.14 KG/M2 | HEART RATE: 97 BPM | TEMPERATURE: 97.8 F | HEIGHT: 74 IN

## 2023-06-06 DIAGNOSIS — S46.912A STRAIN OF LEFT SHOULDER, INITIAL ENCOUNTER: ICD-10-CM

## 2023-06-06 PROCEDURE — 3074F SYST BP LT 130 MM HG: CPT | Performed by: PHYSICIAN ASSISTANT

## 2023-06-06 PROCEDURE — 3078F DIAST BP <80 MM HG: CPT | Performed by: PHYSICIAN ASSISTANT

## 2023-06-06 PROCEDURE — 99213 OFFICE O/P EST LOW 20 MIN: CPT | Performed by: PHYSICIAN ASSISTANT

## 2023-06-06 ASSESSMENT — ENCOUNTER SYMPTOMS
EYE DISCHARGE: 0
SHORTNESS OF BREATH: 0
FEVER: 0
EYE REDNESS: 0

## 2023-06-06 NOTE — LETTER
"EMPLOYEE’S CLAIM FOR COMPENSATION/ REPORT OF INITIAL TREATMENT  FORM C-4  PLEASE TYPE OR PRINT    EMPLOYEE’S CLAIM - PROVIDE ALL INFORMATION REQUESTED   First Name  Luis Alfredo Last Name  Javed Birthdate                    1980                Sex  male Claim Number (Insurer’s Use Only)   Home Address  [address] Age  43 y.o. Height  1.88 m (6' 2\") Weight  (!) 138 kg (305 lb) N     [facility] Zip  [address] Telephone  [phone] (home)    Mailing Address  [address] [facility] Zip  [address] Primary Language Spoken  English    INSURER   THIRD-PARTY     Jesenia Gonzalez   Employee's Occupation (Job Title) When Injury or Occupational Disease Occurred   Rep    Employer's Name/Company Name    [employer]      Office Mail Address (Number and Street)  [address]        Date of Injury  6/5/2023               Hours Injury  9:30 AM Date Employer Notified  6/6/2023 Last Day of Work after Injury or Occupational Disease  6/5/2023 Supervisor to Whom Injury     Reported  Prem Rhoades   Address or Location of Accident (if applicable)  Work [1]   What were you doing at the time of accident? (if applicable)  MOVING A     How did this injury or occupational disease occur? (Be specific and answer in detail. Use additional sheet if necessary)  MOVING A , MY  SLIPPED   If you believe that you have an occupational disease, when did you first have knowledge of the disability and its relationship to your employment?  N/A Witnesses to the Accident (if applicable)  TISH CALVO      Nature of Injury or Occupational Disease  Sprain  Part(s) of Body Injured or Affected  Shoulder (R), N/A, N/A    I CERTIFY THAT THE ABOVE IS TRUE AND CORRECT TO T HE BEST OF MY KNOWLEDGE AND THAT I HAVE PROVIDED THIS INFORMATION IN ORDER TO OBTAIN THE BENEFITS OF [place], OR [place]).  I HEREBY AUTHORIZE ANY PHYSICIAN, CHIROPRACTOR, SURGEON, PRACTITIONER OR ANY OTHER PERSON, ANY HOSPITAL, INCLUDING [facility] OR [facility], ANY  MEDICAL SERVICE ORGANIZATION, ANY INSURANCE COMPANY, OR OTHER INSTITUTION OR ORGANIZATION TO RELEASE TO EACH OTHER, ANY MEDICAL OR OTHER INFORMATION, INCLUDING BENEFITS PAID OR PAYABLE, PERTINENT TO THIS INJURY OR DISEASE, EXCEPT INFORMATION RELATIVE TO DIAGNOSIS, TREATMENT AND/OR COUNSELING FOR AIDS, PSYCHOLOGICAL CONDITIONS, ALCOHOL OR CONTROLLED SUBSTANCES, FOR WHICH I MUST GIVE SPECIFIC AUTHORIZATION.  A PHOTOSTAT OF THIS AUTHORIZATION SHALL BE VALID AS THE ORIGINAL.       Date 6/6/2023   [facility]  Employee’s Original or  *Electronic Signature   THIS REPORT MUST BE COMPLETED AND MAILED WITHIN 3 WORKING DAYS OF TREATMENT   [facility]  Name of [facility]   Date  6/6/2023 Diagnosis and Description of Injury or Occupational Disease  (S46.912A) Strain of left shoulder, initial encounter Is there evidence that the injured employee was under the influence of alcohol and/or another controlled substance at the time of accident?  ? No ? Yes (if yes, please explain)   Hour  3:06 PM   The encounter diagnosis was Strain of left shoulder, initial encounter. No   Treatment    Plan:  Full Duty without Restrictions - D39 and C4 provided   OTC NSAIDs for pain/discomfort  Apply ice and or heat to the affected area for symptomatic relief  Monitor for worsening signs or symptoms  Follow-up in 1 week for reassessment  Return to clinic or go to the ED if symptoms worsen or fail to improve, or if the patient should develop worsening/increasing/persistent shoulder pain, radiation of pain, numbness, tingling, weakness, swelling, bruising, increased redness or warmth, decreased range of motion, chest pain, shortness of  breath, fever/chills, and/or any concerning symptoms.  Have you advised the patient to remain off work five days or     more?    X-Ray Findings      ? Yes Indicate dates:   From   To      From information given by the employee, together with medical evidence, can        you directly connect this injury or occupational disease as job incurred?  Yes ? No If no, is the injured employee capable of:  ? full duty  Yes ? modified duty      Is additional medical care by a physician indicated?  Yes If modified duty, specify any limitations / restrictions      Do you know of any previous injury or disease contributing to this condition or occupational disease?  ? Yes ? No (Explain if yes)                          No   Date  6/6/2023 Print Health Care Provider's  Name  Radha Archer P.A.-C. I certify that the employer’s copy of  this form was delivered to the employer on:   Address  [address] [place] [address] Insurer’s Use Only     [facility] Zip  [address]    Provider’s Tax ID Number  [id number] Telephone  Dept: [phone]             Health Care Provider’s Original or Electronic Signature  e-RADHA Panda P.A.-C. Degree (MD,DO, DC,PASHYAM,APRN)  PASHYAM      * Complete and attach Release of Information (Form C-4A) when injured employee signs C-4 Form electronically  ORIGINAL - TREATING HEALTHCARE PROVIDER PAGE 2 - INSURER/TPA PAGE 3 - EMPLOYER PAGE 4 - EMPLOYEE             Form C-4 (rev.08/21)           BRIEF DESCRIPTION OF RIGHTS AND BENEFITS  (Pursuant to [place] 616C.050)    Notice of Injury or Occupational Disease (Incident Report Form C-1): If an injury or occupational disease (OD) arises out of and in the course of employment, you must provide written notice to your employer as soon as practicable, but no later than 7 days after the accident or OD. Your employer shall maintain a sufficient supply of the required forms.    Claim for Compensation (Form C-4): If medical treatment is sought, the form C-4 is available at  "the place of initial treatment. A completed \"Claim for Compensation\" (Form C-4) must be filed within 90 days after an accident or OD. The treating physician or chiropractor must, within 3 working days after treatment, complete and mail to the employer, the employer's insurer and third-party , the Claim for Compensation.    Medical Treatment: If you require medical treatment for your on-the-job injury or OD, you may be required to select a physician or chiropractor from a list provided by your workers’ compensation insurer, if it has contracted with an Organization for Managed Care (MCO) or Preferred Provider Organization (PPO) or providers of health care. If your employer has not entered into a contract with an MCO or PPO, you may select a physician or chiropractor from the Panel of Physicians and Chiropractors. Any medical costs related to your industrial injury or OD will be paid by your insurer.    Temporary Total Disability (TTD): If your doctor has certified that you are unable to work for a period of at least 5 consecutive days, or 5 cumulative days in a 20-day period, or places restrictions on you that your employer does not accommodate, you may be entitled to TTD compensation.    Temporary Partial Disability (TPD): If the wage you receive upon reemployment is less than the compensation for TTD to which you are entitled, the insurer may be required to pay you TPD compensation to make up the difference. TPD can only be paid for a maximum of 24 months.    Permanent Partial Disability (PPD): When your medical condition is stable and there is an indication of a PPD as a result of your injury or OD, within 30 days, your insurer must arrange for an evaluation by a rating physician or chiropractor to determine the degree of your PPD. The amount of your PPD award depends on the date of injury, the results of the PPD evaluation, your age and wage.    Permanent Total Disability (PTD): If you are medically " certified by a treating physician or chiropractor as permanently and totally disabled and have been granted a PTD status by your insurer, you are entitled to receive monthly benefits not to exceed 66 2/3% of your average monthly wage. The amount of your PTD payments is subject to reduction if you previously received a lump-sum PPD award.    Vocational Rehabilitation Services: You may be eligible for vocational rehabilitation services if you are unable to return to the job due to a permanent physical impairment or permanent restrictions as a result of your injury or occupational disease.    Transportation and Per Ray Reimbursement: You may be eligible for travel expenses and per ray associated with medical treatment.    Reopening: You may be able to reopen your claim if your condition worsens after claim closure.     Appeal Process: If you disagree with a written determination issued by the insurer or the insurer does not respond to your request, you may appeal to the Department of Administration, , by following the instructions contained in your determination letter. You must appeal the determination within 70 days from the date of the determination letter at [address], [address], [place], [place] [address], or [address], [address], [place] [address]. If you disagree with the  decision, you may appeal to the Department of Administration, . You must file your appeal within 30 days from the date of the  decision letter at [address], [address], [place], [place] [address], or [address], [address], [place] [address]. If you disagree with a decision of an , you may file a petition for judicial review with the District Court. You must do so within 30 days of the Appeal Officer’s decision. You may be represented by an  at your own expense or you may contact the [facility] for possible  representation.    [place]  for Injured Workers (NAIW): If you disagree with a  decision, you may request that NAIW represent you without charge at an  Hearing. For information regarding denial of benefits, you may contact the NAIW at: [address], [address], [place] [address], ([phone]) [phone], or [address], [address], [place] [address], [phone]    To File a Complaint with the Division: If you wish to file a complaint with the  of the Division of Industrial Relations (DIR),  please contact the Workers’ Compensation Section, [address], [address], [place], [place] [address], telephone (146) [phone], or [address], [address], [place], [place] [address], telephone ([phone]) [phone].    For assistance with Workers’ Compensation Issues: You may contact the [facility] Office for Consumer Health Assistance, [address], [address], [place], [place] [address], Toll Free 1-535.528.3448, Web site: http://[facility].[place].gov/Programs/SOCORRO E-mail: socorro@[facility].nv.[facility]              __________________________________________________________________                                    _________________            Employee Name / Signature                                                                                                                            Date                                                                                                                                                                                                                              D-2 (rev. 10/20)

## 2023-06-06 NOTE — PROGRESS NOTES
"May Javed is a 43 y.o. male who presents with Shoulder Injury and Work-Related Injury            HPI    The patient presents to clinic for initial Workmen's Comp. evaluation.    DOI: 6/5/2023 -the patient states he carrying a  with a coworker when the  started to slip.  The patient states he attempted to catch the  as it fell.  The patient states he developed subsequent pain to his left shoulder.  The patient states this pain has continued to progress.  The patient states he is experiencing decreased range of motion of the left shoulder secondary to pain.  The patient states he is also experiencing increased pain with movement and use of the left upper extremity.  The patient reports no associated swelling, bruising, or redness.  He also reports no radiation of pain down the left upper extremity.  No numbness, tingling, weakness.  The patient denies chest pain and shortness of breath.  The patient has not taken any OTC medications for his current symptoms.  The patient reports no prior injury to his left shoulder.  He does not work a second job.  The patient is right-handed.      PMH: Reviewed in Epic, no pertinent findings.  MEDS: Reviewed in Epic.  ALLERGIES: Reviewed in Epic.  SURGHX: Reviewed in Epic.  SOCHX: Reviewed in Epic.  FH: Family history was reviewed, no pertinent findings to report.      Review of Systems   Constitutional:  Negative for fever.   Eyes:  Negative for discharge and redness.   Respiratory:  Negative for shortness of breath.    Cardiovascular:  Negative for chest pain.   Musculoskeletal:  Positive for joint pain.              Objective     /76 (BP Location: Right arm, Patient Position: Sitting, BP Cuff Size: Adult long)   Pulse 97   Temp 36.6 °C (97.8 °F) (Temporal)   Resp 15   Ht 1.88 m (6' 2\")   Wt (!) 138 kg (305 lb)   SpO2 94%   BMI 39.16 kg/m²      Physical Exam  Constitutional:       General: He is not in acute " distress.     Appearance: Normal appearance. He is well-developed. He is not ill-appearing.   HENT:      Head: Normocephalic and atraumatic.      Right Ear: External ear normal.      Left Ear: External ear normal.   Eyes:      Extraocular Movements: Extraocular movements intact.      Conjunctiva/sclera: Conjunctivae normal.   Cardiovascular:      Rate and Rhythm: Normal rate.   Pulmonary:      Effort: Pulmonary effort is normal.   Musculoskeletal:      Cervical back: Normal range of motion and neck supple.      Comments:   Left Shoulder:  No tenderness to palpation.  No bony tenderness.  No obvious deformity.  No edema.  No ecchymosis.  No feeling erythema.  No increased warmth.  Decreased ROM -the patient demonstrates limited range of motion  Neurovascular intact distally  Strength 5/5 -equal bilateral upper extremities   strength 5/5  Intrinsic muscles intact   Skin:     General: Skin is warm and dry.   Neurological:      Mental Status: He is alert and oriented to person, place, and time.                             Assessment & Plan          1. Strain of left shoulder, initial encounter    Differential diagnoses, supportive care, and indications for immediate follow-up discussed with patient.   Instructed to return to clinic or nearest emergency department for any change in condition, further concerns, or worsening of symptoms.    Plan:  Full Duty without Restrictions - D39 and C4 provided   OTC NSAIDs for pain/discomfort  Apply ice and or heat to the affected area for symptomatic relief  Monitor for worsening signs or symptoms  Follow-up in 1 week for reassessment  Return to clinic or go to the ED if symptoms worsen or fail to improve, or if the patient should develop worsening/increasing/persistent shoulder pain, radiation of pain, numbness, tingling, weakness, swelling, bruising, increased redness or warmth, decreased range of motion, chest pain, shortness of breath, fever/chills, and/or any concerning  symptoms.    Discussed plan with the patient, and he agrees to the above.    I personally reviewed prior external notes and test results pertinent to today's visit.  I have independently reviewed and interpreted all diagnostics ordered during this urgent care visit.     Please note that this dictation was created using voice recognition software. I have made every reasonable attempt to correct obvious errors, but I expect that there may be errors of grammar and possibly content that I did not discover before finalizing the note.       This note was electronically signed by Sara Archer PA-C

## 2023-06-06 NOTE — LETTER
[address] [place] [address]  Phone:  [phone] - Fax:  [phone]   [facility] Progress Report and Disability Certification  Date of Service: 6/6/2023   No Show:  No  Date / Time of Next Visit: 6/13/2023 @ 3:00 PM    Claim Information   Patient Name: Luis Alfredo Javed  Claim Number:     Employer:   [employer] Date of Injury: 6/5/2023     Insurer / TPA: Jesenia Gonzalez  ID / SSN:     Occupation:  Rep  Diagnosis: The encounter diagnosis was Strain of left shoulder, initial encounter.    Medical Information   Related to Industrial Injury? Yes    Subjective Complaints:    The patient presents to clinic for initial Workmen's Comp. evaluation.    DOI: 6/5/2023 -the patient states he carrying a  with a coworker when the  started to slip.  The patient states he attempted to catch the  as it fell.  The patient states he developed subsequent pain to his left shoulder.  The patient states this pain has continued to progress.  The patient states he is experiencing decreased range of motion of the left shoulder secondary to pain.  The patient states he is also experiencing increased pain with movement and use of the left upper extremity.  The patient reports no associated swelling, bruising, or redness.  He also reports no radiation of pain down the left upper extremity.  No numbness, tingling, weakness.  The patient denies chest pain and shortness of breath.  The patient has not taken any OTC medications for his current symptoms.  The patient reports no prior injury to his left shoulder.  He does not work a second job.  The patient is right-handed.   Objective Findings:   Left Shoulder:  No tenderness to palpation.  No bony tenderness.  No obvious deformity.  No edema.  No ecchymosis.  No feeling erythema.  No increased warmth.  Decreased ROM -the patient demonstrates limited range of motion  Neurovascular  intact distally  Strength 5/5 -equal bilateral upper extremities   strength 5/5  Intrinsic muscles intact    Pre-Existing Condition(s):     Assessment:   Initial Visit    Status: Additional Care Required  Permanent Disability:     Plan: Medication    Diagnostics:      Comments:       Disability Information   Status: Released to Full Duty    From:  6/6/2023  Through: 6/13/2023 Restrictions are: Temporary   Physical Restrictions   Sitting:    Standing:    Stooping:    Bending:      Squatting:    Walking:    Climbing:    Pushing:      Pulling:    Other:    Reaching Above Shoulder (L):   Reaching Above Shoulder (R):       Reaching Below Shoulder (L):    Reaching Below Shoulder (R):      Not to exceed Weight Limits   Carrying(hrs):   Weight Limit(lb):   Lifting(hrs):   Weight  Limit(lb):     Comments:   Plan:  Full Duty without Restrictions - D39 and C4 provided   OTC NSAIDs for pain/discomfort  Apply ice and or heat to the affected area for symptomatic relief  Monitor for worsening signs or symptoms  Follow-up in 1 week for reassessment  Return to clinic or go to the ED if symptoms worsen or fail to improve, or if the patient should develop worsening/increasing/persistent shoulder pain, radiation of pain, numbness, tingling, weakness, swelling, bruising, increased redness or warmth, decreased range of motion, chest pain, shortness of breath, fever/chills, and/or any concerning symptoms.    Repetitive Actions   Hands: i.e. Fine Manipulations from Grasping:     Feet: i.e. Operating Foot Controls:     Driving / Operate Machinery:     Health Care Provider’s Original or Electronic Signature  Sara Archer P.A.-C. Health Care Provider’s Original or Electronic Signature    Humble Thorne DO MPH     Clinic Name / Location: [place] [address]  [place] [address] Clinic Phone Number: Dept: [phone]   Appointment Time: 1:45 Pm Visit Start Time: 3:06 PM   Check-In Time:  1:51 Pm Visit Discharge  Time:  3:39 PM    Original-Treating Physician or Chiropractor    Page 2-Insurer/TPA    Page 3-Employer    Page 4-Employee

## 2023-06-13 ENCOUNTER — OCCUPATIONAL MEDICINE (OUTPATIENT)
Dept: URGENT CARE | Facility: CLINIC | Age: 43
End: 2023-06-13
Payer: COMMERCIAL

## 2023-06-13 VITALS
OXYGEN SATURATION: 96 % | TEMPERATURE: 98 F | HEIGHT: 74 IN | WEIGHT: 305 LBS | BODY MASS INDEX: 39.14 KG/M2 | RESPIRATION RATE: 14 BRPM | SYSTOLIC BLOOD PRESSURE: 128 MMHG | DIASTOLIC BLOOD PRESSURE: 80 MMHG | HEART RATE: 96 BPM

## 2023-06-13 DIAGNOSIS — S46.912D STRAIN OF LEFT SHOULDER, SUBSEQUENT ENCOUNTER: ICD-10-CM

## 2023-06-13 PROCEDURE — 99213 OFFICE O/P EST LOW 20 MIN: CPT | Performed by: NURSE PRACTITIONER

## 2023-06-13 PROCEDURE — 3074F SYST BP LT 130 MM HG: CPT | Performed by: NURSE PRACTITIONER

## 2023-06-13 PROCEDURE — 3079F DIAST BP 80-89 MM HG: CPT | Performed by: NURSE PRACTITIONER

## 2023-06-13 ASSESSMENT — ENCOUNTER SYMPTOMS: MUSCULOSKELETAL NEGATIVE: 1

## 2023-06-13 ASSESSMENT — VISUAL ACUITY: OU: 1

## 2023-06-13 NOTE — PROGRESS NOTES
"Subjective:     Luis Alfredo Javed is a 43 y.o. male who presents for Work-Related Injury ( [facility] DOI: 06-05-23 L SHOULDER INJURY)    Initial UC visit 6/13/2023 reviewed for continuity of care:    \"DOI: 6/5/2023 -the patient states he carrying a  with a coworker when the  started to slip.  The patient states he attempted to catch the  as it fell.  The patient states he developed subsequent pain to his left shoulder.  The patient states this pain has continued to progress.  The patient states he is experiencing decreased range of motion of the left shoulder secondary to pain.  The patient states he is also experiencing increased pain with movement and use of the left upper extremity.  The patient reports no associated swelling, bruising, or redness.  He also reports no radiation of pain down the left upper extremity.  No numbness, tingling, weakness.  The patient denies chest pain and shortness of breath.  The patient has not taken any OTC medications for his current symptoms.  The patient reports no prior injury to his left shoulder.  He does not work a second job.  The patient is right-handed.\"    Dx: L shoulder strain. Tx: NSAIDs, ice/heat, plan follow up.    Follow-up UC visit today 6/13/2023:    Symptoms resolved.  Patient denies left shoulder pain.  Has full range of motion.  Ready to be discharged MMI.    Review of Systems   Musculoskeletal: Negative.    All other systems reviewed and are negative.    Refer to HPI for additional details.    During this visit, appropriate PPE was worn, hand hygiene was performed, and the patient and any visitors were masked.    PMH: No pertinent past medical history to this problem  MEDS: Medications were reviewed in Epic  ALLERGIES: Allergies were reviewed in Epic  SOCHX: Works as an  rep at [employer]   FH: No pertinent family history to this problem      Objective:     /80   Pulse 96   Temp 36.7 °C (98 °F) (Temporal)   Resp " "14   Ht 1.88 m (6' 2\")   Wt (!) 138 kg (305 lb)   SpO2 96%   BMI 39.16 kg/m²     Physical Exam  Nursing note reviewed.   Constitutional:       General: He is not in acute distress.     Appearance: He is well-developed. He is not ill-appearing or toxic-appearing.   Eyes:      General: Vision grossly intact.   Cardiovascular:      Rate and Rhythm: Normal rate.   Pulmonary:      Effort: Pulmonary effort is normal. No respiratory distress.   Musculoskeletal:         General: No deformity. Normal range of motion.      Left shoulder: No swelling, deformity, laceration or tenderness. Normal range of motion. Normal strength.   Skin:     General: Skin is warm and dry.      Coloration: Skin is not pale.      Findings: No bruising, erythema or rash.   Neurological:      Mental Status: He is alert and oriented to person, place, and time.      Motor: No weakness.      Gait: Gait normal.   Psychiatric:         Behavior: Behavior normal. Behavior is cooperative.       Assessment/Plan:     1. Strain of left shoulder, subsequent encounter    Discharged MMI. Full duty. Seek immediate medical attention if symptoms change/worsen.    Differential diagnosis, natural history, supportive care, over-the-counter symptom management per 's instructions, close monitoring, and indications for immediate follow-up discussed.     All questions answered. Patient agrees with the plan of care.    "

## 2023-06-13 NOTE — LETTER
"   [facility]  [address] [facility] [address] [address] [place] [address]  Phone:  [phone] - Fax:  [phone]   [facility] Progress Report and Disability Certification  Date of Service: 6/13/2023   No Show:  No  Date / Time of Next Visit:     Claim Information   Patient Name: Luis Alfredo Javed  Claim Number:     Employer:   [employer]  Date of Injury: 6/5/2023     Insurer / TPA: Jesenia Gonzalez  ID / SSN:     Occupation:  Rep  Diagnosis: The encounter diagnosis was Strain of left shoulder, subsequent encounter.    Medical Information   Related to Industrial Injury? Yes    Subjective Complaints:  Initial UC visit 6/13/2023 reviewed for continuity of care:    \"DOI: 6/5/2023 -the patient states he carrying a  with a coworker when the  started to slip.  The patient states he attempted to catch the  as it fell.  The patient states he developed subsequent pain to his left shoulder.  The patient states this pain has continued to progress.  The patient states he is experiencing decreased range of motion of the left shoulder secondary to pain.  The patient states he is also experiencing increased pain with movement and use of the left upper extremity.  The patient reports no associated swelling, bruising, or redness.  He also reports no radiation of pain down the left upper extremity.  No numbness, tingling, weakness.  The patient denies chest pain and shortness of breath.  The patient has not taken any OTC medications for his current symptoms.  The patient reports no prior injury to his left shoulder.  He does not work a second job.  The patient is right-handed.\"    Dx: L shoulder strain. Tx: NSAIDs, ice/heat, plan follow up.    Follow-up UC visit today 6/13/2023:    Symptoms resolved.  Patient denies left shoulder pain.  Has full range of motion.  Ready to be discharged MMI.   Objective Findings: Constitutional:       General: He is not in " acute distress.     Appearance: He is well-developed. He is not ill-appearing or toxic-appearing.   Musculoskeletal:         General: No deformity. Normal range of motion.      Left shoulder: No swelling, deformity, laceration or tenderness. Normal range of motion. Normal strength.   Skin:     General: Skin is warm and dry.      Coloration: Skin is not pale.      Findings: No bruising, erythema or rash.   Neurological:      Mental Status: He is alert and oriented to person, place, and time.      Motor: No weakness.    Pre-Existing Condition(s):     Assessment:   Condition Improved    Status: Discharged /  MMI  Permanent Disability:No    Plan:      Diagnostics:      Comments:  Discharged MMI. Full duty. Seek immediate medical attention if symptoms change/worsen.    Disability Information   Status: Released to Full Duty    From:  6/13/2023  Through:   Restrictions are:     Physical Restrictions   Sitting:    Standing:    Stooping:    Bending:      Squatting:    Walking:    Climbing:    Pushing:      Pulling:    Other:    Reaching Above Shoulder (L):   Reaching Above Shoulder (R):       Reaching Below Shoulder (L):    Reaching Below Shoulder (R):      Not to exceed Weight Limits   Carrying(hrs):   Weight Limit(lb):   Lifting(hrs):   Weight  Limit(lb):     Comments:      Repetitive Actions   Hands: i.e. Fine Manipulations from Grasping:     Feet: i.e. Operating Foot Controls:     Driving / Operate Machinery:     Health Care Provider’s Original or Electronic Signature  FARRAH Yi. Health Care Provider’s Original or Electronic Signature    Humble Thorne DO MPH     Clinic Name / Location: [place] [address]  [place] [address] Clinic Phone Number: Dept: [phone]   Appointment Time: 3:00 Pm Visit Start Time: 3:01 PM   Check-In Time:  2:58 Pm Visit Discharge Time: 3:51 PM    Original-Treating Physician or Chiropractor    Page 2-Insurer/TPA    Page 3-Employer    Page  4-Employee

## 2024-02-15 PROBLEM — S43.431A LABRAL TEAR OF SHOULDER, RIGHT, INITIAL ENCOUNTER: Status: ACTIVE | Noted: 2024-02-15

## 2024-02-15 PROBLEM — M75.121 COMPLETE ROTATOR CUFF TEAR OR RUPTURE OF RIGHT SHOULDER, NOT SPECIFIED AS TRAUMATIC: Status: ACTIVE | Noted: 2024-02-15

## 2024-02-15 PROBLEM — M75.41 SUBACROMIAL IMPINGEMENT OF RIGHT SHOULDER: Status: ACTIVE | Noted: 2024-02-15

## 2024-05-28 ENCOUNTER — APPOINTMENT (OUTPATIENT)
Dept: URGENT CARE | Facility: PHYSICIAN GROUP | Age: 44
End: 2024-05-28
Payer: COMMERCIAL

## 2024-08-20 ENCOUNTER — OFFICE VISIT (OUTPATIENT)
Dept: URGENT CARE | Facility: PHYSICIAN GROUP | Age: 44
End: 2024-08-20
Payer: COMMERCIAL

## 2024-08-20 VITALS
RESPIRATION RATE: 12 BRPM | OXYGEN SATURATION: 96 % | HEART RATE: 94 BPM | WEIGHT: 271.8 LBS | HEIGHT: 74 IN | DIASTOLIC BLOOD PRESSURE: 80 MMHG | SYSTOLIC BLOOD PRESSURE: 98 MMHG | BODY MASS INDEX: 34.88 KG/M2 | TEMPERATURE: 98.2 F

## 2024-08-20 DIAGNOSIS — S80.11XA HEMATOMA OF RIGHT LOWER EXTREMITY, INITIAL ENCOUNTER: ICD-10-CM

## 2024-08-20 PROCEDURE — 3074F SYST BP LT 130 MM HG: CPT | Performed by: NURSE PRACTITIONER

## 2024-08-20 PROCEDURE — 99213 OFFICE O/P EST LOW 20 MIN: CPT | Performed by: NURSE PRACTITIONER

## 2024-08-20 PROCEDURE — 3078F DIAST BP <80 MM HG: CPT | Performed by: NURSE PRACTITIONER

## 2024-08-20 RX ORDER — EMPAGLIFLOZIN 10 MG/1
TABLET, FILM COATED ORAL
COMMUNITY

## 2024-08-20 RX ORDER — DIAZEPAM 2 MG
TABLET ORAL
COMMUNITY
End: 2024-08-20

## 2024-08-20 RX ORDER — ATORVASTATIN CALCIUM 80 MG/1
1 TABLET, FILM COATED ORAL
COMMUNITY

## 2024-08-20 RX ORDER — LISINOPRIL 20 MG/1
20 TABLET ORAL
COMMUNITY

## 2024-08-20 RX ORDER — DOXYCYCLINE 100 MG/1
CAPSULE ORAL
COMMUNITY
End: 2024-08-20

## 2024-08-20 RX ORDER — ELECTROLYTES/DEXTROSE
SOLUTION, ORAL ORAL
COMMUNITY
End: 2024-08-20

## 2024-08-20 RX ORDER — COVID-19 ANTIGEN TEST
KIT MISCELLANEOUS
COMMUNITY
End: 2024-08-20

## 2024-08-20 RX ORDER — LANCETS 30 GAUGE
EACH MISCELLANEOUS
COMMUNITY
Start: 2024-06-25 | End: 2024-08-20

## 2024-08-20 RX ORDER — SILVER SULFADIAZINE 10 MG/G
CREAM TOPICAL
COMMUNITY
Start: 2024-07-18 | End: 2024-08-20

## 2024-08-20 RX ORDER — DOXYCYCLINE 100 MG/1
100 CAPSULE ORAL 2 TIMES DAILY
COMMUNITY
Start: 2024-07-18 | End: 2024-08-20

## 2024-08-20 RX ORDER — DOXYCYCLINE 100 MG/1
100 CAPSULE ORAL 2 TIMES DAILY
COMMUNITY
Start: 2024-08-01 | End: 2024-08-20

## 2024-08-20 RX ORDER — EMPAGLIFLOZIN 10 MG/1
1 TABLET, FILM COATED ORAL DAILY
COMMUNITY

## 2024-08-20 RX ORDER — ASPIRIN 81 MG/1
81 TABLET ORAL
COMMUNITY
End: 2024-08-20

## 2024-08-20 RX ORDER — CYCLOBENZAPRINE HCL 5 MG
TABLET ORAL
COMMUNITY
End: 2024-08-20

## 2024-08-20 RX ORDER — SEMAGLUTIDE 1.34 MG/ML
INJECTION, SOLUTION SUBCUTANEOUS
COMMUNITY
End: 2024-08-20

## 2024-08-20 RX ORDER — SERTRALINE HYDROCHLORIDE 100 MG/1
1 TABLET, FILM COATED ORAL
COMMUNITY
End: 2024-08-20

## 2024-08-20 RX ORDER — UBIDECARENONE 30 MG
CAPSULE ORAL
COMMUNITY
End: 2024-08-20

## 2024-08-20 RX ORDER — ASPIRIN 81 MG/1
1 TABLET, CHEWABLE ORAL
COMMUNITY
End: 2024-08-20

## 2024-08-20 RX ORDER — LIRAGLUTIDE 6 MG/ML
INJECTION SUBCUTANEOUS
COMMUNITY
End: 2024-08-20

## 2024-08-20 RX ORDER — METOPROLOL SUCCINATE 50 MG/1
1 TABLET, EXTENDED RELEASE ORAL
COMMUNITY

## 2024-08-20 RX ORDER — BUPROPION HYDROCHLORIDE 150 MG/1
150 TABLET, EXTENDED RELEASE ORAL 2 TIMES DAILY
COMMUNITY
Start: 2024-07-13

## 2024-08-20 RX ORDER — TESTOSTERONE CYPIONATE 200 MG/ML
INJECTION, SOLUTION INTRAMUSCULAR
COMMUNITY
Start: 2024-07-11

## 2024-08-20 RX ORDER — OXYCODONE AND ACETAMINOPHEN 5; 325 MG/1; MG/1
TABLET ORAL
COMMUNITY
End: 2024-08-20

## 2024-08-20 ASSESSMENT — ENCOUNTER SYMPTOMS
MYALGIAS: 1
LEG PAIN: 1

## 2024-08-20 NOTE — PROGRESS NOTES
"Subjective:   Luis Alfredo Javed is a 44 y.o. male who presents for Leg Pain (R thigh pain after testosterone shot, sore/tender, x5 days)     Leg Pain  Associated symptoms include myalgias. Pertinent negatives include no rash.   44-year-old male presents with concern for right thigh pain.  5 days ago he gave himself the testosterone injection and at that time he noticed the blood.  This was the first time he had developed any bleeding with his self injections.  He has been on testosterone for approximately 6 months.  The muscle is sore, the skin is slightly tender, and he can feel the pressure as he walks.  Denies fever, redness, or swelling at the site.  He does feel he is getting better slowly.  He is a diabetic, most recent hemoglobin A1c was 6%.  Review of Systems   Musculoskeletal:  Positive for myalgias.   Skin:  Negative for itching and rash.      Objective:   BP 90/62 (BP Location: Left arm, Patient Position: Sitting, BP Cuff Size: Adult)   Pulse 94   Temp 36.8 °C (98.2 °F) (Temporal)   Resp 12   Ht 1.88 m (6' 2\")   Wt 123 kg (271 lb 12.8 oz)   SpO2 96%   BMI 34.90 kg/m²   Physical Exam  Constitutional:       Appearance: Normal appearance. He is not ill-appearing.   HENT:      Right Ear: External ear normal.      Left Ear: External ear normal.      Mouth/Throat:      Mouth: Mucous membranes are moist.   Eyes:      Extraocular Movements: Extraocular movements intact.      Conjunctiva/sclera: Conjunctivae normal.      Pupils: Pupils are equal, round, and reactive to light.   Cardiovascular:      Rate and Rhythm: Normal rate.   Pulmonary:      Effort: Pulmonary effort is normal.   Musculoskeletal:         General: Normal range of motion.      Cervical back: Normal range of motion and neck supple.        Legs:       Comments: Tender vastus intermedius with palpation, no swelling, erythema, ecchymosis.   Skin:     General: Skin is warm and dry.   Neurological:      General: No focal deficit present.      " Mental Status: He is alert.          Assessment/Plan:   1. Hematoma of right lower extremity, initial encounter    Other orders  - Empagliflozin (JARDIANCE) 10 MG Tab tablet; Jardiance  - buPROPion SR (WELLBUTRIN-SR) 150 MG TABLET SR 12 HR sustained-release tablet; Take 150 mg by mouth 2 times a day.  - atorvastatin (LIPITOR) 80 MG tablet; Take 1 Tablet by mouth every day.  - Empagliflozin (JARDIANCE) 10 MG Tab tablet; Take 1 Tablet by mouth every day.  - lisinopril (PRINIVIL) 20 MG Tab; Take 20 mg by mouth every day.  - metFORMIN (GLUCOPHAGE) 500 MG Tab; 2 tab(s) orally 2 times a day  - metformin (GLUCOPHAGE) 1000 MG tablet; Take 1,000 mg by mouth 2 times a day.  - metoprolol SR (TOPROL XL) 50 MG TABLET SR 24 HR; Take 1 Tablet by mouth every day.  - testosterone cypionate (DEPO-TESTOSTERONE) 200 MG/ML injection; INJECT 1ML EVERY 2 WEEKS    44-year-old male presents with concern for right thigh pain.  Vital signs stable, he is slightly hypotensive blood pressure recheck demonstrates 98/80.  Afebrile.  He is in no acute distress and does not appear ill.  Focused exam demonstrates the right thigh is tender along the vastus intermedius with palpation.  There is no swelling, erythema, ecchymosis noted.  History and exam are consistent with suspected small hematoma secondary to hitting a capillary during injection.  I have offered to order an ultrasound of the thigh, he has declined.  He is improving slowly.  Completed more in-depth injection training today as well as education on concerning signs and symptoms.  He is encouraged to return to clinic if this continues to be painful or if it has a sudden change and gets worse.  We have also discussed his blood pressure medication, currently takes it every other day as ordered by his primary care.  With his blood pressure being slightly low today he is concerned for the medication being too strong.  Instead of discontinuing his medication because I do believe he should be on  something as a diabetic who previously weighed 400 pounds, I have recommended he have some parameters.  Encouraged him to make an appointment with his primary care to discuss medication options.  Have also instructed him to begin journaling his blood pressure readings prior to taking his medication.  I told him to hold off on taking the medication if his systolic is less than 110.  I personally reviewed prior external notes and prior test results pertinent to today's visit.   Discussed management options, risks and benefits, and alternatives to treatment plan agreed upon.   Red flags discussed and indications to immediately call 911 or present to the Emergency Department.   Supportive care, differential diagnoses, and indications for immediate follow-up discussed with patient.    Patient expresses understanding and agrees to plan. Patient denies any other questions or concerns.

## 2024-08-22 ENCOUNTER — OFFICE VISIT (OUTPATIENT)
Dept: URGENT CARE | Facility: PHYSICIAN GROUP | Age: 44
End: 2024-08-22
Payer: COMMERCIAL

## 2024-08-22 ENCOUNTER — HOSPITAL ENCOUNTER (OUTPATIENT)
Dept: RADIOLOGY | Facility: MEDICAL CENTER | Age: 44
End: 2024-08-22
Attending: REGISTERED NURSE
Payer: COMMERCIAL

## 2024-08-22 VITALS
HEART RATE: 82 BPM | BODY MASS INDEX: 34.91 KG/M2 | WEIGHT: 272 LBS | RESPIRATION RATE: 16 BRPM | OXYGEN SATURATION: 99 % | DIASTOLIC BLOOD PRESSURE: 70 MMHG | HEIGHT: 74 IN | TEMPERATURE: 98.2 F | SYSTOLIC BLOOD PRESSURE: 124 MMHG

## 2024-08-22 DIAGNOSIS — M79.89 LEG SWELLING: ICD-10-CM

## 2024-08-22 DIAGNOSIS — L03.818 CELLULITIS OF OTHER SPECIFIED SITE: ICD-10-CM

## 2024-08-22 PROCEDURE — 3074F SYST BP LT 130 MM HG: CPT | Performed by: REGISTERED NURSE

## 2024-08-22 PROCEDURE — 99214 OFFICE O/P EST MOD 30 MIN: CPT | Performed by: REGISTERED NURSE

## 2024-08-22 PROCEDURE — 3078F DIAST BP <80 MM HG: CPT | Performed by: REGISTERED NURSE

## 2024-08-22 PROCEDURE — 93971 EXTREMITY STUDY: CPT | Mod: LT

## 2024-08-22 ASSESSMENT — ENCOUNTER SYMPTOMS
ABDOMINAL PAIN: 0
SHORTNESS OF BREATH: 0
DIZZINESS: 0
FEVER: 0
CHILLS: 0
MYALGIAS: 0

## 2024-08-22 NOTE — PROGRESS NOTES
Subjective:   Luis Alfredo Javed is a 44 y.o. male who presents for Leg Swelling (Pt was seen for leg swelling on 8/20/2024, is now happening on left )      HPI  2 days developed swelling of the left lower extremity, and pain in the left calf.  Worse with walking.  Also has infection in second toe of left foot which is scheduled to be worked in today by his podiatrist.  He is not having fevers or chills at this time.  No body aches.  There was no trauma or injury to the lower extremity.  No history of blood clots.  No recent long travel.  Multiple comorbidities    Review of Systems   Constitutional:  Negative for chills and fever.   Respiratory:  Negative for shortness of breath.    Cardiovascular:  Negative for chest pain.   Gastrointestinal:  Negative for abdominal pain.   Musculoskeletal:  Negative for myalgias.   Skin:  Negative for rash.   Neurological:  Negative for dizziness.       No Known Allergies    Patient Active Problem List    Diagnosis Date Noted    Complete rotator cuff tear or rupture of right shoulder, not specified as traumatic 02/15/2024    Subacromial impingement of right shoulder 02/15/2024    Labral tear of shoulder, right, initial encounter 02/15/2024    Fatigue 06/28/2022    Generalized body aches 10/09/2018    Normocytic anemia 09/13/2018    Cellulitis of left axilla 07/06/2018    Rotator cuff tendonitis, right 07/06/2018    Acute pain of both shoulders 09/07/2017    Atypical chest pain 06/30/2017    Alcohol abuse, episodic 01/19/2017    Acute right flank pain 01/19/2017    Left sided sciatica 01/03/2017    Cellulitis of extremity 01/03/2017    Otalgia of both ears 08/30/2016    Sleep apnea in adult 06/06/2016    Anxiety 01/26/2016    Morbid obesity (HCC) 09/22/2015    Tobacco abuse counseling 08/27/2015    GERD (gastroesophageal reflux disease) 04/06/2015    Hypertriglyceridemia 01/05/2015    Type 2 diabetes mellitus with obesity (HCC) 12/22/2014    HTN (hypertension) 12/10/2014    Chest  pain 12/10/2014       Current Outpatient Medications Ordered in Epic   Medication Sig Dispense Refill    Empagliflozin (JARDIANCE) 10 MG Tab tablet Jardiance      buPROPion SR (WELLBUTRIN-SR) 150 MG TABLET SR 12 HR sustained-release tablet Take 150 mg by mouth 2 times a day.      atorvastatin (LIPITOR) 80 MG tablet Take 1 Tablet by mouth every day.      Empagliflozin (JARDIANCE) 10 MG Tab tablet Take 1 Tablet by mouth every day.      lisinopril (PRINIVIL) 20 MG Tab Take 20 mg by mouth every day.      metFORMIN (GLUCOPHAGE) 500 MG Tab 2 tab(s) orally 2 times a day      metformin (GLUCOPHAGE) 1000 MG tablet Take 1,000 mg by mouth 2 times a day.      metoprolol SR (TOPROL XL) 50 MG TABLET SR 24 HR Take 1 Tablet by mouth every day.      testosterone cypionate (DEPO-TESTOSTERONE) 200 MG/ML injection INJECT 1ML EVERY 2 WEEKS      lisinopril (PRINIVIL) 10 MG Tab Take 1 Tablet by mouth every day.      testosterone cypionate (DEPO-TESTOSTERONE) 100 MG/ML injection Inject 50 mg into the shoulder, thigh, or buttocks one time.      metFORMIN (GLUCOPHAGE) 500 MG Tab TAKE TWO TABLETS BY MOUTH TWICE A  Tablet 4    JARDIANCE 10 MG Tab TAKE ONE TABLET BY MOUTH DAILY 30 Tablet 5    metoprolol SR (TOPROL XL) 50 MG TABLET SR 24 HR TAKE ONE TABLET BY MOUTH DAILY 90 Tablet 3    Semaglutide,0.25 or 0.5MG/DOS, (OZEMPIC, 0.25 OR 0.5 MG/DOSE,) 2 MG/1.5ML Solution Pen-injector Inject 0.25 mg under the skin every 7 days. 1.5 mL 5    atorvastatin (LIPITOR) 40 MG Tab TAKE ONE TABLET BY MOUTH DAILY (Patient not taking: Reported on 8/22/2024) 30 Tablet 4     No current Epic-ordered facility-administered medications on file.       Past Surgical History:   Procedure Laterality Date    PB SHLDR ARTHROSCOP,SURG,W/ROTAT CUFF REPB Right 3/22/2024    Procedure: RIGHT SHOULDER ARTHROSCOPY ROTATOR CUFF REPAIR, RIGHT SUBACROMIAL DECOMPRESSION, RIGHT LABRAL DEBRIDEMENT, REPAIRS AS INDICATED;  Surgeon: Kenneth Naylor M.D.;  Location: [facility] [place], [place]  "Sur[facility] Center;  Service: Orthopedics    HERNIA REPAIR  age 4    STRABISMUS REPAIR  age 5       Social History     Tobacco Use    Smoking status: Every Day     Current packs/day: 1.50     Average packs/day: 1.5 packs/day for 21.0 years (31.5 ttl pk-yrs)     Types: Cigarettes    Smokeless tobacco: Never    Tobacco comments:     Last cigarette 3/22   Vaping Use    Vaping status: Never Used   Substance Use Topics    Alcohol use: No     Alcohol/week: 0.0 oz    Drug use: Yes     Types: Inhaled     Comment: Marijuana--last used 3/20       family history includes Alcohol/Drug in his maternal grandfather; Cancer in his paternal grandfather; Diabetes in his mother and paternal grandfather; Stroke in his father.     Problem list, medications, and allergies reviewed by myself today in Epic.     Objective:   /70 (BP Location: Left arm, Patient Position: Sitting, BP Cuff Size: Adult)   Pulse 82   Temp 36.8 °C (98.2 °F) (Temporal)   Resp 16   Ht 1.88 m (6' 2\")   Wt 123 kg (272 lb)   SpO2 99%   BMI 34.92 kg/m²     Physical Exam  Vitals and nursing note reviewed.   Constitutional:       Appearance: Normal appearance. He is not ill-appearing or toxic-appearing.   HENT:      Mouth/Throat:      Mouth: Mucous membranes are moist.   Eyes:      Pupils: Pupils are equal, round, and reactive to light.   Cardiovascular:      Rate and Rhythm: Normal rate and regular rhythm.   Pulmonary:      Effort: Pulmonary effort is normal. No respiratory distress.      Breath sounds: Normal breath sounds.   Musculoskeletal:         General: Normal range of motion.      Cervical back: Normal range of motion.      Comments: Erythema and tenderness of the second digit left foot.  No streaking up the leg.  Swelling extending from above knee to foot roughly 1+, tenderness to the medial deep calf.  NVID.  Antalgic gait   Skin:     General: Skin is warm and dry.      Capillary Refill: Capillary refill takes less than 2 seconds.      Findings: No " rash.   Neurological:      General: No focal deficit present.      Mental Status: He is alert and oriented to person, place, and time.   Psychiatric:         Mood and Affect: Mood normal.         Assessment/Plan:     I personally reviewed prior external notes and test results pertinent to today's visit as well as additional imaging and testing completed in clinic today.    I introduced myself as Alfonso Lui a Nurse Practitioner.    1. Leg swelling  US-EXTREMITY VENOUS LOWER UNILAT LEFT      2. Cellulitis of other specified site        Patient presenting with acute left lower extremity swelling and pain in the calf.  No history of blood clots.  Does have multiple comorbidities and polypharmacy.  He also has coexisting infection in the second digit left foot that is being reevaluated by managing physician today.  Finished antibiotics for this a week and a half ago.  No recent long travel.  His vitals are reassuring.  No chest pain or shortness of breath.  On exam there is erythema and tenderness to the second digit of left foot but no streaking up the leg then he also has left lower extremity swelling from the knee down to the foot as well as tenderness in the left medial calf.  I think it is likely this is related to the infection in the toe however cannot exclude DVT so I ordered an ultrasound which ruled out DVT.  The ultrasound did show an inguinal lymph node that was enlarged which could be secondary to infection in the toe.  I did not prescribe antibiotics because he is following up with his podiatrist who is actively treating the toe.  If needed will add antibiotics to treat infection until however patient states his podiatrist will likely do this    . The Patient was encouraged to monitor symptoms closely, and we reviewed the signs and symptoms that require a higher level of care through the emergency department. Patient verbalized understanding.    Please note that this dictation was created using voice  recognition software. I have made every reasonable attempt to correct obvious errors, but I expect that there are errors of grammar and possibly content that I did not discover before finalizing the note.    This note was electronically signed by ESTRADA Glass